# Patient Record
Sex: FEMALE | Race: WHITE | NOT HISPANIC OR LATINO | Employment: FULL TIME | URBAN - METROPOLITAN AREA
[De-identification: names, ages, dates, MRNs, and addresses within clinical notes are randomized per-mention and may not be internally consistent; named-entity substitution may affect disease eponyms.]

---

## 2018-01-28 ENCOUNTER — APPOINTMENT (OUTPATIENT)
Dept: CT IMAGING | Facility: HOSPITAL | Age: 58
End: 2018-01-28

## 2018-01-28 ENCOUNTER — HOSPITAL ENCOUNTER (EMERGENCY)
Facility: HOSPITAL | Age: 58
Discharge: HOME | End: 2018-01-28
Attending: EMERGENCY MEDICINE | Admitting: EMERGENCY MEDICINE

## 2018-01-28 VITALS
DIASTOLIC BLOOD PRESSURE: 73 MMHG | TEMPERATURE: 102.5 F | HEIGHT: 69 IN | WEIGHT: 260 LBS | HEART RATE: 116 BPM | SYSTOLIC BLOOD PRESSURE: 146 MMHG | BODY MASS INDEX: 38.51 KG/M2 | RESPIRATION RATE: 16 BRPM | OXYGEN SATURATION: 95 %

## 2018-01-28 DIAGNOSIS — R11.10 VOMITING AND DIARRHEA: Primary | ICD-10-CM

## 2018-01-28 DIAGNOSIS — R19.7 VOMITING AND DIARRHEA: Primary | ICD-10-CM

## 2018-01-28 LAB
ALBUMIN SERPL-MCNC: 4.1 G/DL (ref 3.5–5.2)
ALBUMIN/GLOB SERPL: 1.1 G/DL
ALP SERPL-CCNC: 83 U/L (ref 39–117)
ALT SERPL W P-5'-P-CCNC: 17 U/L (ref 1–33)
ANION GAP SERPL CALCULATED.3IONS-SCNC: 20.1 MMOL/L
AST SERPL-CCNC: 15 U/L (ref 1–32)
BASOPHILS # BLD AUTO: 0.01 10*3/MM3 (ref 0–0.2)
BASOPHILS NFR BLD AUTO: 0.1 % (ref 0–1.5)
BILIRUB SERPL-MCNC: 0.5 MG/DL (ref 0.1–1.2)
BUN BLD-MCNC: 10 MG/DL (ref 6–20)
BUN/CREAT SERPL: 10.8 (ref 7–25)
CALCIUM SPEC-SCNC: 9.5 MG/DL (ref 8.6–10.5)
CHLORIDE SERPL-SCNC: 87 MMOL/L (ref 98–107)
CO2 SERPL-SCNC: 25.9 MMOL/L (ref 22–29)
CREAT BLD-MCNC: 0.93 MG/DL (ref 0.57–1)
DEPRECATED RDW RBC AUTO: 42.2 FL (ref 37–54)
EOSINOPHIL # BLD AUTO: 0.03 10*3/MM3 (ref 0–0.7)
EOSINOPHIL NFR BLD AUTO: 0.2 % (ref 0.3–6.2)
ERYTHROCYTE [DISTWIDTH] IN BLOOD BY AUTOMATED COUNT: 12.8 % (ref 11.7–13)
GFR SERPL CREATININE-BSD FRML MDRD: 62 ML/MIN/1.73
GLOBULIN UR ELPH-MCNC: 3.7 GM/DL
GLUCOSE BLD-MCNC: 225 MG/DL (ref 65–99)
HCT VFR BLD AUTO: 40.9 % (ref 35.6–45.5)
HGB BLD-MCNC: 13.3 G/DL (ref 11.9–15.5)
IMM GRANULOCYTES # BLD: 0.05 10*3/MM3 (ref 0–0.03)
IMM GRANULOCYTES NFR BLD: 0.4 % (ref 0–0.5)
LIPASE SERPL-CCNC: 20 U/L (ref 13–60)
LYMPHOCYTES # BLD AUTO: 1.02 10*3/MM3 (ref 0.9–4.8)
LYMPHOCYTES NFR BLD AUTO: 7.3 % (ref 19.6–45.3)
MCH RBC QN AUTO: 29.4 PG (ref 26.9–32)
MCHC RBC AUTO-ENTMCNC: 32.5 G/DL (ref 32.4–36.3)
MCV RBC AUTO: 90.3 FL (ref 80.5–98.2)
MONOCYTES # BLD AUTO: 0.64 10*3/MM3 (ref 0.2–1.2)
MONOCYTES NFR BLD AUTO: 4.6 % (ref 5–12)
NEUTROPHILS # BLD AUTO: 12.19 10*3/MM3 (ref 1.9–8.1)
NEUTROPHILS NFR BLD AUTO: 87.4 % (ref 42.7–76)
PLATELET # BLD AUTO: 269 10*3/MM3 (ref 140–500)
PMV BLD AUTO: 10.3 FL (ref 6–12)
POTASSIUM BLD-SCNC: 4.1 MMOL/L (ref 3.5–5.2)
PROT SERPL-MCNC: 7.8 G/DL (ref 6–8.5)
RBC # BLD AUTO: 4.53 10*6/MM3 (ref 3.9–5.2)
SODIUM BLD-SCNC: 133 MMOL/L (ref 136–145)
WBC NRBC COR # BLD: 13.94 10*3/MM3 (ref 4.5–10.7)

## 2018-01-28 PROCEDURE — 25010000002 ONDANSETRON PER 1 MG: Performed by: EMERGENCY MEDICINE

## 2018-01-28 PROCEDURE — 80053 COMPREHEN METABOLIC PANEL: CPT | Performed by: EMERGENCY MEDICINE

## 2018-01-28 PROCEDURE — 99284 EMERGENCY DEPT VISIT MOD MDM: CPT

## 2018-01-28 PROCEDURE — 96361 HYDRATE IV INFUSION ADD-ON: CPT

## 2018-01-28 PROCEDURE — 0 IOPAMIDOL 61 % SOLUTION: Performed by: EMERGENCY MEDICINE

## 2018-01-28 PROCEDURE — 96374 THER/PROPH/DIAG INJ IV PUSH: CPT

## 2018-01-28 PROCEDURE — 85025 COMPLETE CBC W/AUTO DIFF WBC: CPT | Performed by: EMERGENCY MEDICINE

## 2018-01-28 PROCEDURE — 83690 ASSAY OF LIPASE: CPT | Performed by: EMERGENCY MEDICINE

## 2018-01-28 PROCEDURE — 74177 CT ABD & PELVIS W/CONTRAST: CPT

## 2018-01-28 RX ORDER — ONDANSETRON 2 MG/ML
4 INJECTION INTRAMUSCULAR; INTRAVENOUS ONCE
Status: COMPLETED | OUTPATIENT
Start: 2018-01-28 | End: 2018-01-28

## 2018-01-28 RX ORDER — ONDANSETRON 4 MG/1
4 TABLET, ORALLY DISINTEGRATING ORAL 4 TIMES DAILY PRN
Qty: 15 TABLET | Refills: 0 | Status: SHIPPED | OUTPATIENT
Start: 2018-01-28

## 2018-01-28 RX ORDER — METRONIDAZOLE 500 MG/1
500 TABLET ORAL 3 TIMES DAILY
Qty: 21 TABLET | Refills: 0 | Status: SHIPPED | OUTPATIENT
Start: 2018-01-28 | End: 2019-07-29 | Stop reason: ALTCHOICE

## 2018-01-28 RX ORDER — ACETAMINOPHEN 500 MG
1000 TABLET ORAL ONCE
Status: COMPLETED | OUTPATIENT
Start: 2018-01-28 | End: 2018-01-28

## 2018-01-28 RX ADMIN — ONDANSETRON 4 MG: 2 INJECTION INTRAMUSCULAR; INTRAVENOUS at 21:23

## 2018-01-28 RX ADMIN — SODIUM CHLORIDE 500 ML: 9 INJECTION, SOLUTION INTRAVENOUS at 22:59

## 2018-01-28 RX ADMIN — SODIUM CHLORIDE 1000 ML: 9 INJECTION, SOLUTION INTRAVENOUS at 21:22

## 2018-01-28 RX ADMIN — IOPAMIDOL 80 ML: 612 INJECTION, SOLUTION INTRAVENOUS at 22:32

## 2018-01-28 RX ADMIN — ACETAMINOPHEN 1000 MG: 500 TABLET ORAL at 22:39

## 2019-07-29 ENCOUNTER — OFFICE VISIT (OUTPATIENT)
Dept: ENDOCRINOLOGY | Age: 59
End: 2019-07-29

## 2019-07-29 ENCOUNTER — PRIOR AUTHORIZATION (OUTPATIENT)
Dept: ENDOCRINOLOGY | Age: 59
End: 2019-07-29

## 2019-07-29 VITALS
SYSTOLIC BLOOD PRESSURE: 142 MMHG | WEIGHT: 287.8 LBS | DIASTOLIC BLOOD PRESSURE: 80 MMHG | RESPIRATION RATE: 16 BRPM | BODY MASS INDEX: 43.62 KG/M2 | HEIGHT: 68 IN

## 2019-07-29 DIAGNOSIS — E66.01 CLASS 3 SEVERE OBESITY WITHOUT SERIOUS COMORBIDITY WITH BODY MASS INDEX (BMI) OF 40.0 TO 44.9 IN ADULT, UNSPECIFIED OBESITY TYPE (HCC): ICD-10-CM

## 2019-07-29 DIAGNOSIS — E78.5 ATHEROGENIC DYSLIPIDEMIA: ICD-10-CM

## 2019-07-29 DIAGNOSIS — E11.9 TYPE 2 DIABETES MELLITUS WITHOUT COMPLICATION, WITHOUT LONG-TERM CURRENT USE OF INSULIN (HCC): Primary | ICD-10-CM

## 2019-07-29 DIAGNOSIS — E78.00 HIGH CHOLESTEROL: ICD-10-CM

## 2019-07-29 DIAGNOSIS — I10 ESSENTIAL HYPERTENSION: ICD-10-CM

## 2019-07-29 DIAGNOSIS — E55.9 VITAMIN D DEFICIENCY: ICD-10-CM

## 2019-07-29 PROBLEM — E66.813 CLASS 3 SEVERE OBESITY WITHOUT SERIOUS COMORBIDITY WITH BODY MASS INDEX (BMI) OF 40.0 TO 44.9 IN ADULT: Status: ACTIVE | Noted: 2019-07-29

## 2019-07-29 PROCEDURE — 99205 OFFICE O/P NEW HI 60 MIN: CPT | Performed by: INTERNAL MEDICINE

## 2019-07-29 RX ORDER — ROSUVASTATIN CALCIUM 40 MG/1
40 TABLET, COATED ORAL DAILY
Qty: 90 TABLET | Refills: 3 | Status: SHIPPED | OUTPATIENT
Start: 2019-07-29 | End: 2020-07-20

## 2019-07-29 RX ORDER — LISINOPRIL 40 MG/1
40 TABLET ORAL DAILY
COMMUNITY

## 2019-07-29 RX ORDER — GLIMEPIRIDE 4 MG/1
4 TABLET ORAL
COMMUNITY
End: 2019-07-29

## 2019-07-29 RX ORDER — EMPAGLIFLOZIN, METFORMIN HYDROCHLORIDE 12.5; 1 MG/1; MG/1
2 TABLET, EXTENDED RELEASE ORAL
Qty: 60 TABLET | Refills: 11 | Status: SHIPPED | OUTPATIENT
Start: 2019-07-29 | End: 2020-08-04

## 2019-07-29 RX ORDER — INSULIN GLARGINE 300 U/ML
90 INJECTION, SOLUTION SUBCUTANEOUS
Qty: 3 PEN | Refills: 11 | Status: SHIPPED | OUTPATIENT
Start: 2019-07-29 | End: 2019-10-30

## 2019-07-29 RX ORDER — EXENATIDE 2 MG/.85ML
2 INJECTION, SUSPENSION, EXTENDED RELEASE SUBCUTANEOUS WEEKLY
Qty: 4 PEN | Refills: 11 | Status: SHIPPED | OUTPATIENT
Start: 2019-07-29 | End: 2019-12-06

## 2019-07-29 NOTE — PROGRESS NOTES
"Subjective   Ashley Malone is a 59 y.o. female seen as a new patient for DM2. She is not checking BG. She states that she is having tingling in her feet. She denies any other problems or concerns.     History of Present Illness this is a 59-year-old female known patient with type 2 diabetes for more than a decade and her diabetes has been managed by glimepiride 4 mg twice daily and recently has been a started on Levemir 20 units nightly.  She is also unknown patient with hypertension and dyslipidemia as well as morbid obesity and peripheral and cerebrovascular disease and chronic fatigue.  She has a strong family history of type 2 diabetes and heart disease.  She has not been able to lose weight and in fact has been gaining weight for the past several years.    /80   Resp 16   Ht 172.7 cm (68\")   Wt 131 kg (287 lb 12.8 oz)   BMI 43.76 kg/m²      Allergies   Allergen Reactions   • Augmentin [Amoxicillin-Pot Clavulanate] GI Intolerance   • Buffered Aspirin Unknown (See Comments)     Nose bleeds       Current Outpatient Medications:   •  AMLODIPINE BESYLATE PO, Take 5 mg by mouth daily with breakfast., Disp: , Rfl:   •  Armodafinil (NUVIGIL PO), Take 250 mg by mouth every morning., Disp: , Rfl:   •  Celecoxib (CELEBREX PO), Take 200 mg by mouth every morning., Disp: , Rfl:   •  clopidogrel (PLAVIX) 75 MG tablet, Take 75 mg by mouth daily., Disp: , Rfl:   •  GABAPENTIN PO, Take 200 mg by mouth 3 (three) times a day., Disp: , Rfl:   •  glimepiride (AMARYL) 4 MG tablet, Take 4 mg by mouth Every Morning Before Breakfast., Disp: , Rfl:   •  HYDROcodone-acetaminophen (NORCO)  MG per tablet, Take 1 tablet by mouth every 4 (four) hours as needed for moderate pain (4-6)., Disp: 1 tablet, Rfl: 0  •  Hyoscyamine Sulfate (LEVBID PO), Take 1.25 mg by mouth daily as needed., Disp: , Rfl:   •  insulin detemir (LEVEMIR FLEXTOUCH) 100 UNIT/ML injection, Inject 20 Units under the skin into the appropriate area as " directed Every Night., Disp: , Rfl:   •  lisinopril (PRINIVIL,ZESTRIL) 40 MG tablet, Take 40 mg by mouth Daily., Disp: , Rfl:   •  METOPROLOL SUCCINATE ER PO, Take 40 mg by mouth every morning., Disp: , Rfl:   •  MONTELUKAST SODIUM PO, Take 10 mg by mouth every morning., Disp: , Rfl:   •  ondansetron ODT (ZOFRAN-ODT) 4 MG disintegrating tablet, Take 1 tablet by mouth 4 (Four) Times a Day As Needed for Nausea or Vomiting., Disp: 15 tablet, Rfl: 0  •  rOPINIRole (REQUIP) 4 MG tablet, Take 4 mg by mouth at night as needed., Disp: , Rfl:   •  TRAZODONE HCL PO, Take 300 mg by mouth every night., Disp: , Rfl:       The following portions of the patient's history were reviewed and updated as appropriate: allergies, current medications, past family history, past medical history, past social history, past surgical history and problem list.    Review of Systems   Constitutional: Negative.    HENT: Negative.    Eyes: Negative.    Respiratory: Negative.    Cardiovascular: Negative.    Gastrointestinal: Negative.    Endocrine: Negative.    Genitourinary: Negative.    Musculoskeletal: Negative.    Skin: Negative.    Allergic/Immunologic: Negative.    Neurological: Negative.    Hematological: Negative.    Psychiatric/Behavioral: Negative.        Objective   Physical Exam   Constitutional: She is oriented to person, place, and time. She appears well-developed and well-nourished. No distress.   HENT:   Head: Normocephalic and atraumatic.   Right Ear: External ear normal.   Left Ear: External ear normal.   Nose: Nose normal.   Mouth/Throat: Oropharynx is clear and moist. No oropharyngeal exudate.   Eyes: Conjunctivae and EOM are normal. Pupils are equal, round, and reactive to light. Right eye exhibits no discharge. Left eye exhibits no discharge. No scleral icterus.   Neck: Normal range of motion. Neck supple. No JVD present. No tracheal deviation present. No thyromegaly present.   Cardiovascular: Normal rate, regular rhythm, normal  heart sounds and intact distal pulses. Exam reveals no gallop and no friction rub.   No murmur heard.  Pulmonary/Chest: Effort normal and breath sounds normal. No stridor. No respiratory distress. She has no wheezes. She has no rales. She exhibits no tenderness.   Abdominal: Soft. Bowel sounds are normal. She exhibits no distension and no mass. There is no tenderness. There is no rebound and no guarding. No hernia.   Musculoskeletal: Normal range of motion. She exhibits no edema, tenderness or deformity.   Healed scar of knee surgery on the anterior aspect of right knee.   Lymphadenopathy:     She has no cervical adenopathy.   Neurological: She is alert and oriented to person, place, and time. She has normal reflexes. She displays normal reflexes. No cranial nerve deficit or sensory deficit. She exhibits normal muscle tone. Coordination normal.   Skin: Skin is warm and dry. No rash noted. She is not diaphoretic. No erythema. No pallor.   Psychiatric: She has a normal mood and affect. Her behavior is normal. Judgment and thought content normal.   Nursing note and vitals reviewed.        Assessment/Plan   Diagnoses and all orders for this visit:    Type 2 diabetes mellitus without complication, without long-term current use of insulin (CMS/[facility])  -     T4 & TSH ([facility])  -     T3, Free  -     T4, Free  -     Uric Acid  -     Vitamin D 25 Hydroxy  -     Comprehensive Metabolic Panel  -     C-Peptide  -     Follicle Stimulating Hormone  -     Hemoglobin A1c  -     MicroAlbumin, Urine, Random - Urine, Clean Catch  -     ACTH  -     Cortisol  -     Calcium, Ionized  -     PTH, Intact  -     Phosphorus  -     NMR LipoProfile  -     T4 & TSH ([facility]); Future  -     Vitamin D 25 Hydroxy; Future  -     Uric Acid; Future  -     Comprehensive Metabolic Panel; Future  -     C-Peptide; Future  -     Hemoglobin A1c; Future  -     MicroAlbumin, Urine, Random - Urine, Clean Catch; Future  -     NMR LipoProfile; Future    Essential  hypertension  -     T4 & TSH ([facility])  -     T3, Free  -     T4, Free  -     Uric Acid  -     Vitamin D 25 Hydroxy  -     Comprehensive Metabolic Panel  -     C-Peptide  -     Follicle Stimulating Hormone  -     Hemoglobin A1c  -     MicroAlbumin, Urine, Random - Urine, Clean Catch  -     ACTH  -     Cortisol  -     Calcium, Ionized  -     PTH, Intact  -     Phosphorus  -     NMR LipoProfile  -     T4 & TSH ([facility]); Future  -     Vitamin D 25 Hydroxy; Future  -     Uric Acid; Future  -     Comprehensive Metabolic Panel; Future  -     C-Peptide; Future  -     Hemoglobin A1c; Future  -     MicroAlbumin, Urine, Random - Urine, Clean Catch; Future  -     NMR LipoProfile; Future    High cholesterol  -     T4 & TSH ([facility])  -     T3, Free  -     T4, Free  -     Uric Acid  -     Vitamin D 25 Hydroxy  -     Comprehensive Metabolic Panel  -     C-Peptide  -     Follicle Stimulating Hormone  -     Hemoglobin A1c  -     MicroAlbumin, Urine, Random - Urine, Clean Catch  -     ACTH  -     Cortisol  -     Calcium, Ionized  -     PTH, Intact  -     Phosphorus  -     NMR LipoProfile  -     T4 & TSH ([facility]); Future  -     Vitamin D 25 Hydroxy; Future  -     Uric Acid; Future  -     Comprehensive Metabolic Panel; Future  -     C-Peptide; Future  -     Hemoglobin A1c; Future  -     MicroAlbumin, Urine, Random - Urine, Clean Catch; Future  -     NMR LipoProfile; Future    Class 3 severe obesity without serious comorbidity with body mass index (BMI) of 40.0 to 44.9 in adult, unspecified obesity type (CMS/HCC)  -     T4 & TSH ([facility])  -     T3, Free  -     T4, Free  -     Uric Acid  -     Vitamin D 25 Hydroxy  -     Comprehensive Metabolic Panel  -     C-Peptide  -     Follicle Stimulating Hormone  -     Hemoglobin A1c  -     MicroAlbumin, Urine, Random - Urine, Clean Catch  -     ACTH  -     Cortisol  -     Calcium, Ionized  -     PTH, Intact  -     Phosphorus  -     NMR LipoProfile  -     T4 & TSH ([facility]); Future  -      Vitamin D 25 Hydroxy; Future  -     Uric Acid; Future  -     Comprehensive Metabolic Panel; Future  -     C-Peptide; Future  -     Hemoglobin A1c; Future  -     MicroAlbumin, Urine, Random - Urine, Clean Catch; Future  -     NMR LipoProfile; Future    Vitamin D deficiency  -     T4 & TSH ([facility])  -     T3, Free  -     T4, Free  -     Uric Acid  -     Vitamin D 25 Hydroxy  -     Comprehensive Metabolic Panel  -     C-Peptide  -     Follicle Stimulating Hormone  -     Hemoglobin A1c  -     MicroAlbumin, Urine, Random - Urine, Clean Catch  -     ACTH  -     Cortisol  -     Calcium, Ionized  -     PTH, Intact  -     Phosphorus  -     NMR LipoProfile  -     T4 & TSH ([facility]); Future  -     Vitamin D 25 Hydroxy; Future  -     Uric Acid; Future  -     Comprehensive Metabolic Panel; Future  -     C-Peptide; Future  -     Hemoglobin A1c; Future  -     MicroAlbumin, Urine, Random - Urine, Clean Catch; Future  -     NMR LipoProfile; Future    Atherogenic dyslipidemia  -     T4 & TSH ([facility])  -     T3, Free  -     T4, Free  -     Uric Acid  -     Vitamin D 25 Hydroxy  -     Comprehensive Metabolic Panel  -     C-Peptide  -     Follicle Stimulating Hormone  -     Hemoglobin A1c  -     MicroAlbumin, Urine, Random - Urine, Clean Catch  -     ACTH  -     Cortisol  -     Calcium, Ionized  -     PTH, Intact  -     Phosphorus  -     NMR LipoProfile  -     T4 & TSH ([facility]); Future  -     Vitamin D 25 Hydroxy; Future  -     Uric Acid; Future  -     Comprehensive Metabolic Panel; Future  -     C-Peptide; Future  -     Hemoglobin A1c; Future  -     MicroAlbumin, Urine, Random - Urine, Clean Catch; Future  -     NMR LipoProfile; Future    Other orders  -     BYDUREON BCISE 2 MG/0.85ML auto-injector injection; Inject 0.85 mL under the skin into the appropriate area as directed 1 (One) Time Per Week.  -     SYNJARDY XR 12.5-1000 MG tablet sustained-release 24 hour; Take 2 tablets by mouth Daily With Breakfast.  -     rosuvastatin  (CRESTOR) 40 MG tablet; Take 1 tablet by mouth Daily.  -     TOUJEO MAX SOLOSTAR 300 UNIT/ML solution pen-injector; Inject 90 Units under the skin into the appropriate area as directed Daily With Breakfast.        In summary I saw and examined this 59-year-old female for above-mentioned problems.  I reviewed her laboratory evaluations of 10/17/2018 as well as 2/6/2019 and 5/29/2019 and at this point we will go ahead and order extensive laboratory evaluation and once the results come back we will go ahead and call for any possible modification or new medications.  In the meantime I am stopping both Amaryl and Levemir and is starting her on Bydureon on 2 mg once weekly and Synjardy 5/1000 mg 2 tablets every morning and if tolerated we will increase the dose to Synjardy XR 12.5/1000 mg 2 tablets every morning.  I asked her to be sure to drink plenty of fluids and cleans her genital area thoroughly following use of toilet or sexual intercourse.  And finally I start her on Toujeo 30 units every morning and ask her to increase her dose by 2 units every 3 days if fasting blood glucose is greater than 110.  We also ordered a freestyle natalie for her continuous blood glucose monitoring.  This office visit lasted 70 minutes of which 40 minutes was a spent on face-to-face counseling and education and planning the patient's future care moving forward.  She will see Ms. Krista Montana in 4 months or sooner if needed with laboratory evaluation prior to each office visit.

## 2019-07-30 LAB
25(OH)D3+25(OH)D2 SERPL-MCNC: 24 NG/ML (ref 30–100)
ACTH PLAS-MCNC: 26.9 PG/ML (ref 7.2–63.3)
ALBUMIN SERPL-MCNC: 4.3 G/DL (ref 3.5–5.2)
ALBUMIN/GLOB SERPL: 1.3 G/DL
ALP SERPL-CCNC: 128 U/L (ref 39–117)
ALT SERPL-CCNC: 16 U/L (ref 1–33)
AST SERPL-CCNC: 16 U/L (ref 1–32)
BILIRUB SERPL-MCNC: 0.2 MG/DL (ref 0.2–1.2)
BUN SERPL-MCNC: 10 MG/DL (ref 6–20)
BUN/CREAT SERPL: 12.2 (ref 7–25)
C PEPTIDE SERPL-MCNC: 4.8 NG/ML (ref 1.1–4.4)
CA-I SERPL ISE-MCNC: 5.3 MG/DL (ref 4.5–5.6)
CALCIUM SERPL-MCNC: 10 MG/DL (ref 8.6–10.5)
CHLORIDE SERPL-SCNC: 92 MMOL/L (ref 98–107)
CHOLEST SERPL-MCNC: 212 MG/DL (ref 100–199)
CO2 SERPL-SCNC: 32.6 MMOL/L (ref 22–29)
CORTIS SERPL-MCNC: 17.9 UG/DL
CREAT SERPL-MCNC: 0.82 MG/DL (ref 0.57–1)
FSH SERPL-ACNC: 61.1 MIU/ML
GLOBULIN SER CALC-MCNC: 3.2 GM/DL
GLUCOSE SERPL-MCNC: 315 MG/DL (ref 65–99)
HBA1C MFR BLD: 11.6 % (ref 4.8–5.6)
HDL SERPL-SCNC: 40.3 UMOL/L
HDLC SERPL-MCNC: 55 MG/DL
LDL SERPL QN: 20 NM
LDL SERPL-SCNC: 1765 NMOL/L
LDL SMALL SERPL-SCNC: 1281 NMOL/L
LDLC SERPL CALC-MCNC: 100 MG/DL (ref 0–99)
MICROALBUMIN UR-MCNC: 65.4 UG/ML
PHOSPHATE SERPL-MCNC: 3.7 MG/DL (ref 2.5–4.5)
POTASSIUM SERPL-SCNC: 4.8 MMOL/L (ref 3.5–5.2)
PROT SERPL-MCNC: 7.5 G/DL (ref 6–8.5)
PTH-INTACT SERPL-MCNC: 37 PG/ML (ref 15–65)
SODIUM SERPL-SCNC: 136 MMOL/L (ref 136–145)
T3FREE SERPL-MCNC: 2.6 PG/ML (ref 2–4.4)
T4 FREE SERPL-MCNC: 1.14 NG/DL (ref 0.93–1.7)
T4 SERPL-MCNC: 7.55 MCG/DL (ref 4.5–11.7)
TRIGL SERPL-MCNC: 287 MG/DL (ref 0–149)
TSH SERPL DL<=0.005 MIU/L-ACNC: 0.63 MIU/ML (ref 0.27–4.2)
URATE SERPL-MCNC: 3.2 MG/DL (ref 2.4–5.7)

## 2019-07-31 RX ORDER — ERGOCALCIFEROL 1.25 MG/1
50000 CAPSULE ORAL WEEKLY
Qty: 13 CAPSULE | Refills: 3 | Status: SHIPPED | OUTPATIENT
Start: 2019-07-31 | End: 2020-07-30

## 2019-08-02 ENCOUNTER — HOSPITAL ENCOUNTER (EMERGENCY)
Facility: HOSPITAL | Age: 59
Discharge: HOME | End: 2019-08-02
Attending: EMERGENCY MEDICINE | Admitting: EMERGENCY MEDICINE

## 2019-08-02 ENCOUNTER — APPOINTMENT (OUTPATIENT)
Dept: GENERAL RADIOLOGY | Facility: HOSPITAL | Age: 59
End: 2019-08-02

## 2019-08-02 VITALS
OXYGEN SATURATION: 92 % | SYSTOLIC BLOOD PRESSURE: 132 MMHG | HEIGHT: 68 IN | HEART RATE: 91 BPM | BODY MASS INDEX: 42.44 KG/M2 | DIASTOLIC BLOOD PRESSURE: 86 MMHG | RESPIRATION RATE: 16 BRPM | WEIGHT: 280 LBS | TEMPERATURE: 97.9 F
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
## 2019-08-02 DIAGNOSIS — S70.01XA CONTUSION OF RIGHT HIP, INITIAL ENCOUNTER: Primary | ICD-10-CM

## 2019-08-02 PROCEDURE — 99283 EMERGENCY DEPT VISIT LOW MDM: CPT

## 2019-08-02 PROCEDURE — 73552 X-RAY EXAM OF FEMUR 2/>: CPT

## 2019-08-02 PROCEDURE — 73560 X-RAY EXAM OF KNEE 1 OR 2: CPT

## 2019-08-02 RX ORDER — HYDROCODONE BITARTRATE AND ACETAMINOPHEN 5; 325 MG/1; MG/1
1 TABLET ORAL ONCE
Status: COMPLETED | OUTPATIENT
Start: 2019-08-02 | End: 2019-08-02

## 2019-08-02 RX ADMIN — HYDROCODONE BITARTRATE AND ACETAMINOPHEN 1 TABLET: 5; 325 TABLET ORAL at 22:15

## 2019-08-02 NOTE — ED NOTES
Brought in by ems from [facility].    Pt fell while walking out to car, right knee buckled and landed on her hip. Pt is able to wt bear but with significant pain.     Denies hitting head. Able to sit up independently prior to EMS arrival     No rotation or shortening appreciated       Jillian Browne, RN  08/02/19 1954

## 2019-08-03 NOTE — ED PROVIDER NOTES
" EMERGENCY DEPARTMENT ENCOUNTER    CHIEF COMPLAINT  Chief Complaint: Hip Pain  History given by: Patient  History limited by:   Room Number: 06/06  PMD: Saundra Monroe MD      HPI:  Pt is a 59 y.o. female who presents complaining of R hip pain s/p mechanical fall that occurred today while walking to car. She reports that her knee \"gave out\" causing her to fall with direct trauma to hip. She has been able to briefly ambulate with pain. She denies striking her head or any LOC. She has had previous R femur issues.     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 08/15/2016   • Hypertension 08/16/2016   • High cholesterol 08/16/2016   • Hyperkalemia 08/16/2016   • Anemia, posthemorrhagic, acute 08/17/2016   • Hypotension 08/17/2016   • Post-operative pain 08/24/2016   • HECTOR (obstructive sleep apnea) 08/25/2016   • Vitamin D deficiency 07/29/2019   • Class 3 severe obesity without serious comorbidity with body mass index (BMI) of 40.0 to 44.9 in adult (CMS/[facility]) 07/29/2019   • Type 2 diabetes mellitus without complication, without long-term current use of insulin (CMS/[facility]) 07/29/2019   • Atherogenic dyslipidemia 07/29/2019     Resolved Ambulatory Problems     Diagnosis Date Noted   • No Resolved Ambulatory Problems     Past Medical History:   Diagnosis Date   • Awareness under anesthesia    • Back pain    • Depression    • Dizziness    • Elevated blood sugar level    • Frequent UTI    • GERD (gastroesophageal reflux disease)    • High cholesterol    • History of Clostridium difficile 2012   • History of TIAs    • Hypertension    • IBS (irritable bowel syndrome)    • Left ankle pain    • Leg pain, right    • On home oxygen therapy    • Pancreas disorder    • PVC's (premature ventricular contractions)    • Restless leg syndrome    • Type 2 diabetes mellitus (CMS/[facility])        PAST SURGICAL HISTORY  Past Surgical " History:   Procedure Laterality Date   • ANKLE SURGERY Right    • BACK SURGERY     • HYSTERECTOMY     • KNEE ARTHROPLASTY Right    • NASAL SINUS SURGERY      X3   • NV REVISE KNEE JOINT REPLACE,1 PART Right 8/15/2016    Procedure: RT TOTAL KNEE ARTHROPLASTY REVISION;  Surgeon: Alcides Ramirez MD;  Location: [facility] MAIN OR;  Service: Orthopedics       FAMILY HISTORY  No family history on file.    SOCIAL HISTORY  Social History     Socioeconomic History   • Marital status:      Spouse name: Not on file   • Number of children: Not on file   • Years of education: Not on file   • Highest education level: Not on file   Tobacco Use   • Smoking status: Never Smoker   • Smokeless tobacco: Never Used   Substance and Sexual Activity   • Alcohol use: Yes     Comment: RARELY   • Drug use: No   • Sexual activity: Defer       ALLERGIES  Augmentin [amoxicillin-pot clavulanate] and Buffered aspirin    REVIEW OF SYSTEMS  Review of Systems   Constitutional: Negative for fever.   HENT: Negative for sore throat.    Eyes: Negative.    Respiratory: Negative for cough and shortness of breath.    Cardiovascular: Negative for chest pain.   Gastrointestinal: Negative for abdominal pain, diarrhea and vomiting.   Genitourinary: Negative for dysuria.   Musculoskeletal: Positive for arthralgias (R hip) and gait problem (due to pain). Negative for back pain and neck pain.   Skin: Negative for rash.   Allergic/Immunologic: Negative.    Neurological: Negative for weakness, numbness and headaches.   Hematological: Negative.    Psychiatric/Behavioral: Negative.    All other systems reviewed and are negative.      PHYSICAL EXAM  ED Triage Vitals   Temp Heart Rate Resp BP SpO2   08/02/19 1950 08/02/19 1949 08/02/19 1950 08/02/19 1949 08/02/19 1949   97.9 °F (36.6 °C) 93 18 147/81 92 %      Temp src Heart Rate Source Patient Position BP Location FiO2 (%)   08/02/19 1949 -- -- -- --   Tympanic           Physical Exam   General: Awake, alert, no acute  distress  HEENT: EOMI  Pulm: Symmetric chest rise, nonlabored breathing  CV: Regular rate and rhythm  MSK: No deformity, mild R hip tenderness. No shortening or rotation of R leg. No knee tenderness  Skin: Warm, dry  Neuro: Alert and oriented x 3, moving all extremities, no focal deficits  Psych: Calm, cooperative    RADIOLOGY  XR Femur 2 View Right   Final Result   No acute fracture or subluxation identified.       This report was finalized on 8/2/2019 9:52 PM by Dr. Kerri Sims M.D.          XR Knee 1 or 2 View Right    (Results Pending)        I ordered the above noted radiological studies. Interpreted by radiologist. Reviewed by me in PACS.       PROCEDURES  Procedures      PROGRESS AND CONSULTS     8:35 PM  Ordered XR R femur and R knee.  10:05 PM  Rechecked with pt. Discussed with pt about her imaging showing nothing acute. Discussed with pt about plan to discharge. Pt understands and agrees with plan. All concerns were addressed.      MEDICAL DECISION MAKING  Results were reviewed/discussed with the patient and they were also made aware of online access. Pt also made aware that some labs, such as cultures, will not be resulted during ER visit and follow up with PMD is necessary.     MDM  Number of Diagnoses or Management Options  Contusion of right hip, initial encounter:   Fall, initial encounter:   Diagnosis management comments: Patient with fall earlier today and previous surgeries to her femur from trauma.  Pain in the right hip.  X-rays negative for fractures.  Patient given single Norco for pain here, advised PCP and orthopedic follow-up as needed, and return to the emergency department for worsening symptoms as needed.  Ice and heat as needed for pain and stiffness. No other acute concerning findings on H&P to warrant further labs/imaging studies at this time.          DIAGNOSIS  Final diagnoses:   Contusion of right hip, initial encounter   Fall, initial encounter        DISPOSITION  DISCHARGE    Patient discharged in stable condition.    Reviewed implications of results, diagnosis, meds, responsibility to follow up, warning signs and symptoms of possible worsening, potential complications and reasons to return to ER.    Patient/Family voiced understanding of above instructions.    Discussed plan for discharge, as there is no emergent indication for admission. Patient referred to primary care provider for BP management due to today's BP. Pt/family is agreeable and understands need for follow up and repeat testing.  Pt is aware that discharge does not mean that nothing is wrong but it indicates no emergency is present that requires admission and they must continue care with follow-up as given below or physician of their choice.     FOLLOW-UP  Saundra Monroe MD  [address] [id number] [phone]    Call   For Primary Physician follow-up         Medication List      No changes were made to your prescriptions during this visit.           Latest Documented Vital Signs:  As of 10:07 PM  BP- 147/81 HR- 93 Temp- 97.9 °F (36.6 °C) (Tympanic) O2 sat- 92%    --  Documentation assistance provided by eva June for Dr. Willams.  Information recorded by the scrnicoe was done at my direction and has been verified and validated by me.       Regan June  08/02/19 [id number]       Rogerio Willams MD  08/03/19 0023

## 2019-08-03 NOTE — DISCHARGE INSTRUCTIONS
Ice and heat for pain and swelling as needed, Tylenol or current pain medications for pain as needed, follow-up with your orthopedic and primary care provider as needed, return to the emergency department for worsening symptoms as needed.  No evidence of acute fracture on x-rays today.

## 2019-10-16 ENCOUNTER — TELEPHONE (OUTPATIENT)
Dept: ENDOCRINOLOGY | Age: 59
End: 2019-10-16

## 2019-10-16 NOTE — TELEPHONE ENCOUNTER
[phone] [facility] Toujeo Max prior authorization request and they have not heard back.   She said she will be out of the samples she was given within a week.    Patient ph. - [phone]

## 2019-10-29 ENCOUNTER — TELEPHONE (OUTPATIENT)
Dept: ENDOCRINOLOGY | Age: 59
End: 2019-10-29

## 2019-10-29 NOTE — TELEPHONE ENCOUNTER
PT CALLED STATED HE TOUJEO INSULIN NEED A PA. PT REQUESTED TO BE CALLED BACK SAID SHE HAS BEEN TRYING TO GET THIS DONE FOR SEVERAL DAYS AND HAVE NOT GOTTEN IT YET. [phone]

## 2019-11-07 ENCOUNTER — TELEPHONE (OUTPATIENT)
Dept: ENDOCRINOLOGY | Age: 59
End: 2019-11-07

## 2019-11-07 NOTE — TELEPHONE ENCOUNTER
A DIFFERENT INSULIN WAS SENT IN FOR THE TOUJEO AND THAT ALSO NEEDS A  PA    PATIENT NEEDS A PA ON EITHER THE TOUJEO OR THE NEW MEDICATION THAT WAS SENT IN       PATIENT ALSO NEEDS SAMPLES SINCE SHE IS OUT OF HER INSULIN    PLEASE CALL PATIENT [phone]

## 2019-11-08 NOTE — TELEPHONE ENCOUNTER
When I started this patient on Toujeo I gave her prescriptions I was told that her health insurance company prefers Tresiba and therefore we started her on Tresiba not knowing that we still need prior authorization?  Please tell her to come before the end of the day to  a couple of pens of Tresiba.

## 2019-11-09 DIAGNOSIS — E55.9 VITAMIN D DEFICIENCY: ICD-10-CM

## 2019-11-09 DIAGNOSIS — E66.01 CLASS 3 SEVERE OBESITY WITHOUT SERIOUS COMORBIDITY WITH BODY MASS INDEX (BMI) OF 40.0 TO 44.9 IN ADULT, UNSPECIFIED OBESITY TYPE (HCC): ICD-10-CM

## 2019-11-09 DIAGNOSIS — E11.9 TYPE 2 DIABETES MELLITUS WITHOUT COMPLICATION, WITHOUT LONG-TERM CURRENT USE OF INSULIN (HCC): ICD-10-CM

## 2019-11-09 DIAGNOSIS — E78.5 ATHEROGENIC DYSLIPIDEMIA: ICD-10-CM

## 2019-11-09 DIAGNOSIS — E78.00 HIGH CHOLESTEROL: Primary | ICD-10-CM

## 2019-11-15 ENCOUNTER — RESULTS ENCOUNTER (OUTPATIENT)
Dept: ENDOCRINOLOGY | Age: 59
End: 2019-11-15

## 2019-11-15 DIAGNOSIS — I10 ESSENTIAL HYPERTENSION: ICD-10-CM

## 2019-11-15 DIAGNOSIS — E66.01 CLASS 3 SEVERE OBESITY WITHOUT SERIOUS COMORBIDITY WITH BODY MASS INDEX (BMI) OF 40.0 TO 44.9 IN ADULT, UNSPECIFIED OBESITY TYPE (HCC): ICD-10-CM

## 2019-11-15 DIAGNOSIS — E55.9 VITAMIN D DEFICIENCY: ICD-10-CM

## 2019-11-15 DIAGNOSIS — E11.9 TYPE 2 DIABETES MELLITUS WITHOUT COMPLICATION, WITHOUT LONG-TERM CURRENT USE OF INSULIN (HCC): ICD-10-CM

## 2019-11-15 DIAGNOSIS — E78.00 HIGH CHOLESTEROL: ICD-10-CM

## 2019-11-15 DIAGNOSIS — E78.5 ATHEROGENIC DYSLIPIDEMIA: ICD-10-CM

## 2019-11-21 ENCOUNTER — LAB (OUTPATIENT)
Dept: ENDOCRINOLOGY | Age: 59
End: 2019-11-21

## 2019-11-21 DIAGNOSIS — E78.5 ATHEROGENIC DYSLIPIDEMIA: ICD-10-CM

## 2019-11-21 DIAGNOSIS — E66.01 CLASS 3 SEVERE OBESITY WITHOUT SERIOUS COMORBIDITY WITH BODY MASS INDEX (BMI) OF 40.0 TO 44.9 IN ADULT, UNSPECIFIED OBESITY TYPE (HCC): ICD-10-CM

## 2019-11-21 DIAGNOSIS — E78.00 HIGH CHOLESTEROL: ICD-10-CM

## 2019-11-21 DIAGNOSIS — E11.9 TYPE 2 DIABETES MELLITUS WITHOUT COMPLICATION, WITHOUT LONG-TERM CURRENT USE OF INSULIN (HCC): ICD-10-CM

## 2019-11-21 DIAGNOSIS — E55.9 VITAMIN D DEFICIENCY: ICD-10-CM

## 2019-11-22 LAB
25(OH)D3+25(OH)D2 SERPL-MCNC: 28.5 NG/ML (ref 30–100)
ALBUMIN SERPL-MCNC: 4.6 G/DL (ref 3.5–5.2)
ALBUMIN/GLOB SERPL: 1.6 G/DL
ALP SERPL-CCNC: 106 U/L (ref 39–117)
ALT SERPL-CCNC: 9 U/L (ref 1–33)
AST SERPL-CCNC: 12 U/L (ref 1–32)
BILIRUB SERPL-MCNC: 0.2 MG/DL (ref 0.2–1.2)
BUN SERPL-MCNC: 14 MG/DL (ref 6–20)
BUN/CREAT SERPL: 16.7 (ref 7–25)
C PEPTIDE SERPL-MCNC: 3.7 NG/ML (ref 1.1–4.4)
CALCIUM SERPL-MCNC: 9.5 MG/DL (ref 8.6–10.5)
CHLORIDE SERPL-SCNC: 98 MMOL/L (ref 98–107)
CHOLEST SERPL-MCNC: 161 MG/DL (ref 0–200)
CO2 SERPL-SCNC: 23 MMOL/L (ref 22–29)
CREAT SERPL-MCNC: 0.84 MG/DL (ref 0.57–1)
GLOBULIN SER CALC-MCNC: 2.9 GM/DL
GLUCOSE SERPL-MCNC: 196 MG/DL (ref 65–99)
HBA1C MFR BLD: 10.4 % (ref 4.8–5.6)
HDLC SERPL-MCNC: 51 MG/DL (ref 40–60)
INTERPRETATION: NORMAL
LDLC SERPL CALC-MCNC: 58 MG/DL (ref 0–100)
Lab: NORMAL
POTASSIUM SERPL-SCNC: 4.8 MMOL/L (ref 3.5–5.2)
PROT SERPL-MCNC: 7.5 G/DL (ref 6–8.5)
SODIUM SERPL-SCNC: 139 MMOL/L (ref 136–145)
TRIGL SERPL-MCNC: 258 MG/DL (ref 0–150)
URATE SERPL-MCNC: 5.1 MG/DL (ref 2.4–5.7)
VLDLC SERPL CALC-MCNC: 51.6 MG/DL

## 2019-12-06 ENCOUNTER — OFFICE VISIT (OUTPATIENT)
Dept: ENDOCRINOLOGY | Age: 59
End: 2019-12-06

## 2019-12-06 VITALS
HEIGHT: 68 IN | BODY MASS INDEX: 41.22 KG/M2 | SYSTOLIC BLOOD PRESSURE: 130 MMHG | WEIGHT: 272 LBS | HEART RATE: 79 BPM | DIASTOLIC BLOOD PRESSURE: 68 MMHG | OXYGEN SATURATION: 94 %

## 2019-12-06 DIAGNOSIS — I10 ESSENTIAL HYPERTENSION: ICD-10-CM

## 2019-12-06 DIAGNOSIS — E55.9 VITAMIN D DEFICIENCY: ICD-10-CM

## 2019-12-06 DIAGNOSIS — E78.00 HIGH CHOLESTEROL: ICD-10-CM

## 2019-12-06 DIAGNOSIS — E78.5 ATHEROGENIC DYSLIPIDEMIA: ICD-10-CM

## 2019-12-06 DIAGNOSIS — E11.65 UNCONTROLLED TYPE 2 DIABETES MELLITUS WITH HYPERGLYCEMIA (HCC): Primary | ICD-10-CM

## 2019-12-06 PROCEDURE — 99214 OFFICE O/P EST MOD 30 MIN: CPT | Performed by: NURSE PRACTITIONER

## 2019-12-06 RX ORDER — BLOOD SUGAR DIAGNOSTIC
STRIP MISCELLANEOUS
Qty: 100 EACH | Refills: 5 | Status: SHIPPED | OUTPATIENT
Start: 2019-12-06 | End: 2022-12-14

## 2019-12-06 NOTE — PROGRESS NOTES
"Subjective   Ashley Malone is a 59 y.o. female is here today for follow-up.  Chief Complaint   Patient presents with   • Diabetes     Recents labs   test blood sugar  1  times daily    did not bring glucose meter   • Hyperlipidemia   • Hypertension   • Vitamin D Deficiency     /68   Pulse 79   Ht 172.7 cm (68\")   Wt 123 kg (272 lb)   SpO2 94%   BMI 41.36 kg/m²   Current Outpatient Medications on File Prior to Visit   Medication Sig   • AMLODIPINE BESYLATE PO Take 5 mg by mouth daily with breakfast.   • Armodafinil (NUVIGIL PO) Take 250 mg by mouth every morning.   • Celecoxib (CELEBREX PO) Take 200 mg by mouth every morning.   • clopidogrel (PLAVIX) 75 MG tablet Take 75 mg by mouth daily.   • ergocalciferol (DRISDOL) 05367 units capsule Take 1 capsule by mouth 1 (One) Time Per Week.   • GABAPENTIN PO Take 200 mg by mouth 3 (three) times a day.   • HYDROcodone-acetaminophen (NORCO)  MG per tablet Take 1 tablet by mouth every 4 (four) hours as needed for moderate pain (4-6).   • Hyoscyamine Sulfate (LEVBID PO) Take 1.25 mg by mouth daily as needed.   • lisinopril (PRINIVIL,ZESTRIL) 40 MG tablet Take 40 mg by mouth Daily.   • METOPROLOL SUCCINATE ER PO Take 40 mg by mouth every morning.   • MONTELUKAST SODIUM PO Take 10 mg by mouth every morning.   • ondansetron ODT (ZOFRAN-ODT) 4 MG disintegrating tablet Take 1 tablet by mouth 4 (Four) Times a Day As Needed for Nausea or Vomiting.   • rOPINIRole (REQUIP) 4 MG tablet Take 4 mg by mouth at night as needed.   • rosuvastatin (CRESTOR) 40 MG tablet Take 1 tablet by mouth Daily.   • SYNJARDY XR 12.5-1000 MG tablet sustained-release 24 hour Take 2 tablets by mouth Daily With Breakfast.   • TRAZODONE HCL PO Take 300 mg by mouth every night.   • [DISCONTINUED] Insulin Degludec (TRESIBA FLEXTOUCH) 200 UNIT/ML solution pen-injector pen injection Inject 100 Units under the skin into the appropriate area as directed Daily With Breakfast. (Patient taking " differently: Inject 50 Units under the skin into the appropriate area as directed Daily With Breakfast.)   • [DISCONTINUED] BYDUREON BCISE 2 MG/0.85ML auto-injector injection Inject 0.85 mL under the skin into the appropriate area as directed 1 (One) Time Per Week.     No current facility-administered medications on file prior to visit.      History reviewed. No pertinent family history.  Social History     Tobacco Use   • Smoking status: Never Smoker   • Smokeless tobacco: Never Used   Substance Use Topics   • Alcohol use: Yes     Comment: RARELY   • Drug use: No     History of Present Illness   Encounter Diagnoses   Name Primary?   • Essential hypertension    • High cholesterol    • Vitamin D deficiency    • Atherogenic dyslipidemia    • Uncontrolled type 2 diabetes mellitus with hyperglycemia (CMS/[facility]) Yes   This is a 59-year-old female patient here today seen as a walk-in visit.  Her appointment was yesterday however she got her dates mixed up.  She is here today for routine follow-up for the above-mentioned problems.  She had labs done prior to today's visit which reflect uncontrolled type 2 diabetes.  She states she has lost 15 pounds over the past 3 months.  She denies any hypoglycemic events.  She did not bring her blood glucose meter and currently is checking her blood sugars once daily.  She states she needs a new blood glucose meter.  She was given a One Touch verio at today's visit.  She was educated today regarding mechanism of action of current diabetes medications.  She will be started on Ozempic and was shown how to give the injection.  She has been having problems getting her insulin due to no prior authorization being done.  She was given samples today of Toujeo and a new prescription has been sent to her pharmacy.  She was educated on how to titrate to target her morning blood sugars to less than 110 mg/dL.    The following portions of the patient's history were reviewed and updated as  appropriate: allergies, current medications, past family history, past medical history, past social history, past surgical history and problem list.    Review of Systems   Constitutional: Positive for fatigue. Negative for fever. Appetite change: decrease.   Eyes: Negative for visual disturbance.   Respiratory: Negative for shortness of breath.    Cardiovascular: Negative for palpitations and leg swelling.   Gastrointestinal: Negative for abdominal pain and vomiting.   Endocrine: Positive for polydipsia. Negative for polyuria.   Musculoskeletal: Negative for joint swelling and neck pain.   Skin: Negative for rash.   Neurological: Negative for weakness and numbness.   Psychiatric/Behavioral: Negative for behavioral problems.       Objective   Physical Exam   Constitutional: She is oriented to person, place, and time. She appears well-developed and well-nourished. No distress.   HENT:   Head: Normocephalic and atraumatic.   Right Ear: External ear normal.   Left Ear: External ear normal.   Nose: Nose normal.   Mouth/Throat: Oropharynx is clear and moist. No oropharyngeal exudate.   Eyes: EOM are normal. Pupils are equal, round, and reactive to light. Right eye exhibits no discharge. Left eye exhibits no discharge.   Neck: Trachea normal, normal range of motion and full passive range of motion without pain. Neck supple. No tracheal tenderness present. Carotid bruit is not present. No tracheal deviation, no edema and no erythema present. No thyroid mass and no thyromegaly present.   Cardiovascular: Normal rate, regular rhythm, normal heart sounds and intact distal pulses. Exam reveals no gallop and no friction rub.   No murmur heard.  Pulmonary/Chest: Effort normal and breath sounds normal. No stridor. No respiratory distress. She has no wheezes. She has no rales.   Abdominal: Soft. Bowel sounds are normal. She exhibits no distension.   Musculoskeletal: Normal range of motion. She exhibits edema. She exhibits no  deformity.   Trace edema in bilateral lower extremities  Currently using a walker due to recent fall with fracture   Lymphadenopathy:     She has no cervical adenopathy.   Neurological: She is alert and oriented to person, place, and time.   Skin: Skin is warm and dry. No rash noted. She is not diaphoretic. No erythema. No pallor.   Psychiatric: She has a normal mood and affect. Her behavior is normal. Judgment and thought content normal.   Nursing note and vitals reviewed.    Results for orders placed or performed in visit on 11/21/19   Vitamin D 25 Hydroxy   Result Value Ref Range    25 Hydroxy, Vitamin D 28.5 (L) 30.0 - 100.0 ng/ml   Uric Acid   Result Value Ref Range    Uric Acid 5.1 2.4 - 5.7 mg/dL   Lipid Panel   Result Value Ref Range    Total Cholesterol 161 0 - 200 mg/dL    Triglycerides 258 (H) 0 - 150 mg/dL    HDL Cholesterol 51 40 - 60 mg/dL    VLDL Cholesterol 51.6 mg/dL    LDL Cholesterol  58 0 - 100 mg/dL   Hemoglobin A1c   Result Value Ref Range    Hemoglobin A1C 10.40 (H) 4.80 - 5.60 %   C-Peptide   Result Value Ref Range    C-Peptide 3.7 1.1 - 4.4 ng/mL   Comprehensive Metabolic Panel   Result Value Ref Range    Glucose 196 (H) 65 - 99 mg/dL    BUN 14 6 - 20 mg/dL    Creatinine 0.84 0.57 - 1.00 mg/dL    eGFR Non African Am 70 >60 mL/min/1.73    eGFR African Am 84 >60 mL/min/1.73    BUN/Creatinine Ratio 16.7 7.0 - 25.0    Sodium 139 136 - 145 mmol/L    Potassium 4.8 3.5 - 5.2 mmol/L    Chloride 98 98 - 107 mmol/L    Total CO2 23.0 22.0 - 29.0 mmol/L    Calcium 9.5 8.6 - 10.5 mg/dL    Total Protein 7.5 6.0 - 8.5 g/dL    Albumin 4.60 3.50 - 5.20 g/dL    Globulin 2.9 gm/dL    A/G Ratio 1.6 g/dL    Total Bilirubin 0.2 0.2 - 1.2 mg/dL    Alkaline Phosphatase 106 39 - 117 U/L    AST (SGOT) 12 1 - 32 U/L    ALT (SGPT) 9 1 - 33 U/L   Cardiovascular Risk Assessment   Result Value Ref Range    Interpretation Note    Diabetes Patient Education   Result Value Ref Range    PDF Image Not applicable           Assessment/Plan   Problems Addressed this Visit        Cardiovascular and Mediastinum    Hypertension    High cholesterol       Digestive    Vitamin D deficiency       Endocrine    Uncontrolled type 2 diabetes mellitus with hyperglycemia (CMS/[facility]) - Primary    Relevant Medications    Insulin Glargine, 2 Unit Dial, (TOUJEO MAX SOLOSTAR) 300 UNIT/ML solution pen-injector injection    Semaglutide,0.25 or 0.5MG/DOS, (OZEMPIC, 0.25 OR 0.5 MG/DOSE,) 2 MG/1.5ML solution pen-injector       Other    Atherogenic dyslipidemia          In summary patient was seen and examined.  She we will start Ozempic 0.25 mg weekly for the first 4 weeks then increase to 0.5 mg weekly.  She will start Toujeo 60 units once daily and titrate by 2 units every 3 days until her morning blood sugars are at goal of less than 110 mg/dL.  She is to stop at 100 units.  She is to keep in contact with the office via my chart should she have any concerns or problems getting her medications.  She is to monitor her blood sugars at least 2-3 times daily.  She was given a new blood glucose meter today's visit.  She is been advised to monitor closely for hypoglycemia.  She will continue her Synjardy.  Her labs were reviewed and she was provided a copy.  Her hemoglobin A1c reflects uncontrolled diabetes.  Her cholesterol and blood pressure in satisfactory range.  She will follow-up in 6 months with dr virk or myself with labs prior.  She is been encouraged to reach out should she have any concerns prior

## 2019-12-06 NOTE — PATIENT INSTRUCTIONS
ozempic 0.25 mg for 4 weeks then increase to 0.5 mg weekly thereafter weekly  Increase toujeo to 60 units and titrate by 2 units every 3 days if morning bs is greater than 110  Contact mychart if any problems with getting meds

## 2019-12-17 ENCOUNTER — TRANSCRIBE ORDERS (OUTPATIENT)
Dept: ADMINISTRATIVE | Facility: HOSPITAL | Age: 59
End: 2019-12-17

## 2019-12-17 DIAGNOSIS — Z78.0 POSTMENOPAUSAL: Primary | ICD-10-CM

## 2020-01-20 ENCOUNTER — LAB (OUTPATIENT)
Dept: LAB | Facility: HOSPITAL | Age: 60
End: 2020-01-20

## 2020-01-20 ENCOUNTER — HOSPITAL ENCOUNTER (OUTPATIENT)
Dept: BONE DENSITY | Facility: HOSPITAL | Age: 60
Discharge: HOME | End: 2020-01-20
Admitting: ORTHOPAEDIC SURGERY

## 2020-01-20 ENCOUNTER — TRANSCRIBE ORDERS (OUTPATIENT)
Dept: LAB | Facility: HOSPITAL | Age: 60
End: 2020-01-20

## 2020-01-20 DIAGNOSIS — Z01.818 PREOP EXAMINATION: ICD-10-CM

## 2020-01-20 DIAGNOSIS — S72.433K: ICD-10-CM

## 2020-01-20 DIAGNOSIS — E55.9 VITAMIN D DEFICIENCY: Primary | ICD-10-CM

## 2020-01-20 DIAGNOSIS — S72.423K: ICD-10-CM

## 2020-01-20 DIAGNOSIS — E55.9 VITAMIN D DEFICIENCY: ICD-10-CM

## 2020-01-20 DIAGNOSIS — Z78.0 POSTMENOPAUSAL: ICD-10-CM

## 2020-01-20 LAB
25(OH)D3 SERPL-MCNC: 27.3 NG/ML (ref 30–100)
ALBUMIN SERPL-MCNC: 4.5 G/DL (ref 3.5–5.2)
ALBUMIN/GLOB SERPL: 1.5 G/DL
ALP SERPL-CCNC: 91 U/L (ref 39–117)
ALT SERPL W P-5'-P-CCNC: 10 U/L (ref 1–33)
ANION GAP SERPL CALCULATED.3IONS-SCNC: 15.1 MMOL/L (ref 5–15)
AST SERPL-CCNC: 13 U/L (ref 1–32)
BILIRUB SERPL-MCNC: 0.2 MG/DL (ref 0.2–1.2)
BUN BLD-MCNC: 11 MG/DL (ref 6–20)
BUN/CREAT SERPL: 13.3 (ref 7–25)
CA-I BLD-MCNC: 5.4 MG/DL (ref 4.6–5.4)
CA-I SERPL ISE-MCNC: 1.35 MMOL/L (ref 1.15–1.35)
CALCIUM SPEC-SCNC: 9.8 MG/DL (ref 8.6–10.5)
CHLORIDE SERPL-SCNC: 99 MMOL/L (ref 98–107)
CO2 SERPL-SCNC: 25.9 MMOL/L (ref 22–29)
CREAT BLD-MCNC: 0.83 MG/DL (ref 0.57–1)
GFR SERPL CREATININE-BSD FRML MDRD: 71 ML/MIN/1.73
GLOBULIN UR ELPH-MCNC: 3.1 GM/DL
GLUCOSE BLD-MCNC: 249 MG/DL (ref 65–99)
POTASSIUM BLD-SCNC: 4.1 MMOL/L (ref 3.5–5.2)
PROT SERPL-MCNC: 7.6 G/DL (ref 6–8.5)
SODIUM BLD-SCNC: 140 MMOL/L (ref 136–145)

## 2020-01-20 PROCEDURE — 36415 COLL VENOUS BLD VENIPUNCTURE: CPT

## 2020-01-20 PROCEDURE — 82330 ASSAY OF CALCIUM: CPT

## 2020-01-20 PROCEDURE — 80053 COMPREHEN METABOLIC PANEL: CPT

## 2020-01-20 PROCEDURE — 82652 VIT D 1 25-DIHYDROXY: CPT

## 2020-01-20 PROCEDURE — 82306 VITAMIN D 25 HYDROXY: CPT

## 2020-01-20 PROCEDURE — 77080 DXA BONE DENSITY AXIAL: CPT

## 2020-01-21 LAB — 1,25(OH)2D3 SERPL-MCNC: 43.1 PG/ML (ref 19.9–79.3)

## 2020-05-26 ENCOUNTER — LAB (OUTPATIENT)
Dept: ENDOCRINOLOGY | Age: 60
End: 2020-05-26

## 2020-05-26 DIAGNOSIS — E11.65 UNCONTROLLED TYPE 2 DIABETES MELLITUS WITH HYPERGLYCEMIA (HCC): ICD-10-CM

## 2020-05-26 DIAGNOSIS — E78.00 HIGH CHOLESTEROL: ICD-10-CM

## 2020-05-26 DIAGNOSIS — E78.00 HIGH CHOLESTEROL: Primary | ICD-10-CM

## 2020-05-26 DIAGNOSIS — E55.9 VITAMIN D DEFICIENCY: ICD-10-CM

## 2020-05-26 DIAGNOSIS — E11.9 TYPE 2 DIABETES MELLITUS WITHOUT COMPLICATION, WITHOUT LONG-TERM CURRENT USE OF INSULIN (HCC): ICD-10-CM

## 2020-05-26 DIAGNOSIS — E78.5 ATHEROGENIC DYSLIPIDEMIA: ICD-10-CM

## 2020-05-27 LAB
25(OH)D3+25(OH)D2 SERPL-MCNC: 22.5 NG/ML (ref 30–100)
ALBUMIN SERPL-MCNC: 4.3 G/DL (ref 3.5–5.2)
ALBUMIN/GLOB SERPL: 1.4 G/DL
ALP SERPL-CCNC: 106 U/L (ref 39–117)
ALT SERPL-CCNC: 11 U/L (ref 1–33)
AST SERPL-CCNC: 14 U/L (ref 1–32)
BILIRUB SERPL-MCNC: <0.2 MG/DL (ref 0.2–1.2)
BUN SERPL-MCNC: 14 MG/DL (ref 6–20)
BUN/CREAT SERPL: 12.2 (ref 7–25)
C PEPTIDE SERPL-MCNC: 4.7 NG/ML (ref 1.1–4.4)
CALCIUM SERPL-MCNC: 10 MG/DL (ref 8.6–10.5)
CHLORIDE SERPL-SCNC: 94 MMOL/L (ref 98–107)
CHOLEST SERPL-MCNC: 194 MG/DL (ref 0–200)
CO2 SERPL-SCNC: 29.6 MMOL/L (ref 22–29)
CREAT SERPL-MCNC: 1.15 MG/DL (ref 0.57–1)
GLOBULIN SER CALC-MCNC: 3 GM/DL
GLUCOSE SERPL-MCNC: 154 MG/DL (ref 65–99)
HBA1C MFR BLD: 8.4 % (ref 4.8–5.6)
HDLC SERPL-MCNC: 64 MG/DL (ref 40–60)
INTERPRETATION: NORMAL
LDLC SERPL CALC-MCNC: 55 MG/DL (ref 0–100)
Lab: NORMAL
MICROALBUMIN UR-MCNC: 10.3 UG/ML
POTASSIUM SERPL-SCNC: 4.8 MMOL/L (ref 3.5–5.2)
PROT SERPL-MCNC: 7.3 G/DL (ref 6–8.5)
SODIUM SERPL-SCNC: 137 MMOL/L (ref 136–145)
T4 SERPL-MCNC: 6.36 MCG/DL (ref 4.5–11.7)
TRIGL SERPL-MCNC: 376 MG/DL (ref 0–150)
TSH SERPL DL<=0.005 MIU/L-ACNC: 0.84 UIU/ML (ref 0.27–4.2)
URATE SERPL-MCNC: 5.5 MG/DL (ref 2.4–5.7)
VLDLC SERPL CALC-MCNC: 75.2 MG/DL

## 2020-06-08 ENCOUNTER — TELEPHONE (OUTPATIENT)
Dept: ENDOCRINOLOGY | Age: 60
End: 2020-06-08

## 2020-06-08 ENCOUNTER — OFFICE VISIT (OUTPATIENT)
Dept: ENDOCRINOLOGY | Age: 60
End: 2020-06-08

## 2020-06-08 VITALS
SYSTOLIC BLOOD PRESSURE: 130 MMHG | WEIGHT: 269.4 LBS | DIASTOLIC BLOOD PRESSURE: 80 MMHG | BODY MASS INDEX: 40.83 KG/M2 | HEIGHT: 68 IN | RESPIRATION RATE: 16 BRPM

## 2020-06-08 DIAGNOSIS — E55.9 VITAMIN D DEFICIENCY: ICD-10-CM

## 2020-06-08 DIAGNOSIS — E11.65 UNCONTROLLED TYPE 2 DIABETES MELLITUS WITH HYPERGLYCEMIA (HCC): Primary | ICD-10-CM

## 2020-06-08 DIAGNOSIS — I10 ESSENTIAL HYPERTENSION: ICD-10-CM

## 2020-06-08 DIAGNOSIS — E78.5 ATHEROGENIC DYSLIPIDEMIA: ICD-10-CM

## 2020-06-08 DIAGNOSIS — E78.00 HIGH CHOLESTEROL: ICD-10-CM

## 2020-06-08 PROCEDURE — 99214 OFFICE O/P EST MOD 30 MIN: CPT | Performed by: NURSE PRACTITIONER

## 2020-06-08 RX ORDER — FLUCONAZOLE 150 MG/1
150 TABLET ORAL ONCE
Qty: 1 TABLET | Refills: 3 | Status: SHIPPED | OUTPATIENT
Start: 2020-06-08 | End: 2020-06-08

## 2020-06-08 RX ORDER — TERIPARATIDE 250 UG/ML
INJECTION, SOLUTION SUBCUTANEOUS
COMMUNITY
Start: 2020-05-26 | End: 2022-03-01

## 2020-06-08 NOTE — TELEPHONE ENCOUNTER
Recent lab results faxed to Dr Alcides Ramirez office       ----- Message from SAVANNAH Stone sent at 6/8/2020  1:23 PM EDT -----  Please fax recent labs to dr alcides [facility]

## 2020-06-08 NOTE — PATIENT INSTRUCTIONS
Increase insulin to 80 units and titrate up by 2 units every 3 days to target morning blood sugars to less than 110 mg/dl  trulicity 0.75 mg weekly

## 2020-06-08 NOTE — PROGRESS NOTES
"Subjective   Ashley Malone is a 60 y.o. female is here today for follow-up.  Chief Complaint   Patient presents with   • Diabetes     lab review; denies problems or concerns; checking BG once a day; no BG readings   • Hyperlipidemia   • Hypertension     /80   Resp 16   Ht 172.7 cm (68\")   Wt 122 kg (269 lb 6.4 oz)   BMI 40.96 kg/m²   Current Outpatient Medications on File Prior to Visit   Medication Sig   • AMLODIPINE BESYLATE PO Take 5 mg by mouth daily with breakfast.   • Armodafinil (NUVIGIL PO) Take 250 mg by mouth every morning.   • Celecoxib (CELEBREX PO) Take 200 mg by mouth every morning.   • clopidogrel (PLAVIX) 75 MG tablet Take 75 mg by mouth daily.   • ergocalciferol (DRISDOL) 87731 units capsule Take 1 capsule by mouth 1 (One) Time Per Week.   • FORTEO 600 MCG/2.4ML injection    • GABAPENTIN PO Take 200 mg by mouth 3 (three) times a day.   • HYDROcodone-acetaminophen (NORCO)  MG per tablet Take 1 tablet by mouth every 4 (four) hours as needed for moderate pain (4-6).   • Hyoscyamine Sulfate (LEVBID PO) Take 1.25 mg by mouth daily as needed.   • Insulin Glargine, 2 Unit Dial, (TOUJEO MAX SOLOSTAR) 300 UNIT/ML solution pen-injector injection Titrate up to 100 units daily   • lisinopril (PRINIVIL,ZESTRIL) 40 MG tablet Take 40 mg by mouth Daily.   • METOPROLOL SUCCINATE ER PO Take 40 mg by mouth every morning.   • MONTELUKAST SODIUM PO Take 10 mg by mouth every morning.   • ondansetron ODT (ZOFRAN-ODT) 4 MG disintegrating tablet Take 1 tablet by mouth 4 (Four) Times a Day As Needed for Nausea or Vomiting.   • ONETOUCH DELICA LANCETS FINE misc 1 each 3 (Three) Times a Day.   • ONETOUCH VERIO test strip Test 2-3 times daily Use as instructed   • rOPINIRole (REQUIP) 4 MG tablet Take 4 mg by mouth at night as needed.   • rosuvastatin (CRESTOR) 40 MG tablet Take 1 tablet by mouth Daily.   • SYNJARDY XR 12.5-1000 MG tablet sustained-release 24 hour Take 2 tablets by mouth Daily With Breakfast. "   • TRAZODONE HCL PO Take 300 mg by mouth every night.   • [DISCONTINUED] Semaglutide,0.25 or 0.5MG/DOS, (OZEMPIC, 0.25 OR 0.5 MG/DOSE,) 2 MG/1.5ML solution pen-injector Inject 0.5 mg under the skin into the appropriate area as directed 1 (One) Time Per Week.     No current facility-administered medications on file prior to visit.      History reviewed. No pertinent family history.  Social History     Tobacco Use   • Smoking status: Never Smoker   • Smokeless tobacco: Never Used   Substance Use Topics   • Alcohol use: Yes     Comment: RARELY   • Drug use: No     Allergies   Allergen Reactions   • Cortisone Other (See Comments)     TIA-POST INJECTION- KNEES   • Augmentin [Amoxicillin-Pot Clavulanate] GI Intolerance   • Buffered Aspirin Unknown (See Comments)     Nose bleeds         History of Present Illness   Encounter Diagnoses   Name Primary?   • Atherogenic dyslipidemia    • High cholesterol    • Essential hypertension    • Uncontrolled type 2 diabetes mellitus with hyperglycemia (CMS/[facility]) Yes   • Vitamin D deficiency    60-year-old female patient here today for routine follow-up visit.  She has been seen for the above-mentioned problems.  She has a history of spontaneous fractures in her pelvis and is on Forteo.  She is requesting a copy of her labs to be sent to her orthopedic doctor.  She has not been checking her blood sugars very much.  She states she does not have much luck with doing the fingersticks.  She when she does test her blood sugars are normally in the 150 range in the mornings.  She was given a FreeStyle natalie in the past however her insurance would not cover it.  She also states her insurance would not cover Ozempic.  She was placed on Trulicity at today's visit and given a sample and a coupon.  If she continues have problems getting this medication she is to let the office know.  She has no complaints or concerns at today's visit.  Her A1c has improved by 2 points however it is not at goal.   She is currently on 75 units of Toujeo insulin.  We discussed increasing her Toujeo to 80 units and continue to titrate by 2 units every 3 days.  Target for morning blood sugars is less than 110 mg/dL      The following portions of the patient's history were reviewed and updated as appropriate: allergies, current medications, past family history, past medical history, past social history, past surgical history and problem list.    Review of Systems   Constitutional: Negative for fatigue and fever.   Respiratory: Negative for cough and shortness of breath.    Cardiovascular: Negative for chest pain.   Neurological: Positive for headaches. Negative for dizziness and light-headedness.   Psychiatric/Behavioral: Negative for sleep disturbance.       Objective   Physical Exam   Constitutional: She is oriented to person, place, and time. She appears well-developed and well-nourished. No distress.   HENT:   Head: Normocephalic and atraumatic.   Right Ear: External ear normal.   Left Ear: External ear normal.   Nose: Nose normal.   Mouth/Throat: Oropharynx is clear and moist. No oropharyngeal exudate.   Eyes: Pupils are equal, round, and reactive to light. EOM are normal. Right eye exhibits no discharge. Left eye exhibits no discharge.   Neck: Trachea normal, normal range of motion and full passive range of motion without pain. Neck supple. No tracheal tenderness present. Carotid bruit is not present. No tracheal deviation, no edema and no erythema present. No thyroid mass and no thyromegaly present.   Cardiovascular: Normal rate, regular rhythm, normal heart sounds and intact distal pulses. Exam reveals no gallop and no friction rub.   No murmur heard.  Pulmonary/Chest: Effort normal and breath sounds normal. No stridor. No respiratory distress. She has no wheezes. She has no rales.   Abdominal: Soft. Bowel sounds are normal. She exhibits no distension.   Musculoskeletal: Normal range of motion. She exhibits no edema or  deformity.     Unsteady gait following pelvic fractures is no longer using a walker   Lymphadenopathy:     She has no cervical adenopathy.   Neurological: She is alert and oriented to person, place, and time.   Skin: Skin is warm and dry. No rash noted. She is not diaphoretic. No erythema. No pallor.   Psychiatric: She has a normal mood and affect. Her behavior is normal. Judgment and thought content normal.   Nursing note and vitals reviewed.    Results for orders placed or performed in visit on 05/26/20   T4 & TSH ([facility])   Result Value Ref Range    TSH 0.837 0.270 - 4.200 uIU/mL    T4, Total 6.36 4.50 - 11.70 mcg/dL   Vitamin D 25 Hydroxy   Result Value Ref Range    25 Hydroxy, Vitamin D 22.5 (L) 30.0 - 100.0 ng/ml   Uric Acid   Result Value Ref Range    Uric Acid 5.5 2.4 - 5.7 mg/dL   MicroAlbumin, Urine, Random - Urine, Clean Catch   Result Value Ref Range    Microalbumin, Urine 10.3 Not Estab. ug/mL   Lipid Panel   Result Value Ref Range    Total Cholesterol 194 0 - 200 mg/dL    Triglycerides 376 (H) 0 - 150 mg/dL    HDL Cholesterol 64 (H) 40 - 60 mg/dL    VLDL Cholesterol 75.2 mg/dL    LDL Cholesterol  55 0 - 100 mg/dL   Hemoglobin A1c   Result Value Ref Range    Hemoglobin A1C 8.40 (H) 4.80 - 5.60 %   C-Peptide   Result Value Ref Range    C-Peptide 4.7 (H) 1.1 - 4.4 ng/mL   Comprehensive Metabolic Panel   Result Value Ref Range    Glucose 154 (H) 65 - 99 mg/dL    BUN 14 6 - 20 mg/dL    Creatinine 1.15 (H) 0.57 - 1.00 mg/dL    eGFR Non African Am 48 (L) >60 mL/min/1.73    eGFR African Am 59 (L) >60 mL/min/1.73    BUN/Creatinine Ratio 12.2 7.0 - 25.0    Sodium 137 136 - 145 mmol/L    Potassium 4.8 3.5 - 5.2 mmol/L    Chloride 94 (L) 98 - 107 mmol/L    Total CO2 29.6 (H) 22.0 - 29.0 mmol/L    Calcium 10.0 8.6 - 10.5 mg/dL    Total Protein 7.3 6.0 - 8.5 g/dL    Albumin 4.30 3.50 - 5.20 g/dL    Globulin 3.0 gm/dL    A/G Ratio 1.4 g/dL    Total Bilirubin <0.2 (L) 0.2 - 1.2 mg/dL    Alkaline Phosphatase 106 39 -  117 U/L    AST (SGOT) 14 1 - 32 U/L    ALT (SGPT) 11 1 - 33 U/L   Cardiovascular Risk Assessment   Result Value Ref Range    Interpretation Note    Diabetes Patient Education   Result Value Ref Range    PDF Image Not applicable          Assessment/Plan   Problems Addressed this Visit        Cardiovascular and Mediastinum    Hypertension    High cholesterol       Digestive    Vitamin D deficiency       Endocrine    Uncontrolled type 2 diabetes mellitus with hyperglycemia (CMS/HCC) - Primary       Other    Atherogenic dyslipidemia        Patient was seen and evaluated.  Metabolically and clinically she has improved however she is not at goal of less than 7.  Her A1c has improved by two-point since her last visit.  She is at risk for hypoglycemia on insulin.  She will be started on Trulicity 0.75 mg weekly.  She was given a sample as well as a coupon.  She will follow-up in our office in 6 months with labs prior.  A request has been sent to staff to forward her labs to her orthopedic doctor for review since she is on Forteo.  She has lost weight according to the scales.  She does have issues with mobility since having multiple fractures.  She is no longer walking with a walker.

## 2020-06-10 ENCOUNTER — PRIOR AUTHORIZATION (OUTPATIENT)
Dept: ENDOCRINOLOGY | Age: 60
End: 2020-06-10

## 2020-07-20 RX ORDER — ROSUVASTATIN CALCIUM 40 MG/1
TABLET, COATED ORAL
Qty: 90 TABLET | Refills: 3 | Status: SHIPPED | OUTPATIENT
Start: 2020-07-20

## 2020-08-04 RX ORDER — EMPAGLIFLOZIN, METFORMIN HYDROCHLORIDE 12.5; 1 MG/1; MG/1
2 TABLET, EXTENDED RELEASE ORAL
Qty: 60 TABLET | Refills: 11 | Status: SHIPPED | OUTPATIENT
Start: 2020-08-04 | End: 2021-09-07

## 2020-11-29 RX ORDER — DULAGLUTIDE 0.75 MG/.5ML
INJECTION, SOLUTION SUBCUTANEOUS
Refills: 5 | OUTPATIENT
Start: 2020-11-29

## 2020-12-17 RX ORDER — DULAGLUTIDE 0.75 MG/.5ML
INJECTION, SOLUTION SUBCUTANEOUS
Qty: 1 PEN | Refills: 0 | OUTPATIENT
Start: 2020-12-17

## 2020-12-18 ENCOUNTER — TELEPHONE (OUTPATIENT)
Dept: ENDOCRINOLOGY | Age: 60
End: 2020-12-18

## 2020-12-18 RX ORDER — INSULIN GLARGINE 100 [IU]/ML
100 INJECTION, SOLUTION SUBCUTANEOUS DAILY
Qty: 13.5 ML | Refills: 2 | Status: SHIPPED | OUTPATIENT
Start: 2020-12-18 | End: 2021-01-07 | Stop reason: SDUPTHER

## 2020-12-18 NOTE — TELEPHONE ENCOUNTER
----- Message from SAVANNAH Stone sent at 12/18/2020  1:36 PM EST -----  A prescription was sent for patient's requested Basaglar I am not sure if we have any samples that she is requesting you can look and see if we have 1 or 2 pens to give her.  According to her chart she has been on Toujeo in the past so Toujeo or Basaglar can be used interchangeably.    Lm that samples of basaglar are ready for

## 2020-12-18 NOTE — TELEPHONE ENCOUNTER
PT WANTED TO GET SAMPLES OR PRESCRIPTION OF MED.  I COULD NOT FIND IT IN HER CHART BUT PT SAYS SHE HAS BEEN GIVEN IT BEFORE.

## 2020-12-18 NOTE — TELEPHONE ENCOUNTER
PT WANTED TO GET SAMPLES OR PRESCRIPTION OF MED.  I COULD NOT FIND IT IN HER CHART BUT PT SAYS SHE HAS BEEN GIVEN IT BEFORE.      nv 5/21 daylin rob 6/20 daylin

## 2020-12-23 ENCOUNTER — TELEPHONE (OUTPATIENT)
Dept: ENDOCRINOLOGY | Age: 60
End: 2020-12-23

## 2021-01-07 DIAGNOSIS — E11.9 TYPE 2 DIABETES MELLITUS WITHOUT COMPLICATION, WITHOUT LONG-TERM CURRENT USE OF INSULIN (HCC): Primary | ICD-10-CM

## 2021-01-07 RX ORDER — INSULIN GLARGINE 100 [IU]/ML
100 INJECTION, SOLUTION SUBCUTANEOUS DAILY
Qty: 10 PEN | Refills: 1 | Status: SHIPPED | OUTPATIENT
Start: 2021-01-07 | End: 2021-04-08 | Stop reason: SDUPTHER

## 2021-01-07 RX ORDER — INSULIN GLARGINE 100 [IU]/ML
100 INJECTION, SOLUTION SUBCUTANEOUS DAILY
Qty: 13.5 ML | Refills: 2 | Status: CANCELLED | OUTPATIENT
Start: 2021-01-07

## 2021-01-07 NOTE — TELEPHONE ENCOUNTER
PT CALLED HAS APPOINTMENT SET FOR April 8 AT 2:15 PM BUT NEEDS  MED REFILL FOR Insulin Glargine (BASAGLAR KWIKPEN) 100 UNIT/ML injection pen    PT SAYS SHE IS DOWN TO HER LAST PIN. CAN BE SENT TO PHARM:   Pharmacy:  [id number] - [facility], [place] - [address] AT [facility] - [phone]  - [phone] FX   Phone:  [phone]   Fax:  [phone]   Address:  [address], [facility] [address]   Pharmacy Comments: --

## 2021-01-21 RX ORDER — DULAGLUTIDE 0.75 MG/.5ML
INJECTION, SOLUTION SUBCUTANEOUS
Qty: 2 ML | Refills: 2 | Status: SHIPPED | OUTPATIENT
Start: 2021-01-21 | End: 2021-04-08

## 2021-02-23 ENCOUNTER — TELEPHONE (OUTPATIENT)
Dept: ENDOCRINOLOGY | Age: 61
End: 2021-02-23

## 2021-03-19 ENCOUNTER — BULK ORDERING (OUTPATIENT)
Dept: CASE MANAGEMENT | Facility: OTHER | Age: 61
End: 2021-03-19

## 2021-03-19 DIAGNOSIS — Z23 IMMUNIZATION DUE: ICD-10-CM

## 2021-04-08 ENCOUNTER — OFFICE VISIT (OUTPATIENT)
Dept: ENDOCRINOLOGY | Age: 61
End: 2021-04-08

## 2021-04-08 VITALS
DIASTOLIC BLOOD PRESSURE: 94 MMHG | WEIGHT: 269 LBS | SYSTOLIC BLOOD PRESSURE: 146 MMHG | HEIGHT: 68 IN | BODY MASS INDEX: 40.77 KG/M2

## 2021-04-08 DIAGNOSIS — E78.5 ATHEROGENIC DYSLIPIDEMIA: ICD-10-CM

## 2021-04-08 DIAGNOSIS — E55.9 VITAMIN D DEFICIENCY: ICD-10-CM

## 2021-04-08 DIAGNOSIS — E11.9 TYPE 2 DIABETES MELLITUS WITHOUT COMPLICATION, WITHOUT LONG-TERM CURRENT USE OF INSULIN (HCC): Primary | ICD-10-CM

## 2021-04-08 DIAGNOSIS — I10 ESSENTIAL HYPERTENSION: ICD-10-CM

## 2021-04-08 PROCEDURE — 99214 OFFICE O/P EST MOD 30 MIN: CPT | Performed by: NURSE PRACTITIONER

## 2021-04-08 RX ORDER — AMLODIPINE BESYLATE 10 MG/1
TABLET ORAL
COMMUNITY
Start: 2021-02-19

## 2021-04-08 RX ORDER — CELECOXIB 200 MG/1
CAPSULE ORAL
COMMUNITY
Start: 2021-02-09

## 2021-04-08 RX ORDER — NITROFURANTOIN 25; 75 MG/1; MG/1
CAPSULE ORAL
COMMUNITY
End: 2021-04-08

## 2021-04-08 RX ORDER — TRAZODONE HYDROCHLORIDE 100 MG/1
300 TABLET ORAL NIGHTLY
COMMUNITY
Start: 2021-03-31

## 2021-04-08 RX ORDER — FLASH GLUCOSE SENSOR
1 KIT MISCELLANEOUS ONCE
Qty: 1 EACH | Refills: 0 | Status: SHIPPED | OUTPATIENT
Start: 2021-04-08 | End: 2021-04-08

## 2021-04-08 RX ORDER — FLASH GLUCOSE SENSOR
1 KIT MISCELLANEOUS
Qty: 2 EACH | Refills: 5 | Status: SHIPPED | OUTPATIENT
Start: 2021-04-08 | End: 2022-03-24 | Stop reason: SDUPTHER

## 2021-04-08 RX ORDER — INSULIN GLARGINE 100 [IU]/ML
100 INJECTION, SOLUTION SUBCUTANEOUS DAILY
Qty: 30 PEN | Refills: 1 | Status: SHIPPED | OUTPATIENT
Start: 2021-04-08 | End: 2021-10-01 | Stop reason: SDUPTHER

## 2021-04-08 RX ORDER — LANSOPRAZOLE 30 MG/1
30 CAPSULE, DELAYED RELEASE ORAL DAILY
COMMUNITY
Start: 2020-08-18 | End: 2021-08-18

## 2021-04-08 RX ORDER — SUMATRIPTAN 100 MG/1
100 TABLET, FILM COATED ORAL
COMMUNITY
Start: 2020-08-17 | End: 2022-03-01

## 2021-04-08 RX ORDER — ERGOCALCIFEROL 1.25 MG/1
50000 CAPSULE ORAL
COMMUNITY
Start: 2021-02-08 | End: 2021-04-09 | Stop reason: SDUPTHER

## 2021-04-08 RX ORDER — DULAGLUTIDE 1.5 MG/.5ML
1.5 INJECTION, SOLUTION SUBCUTANEOUS WEEKLY
Qty: 12 PEN | Refills: 1 | Status: SHIPPED | OUTPATIENT
Start: 2021-04-08 | End: 2021-10-01

## 2021-04-08 NOTE — PROGRESS NOTES
"  Subjective    Ashley Malone is a 61 y.o. female. she is here today for follow-up.  Chief Complaint   Patient presents with   • Diabetes     /94   Ht 172.7 cm (67.99\")   Wt 122 kg (269 lb)   BMI 40.91 kg/m²       History of Present Illness   Encounter Diagnoses   Name Primary?   • Type 2 diabetes mellitus without complication, without long-term current use of insulin (CMS/[facility]) Yes   • Atherogenic dyslipidemia    • Essential hypertension    • Vitamin D deficiency      61-year-old female patient here today for follow-up visit.  She has been seen for the above-mentioned problems.  She is wanting to get a printed prescription for freestyle natalie.  She denies any neuropathy in her feet.  She had a bone density not too long ago which was reviewed.  She showed some decline in her hip of 6%.  She is currently taking Forteo by injection daily.  She has a year and a half left ago.  She is under a lot of stress at work and feels this is why her blood pressure is elevated at today's visit.  The gabapentin is controlling her neuropathy.  She is due for labs following today's visit.  Her medications were reviewed.  We discussed increasing Trulicity to 1.5.  She reports her highest blood sugars in the 240 range.    The following portions of the patient's history were reviewed and updated as appropriate:   Past Medical History:   Diagnosis Date   • Awareness under anesthesia     X2   • Back pain    • Depression    • Dizziness    • Elevated blood sugar level     PT STATES THIS WAS AFTER RECEIVING CORTISONE INJECTIONS IN KNEES   • Frequent UTI     CURRENTLY   • GERD (gastroesophageal reflux disease)    • High cholesterol    • History of Clostridium difficile 2012   • History of TIAs     LAST ONE 2 YRS AGO   • Hypertension    • IBS (irritable bowel syndrome)    • Left ankle pain    • Leg pain, right    • On home oxygen therapy     2-3L NC AT NIGHT   • Pancreas disorder     PT TAKES VICTOZA INJ    • PVC's (premature " ventricular contractions)    • Restless leg syndrome    • Type 2 diabetes mellitus (CMS/HCC)      Past Surgical History:   Procedure Laterality Date   • ANKLE SURGERY Right    • BACK SURGERY     • HYSTERECTOMY     • KNEE ARTHROPLASTY Right    • NASAL SINUS SURGERY      X3   • MO REVISE KNEE JOINT REPLACE,1 PART Right 8/15/2016    Procedure: RT TOTAL KNEE ARTHROPLASTY REVISION;  Surgeon: Alcides Ramirez MD;  Location: [facility];  Service: Orthopedics     History reviewed. No pertinent family history.  OB History    No obstetric history on file.       Current Outpatient Medications   Medication Sig Dispense Refill   • amLODIPine (NORVASC) 10 MG tablet TAKE 1 TABLET BY MOUTH EVERY DAY AT NIGHT     • Armodafinil (NUVIGIL PO) Take 250 mg by mouth every morning.     • celecoxib (CeleBREX) 200 MG capsule TAKE 1 CAPSULE BY MOUTH TWICE DAILY AS NEEDED FOR PAIN     • clopidogrel (PLAVIX) 75 MG tablet Take 75 mg by mouth daily.     • ergocalciferol (ERGOCALCIFEROL) 1.25 MG (93435 UT) capsule Take 50,000 Units by mouth.     • FORTEO 600 MCG/2.4ML injection      • GABAPENTIN PO Take 200 mg by mouth 3 (three) times a day.     • HYDROcodone-acetaminophen (NORCO)  MG per tablet Take 1 tablet by mouth every 4 (four) hours as needed for moderate pain (4-6). 1 tablet 0   • Hyoscyamine Sulfate (LEVBID PO) Take 1.25 mg by mouth daily as needed.     • Insulin Glargine (BASAGLAR KWIKPEN) 100 UNIT/ML injection pen Inject 100 Units under the skin into the appropriate area as directed Daily for 30 days. 10 pen 1   • lansoprazole (PREVACID) 30 MG capsule Take 30 mg by mouth Daily.     • lisinopril (PRINIVIL,ZESTRIL) 40 MG tablet Take 40 mg by mouth Daily.     • METOPROLOL SUCCINATE ER PO Take 40 mg by mouth every morning.     • MONTELUKAST SODIUM PO Take 10 mg by mouth every morning.     • ondansetron ODT (ZOFRAN-ODT) 4 MG disintegrating tablet Take 1 tablet by mouth 4 (Four) Times a Day As Needed for Nausea or Vomiting. 15 tablet 0    • ONETOUCH DELICA LANCETS FINE misc 1 each 3 (Three) Times a Day. 100 each 5   • ONETOUCH VERIO test strip Test 2-3 times daily Use as instructed 100 each 5   • rOPINIRole (REQUIP) 4 MG tablet Take 4 mg by mouth at night as needed.     • rosuvastatin (CRESTOR) 40 MG tablet TAKE 1 TABLET BY MOUTH EVERY DAY 90 tablet 3   • SYNJARDY XR 12.5-1000 MG tablet sustained-release 24 hour TAKE 2 TABLETS BY MOUTH DAILY WITH BREAKFAST. 60 tablet 11   • traZODone (DESYREL) 100 MG tablet Take 300 mg by mouth.     • Trulicity 0.75 MG/0.5ML solution pen-injector ADMINISTER 0.75 MG UNDER THE SKIN ONCE WEEKLY 2 mL 2   • nitrofurantoin, macrocrystal-monohydrate, (MACROBID) 100 MG capsule nitrofurantoin monohydrate/macrocrystals 100 mg capsule     • SUMAtriptan (IMITREX) 100 MG tablet Take 100 mg by mouth.       No current facility-administered medications for this visit.     Allergies   Allergen Reactions   • Cortisone Other (See Comments)     TIA-POST INJECTION- KNEES   • Augmentin [Amoxicillin-Pot Clavulanate] GI Intolerance   • Buffered Aspirin Unknown (See Comments)     Nose bleeds     Social History     Socioeconomic History   • Marital status:      Spouse name: Not on file   • Number of children: Not on file   • Years of education: Not on file   • Highest education level: Not on file   Tobacco Use   • Smoking status: Never Smoker   • Smokeless tobacco: Never Used   Substance and Sexual Activity   • Alcohol use: Yes     Comment: RARELY   • Drug use: No   • Sexual activity: Defer       Review of Systems  Review of Systems   Constitutional: Negative for appetite change and fatigue.   Eyes: Negative for visual disturbance.   Respiratory: Negative for cough.    Cardiovascular: Negative for leg swelling.   Gastrointestinal: Negative for constipation and diarrhea.   Endocrine: Negative for cold intolerance, heat intolerance, polydipsia, polyphagia and polyuria.   Genitourinary: Negative for frequency.   Neurological: Negative  "for numbness.        Objective    /94   Ht 172.7 cm (67.99\")   Wt 122 kg (269 lb)   BMI 40.91 kg/m²   Physical Exam  Vitals and nursing note reviewed.   Constitutional:       General: She is not in acute distress.     Appearance: Normal appearance. She is well-developed. She is obese. She is not diaphoretic.   HENT:      Head: Normocephalic and atraumatic.      Right Ear: External ear normal.      Left Ear: External ear normal.      Nose: Nose normal.      Mouth/Throat:      Pharynx: No oropharyngeal exudate.   Eyes:      General:         Right eye: No discharge.         Left eye: No discharge.      Pupils: Pupils are equal, round, and reactive to light.   Neck:      Thyroid: No thyroid mass or thyromegaly.      Vascular: No carotid bruit.      Trachea: Trachea normal. No tracheal tenderness or tracheal deviation.   Cardiovascular:      Rate and Rhythm: Normal rate and regular rhythm.      Heart sounds: Normal heart sounds. No murmur heard.   No friction rub. No gallop.    Pulmonary:      Effort: Pulmonary effort is normal. No respiratory distress.      Breath sounds: Normal breath sounds. No stridor. No wheezing or rales.   Abdominal:      General: Bowel sounds are normal. There is no distension.      Palpations: Abdomen is soft.   Musculoskeletal:         General: No deformity. Normal range of motion.      Cervical back: Full passive range of motion without pain, normal range of motion and neck supple. No edema or erythema.   Lymphadenopathy:      Cervical: No cervical adenopathy.   Skin:     General: Skin is warm and dry.      Coloration: Skin is not pale.      Findings: No erythema or rash.   Neurological:      Mental Status: She is alert and oriented to person, place, and time.   Psychiatric:         Behavior: Behavior normal.         Thought Content: Thought content normal.         Judgment: Judgment normal.         Lab Review  Glucose (mg/dL)   Date Value   01/20/2020 249 (H)   01/28/2018 225 (H) "   08/26/2016 130 (H)     Sodium (mmol/L)   Date Value   08/17/2020 135 (L)   05/26/2020 137   02/19/2020 137   01/20/2020 140   11/21/2019 139   07/29/2019 136   01/28/2018 133 (L)   08/26/2016 142     Potassium (mmol/L)   Date Value   08/17/2020 5.3   05/26/2020 4.8   02/28/2020 3.8   02/19/2020 4.6     Chloride (mmol/L)   Date Value   08/17/2020 94 (L)   05/26/2020 94 (L)   02/19/2020 97 (L)   01/20/2020 99   11/21/2019 98   07/29/2019 92 (L)   01/28/2018 87 (L)   08/26/2016 94 (L)     CO2 (mmol/L)   Date Value   01/20/2020 25.9   01/28/2018 25.9   08/26/2016 37.9 (H)     Total CO2 (mmol/L)   Date Value   08/17/2020 31 (H)   05/26/2020 29.6 (H)   02/19/2020 30   11/21/2019 23.0   07/29/2019 32.6 (H)     BUN (mg/dL)   Date Value   08/17/2020 8   05/26/2020 14   02/19/2020 10   01/20/2020 11   11/21/2019 14   07/29/2019 10   01/28/2018 10   08/26/2016 6     Creatinine (mg/dL)   Date Value   08/17/2020 0.8   05/26/2020 1.15 (H)   02/19/2020 0.8   01/20/2020 0.83   11/21/2019 0.84   07/29/2019 0.82   01/28/2018 0.93   08/26/2016 0.63     Hemoglobin A1C (%)   Date Value   08/17/2020 8.1 (H)   05/26/2020 8.40 (H)   11/21/2019 10.40 (H)   07/29/2019 11.60 (H)   08/25/2016 5.40     Triglycerides (mg/dL)   Date Value   08/17/2020 165 (H)   05/26/2020 376 (H)   11/21/2019 258 (H)   07/29/2019 287 (H)     LDL Cholesterol  (mg/dL)   Date Value   08/17/2020 53   05/26/2020 55   11/21/2019 58   09/17/2014 193 (H)   09/17/2014 232 (H)       Assessment/Plan    No diagnosis found..    Medications prescribed:  Outpatient Encounter Medications as of 4/8/2021   Medication Sig Dispense Refill   • amLODIPine (NORVASC) 10 MG tablet TAKE 1 TABLET BY MOUTH EVERY DAY AT NIGHT     • Armodafinil (NUVIGIL PO) Take 250 mg by mouth every morning.     • celecoxib (CeleBREX) 200 MG capsule TAKE 1 CAPSULE BY MOUTH TWICE DAILY AS NEEDED FOR PAIN     • clopidogrel (PLAVIX) 75 MG tablet Take 75 mg by mouth daily.     • ergocalciferol (ERGOCALCIFEROL)  1.25 MG (02817 UT) capsule Take 50,000 Units by mouth.     • FORTEO 600 MCG/2.4ML injection      • GABAPENTIN PO Take 200 mg by mouth 3 (three) times a day.     • HYDROcodone-acetaminophen (NORCO)  MG per tablet Take 1 tablet by mouth every 4 (four) hours as needed for moderate pain (4-6). 1 tablet 0   • Hyoscyamine Sulfate (LEVBID PO) Take 1.25 mg by mouth daily as needed.     • Insulin Glargine (BASAGLAR KWIKPEN) 100 UNIT/ML injection pen Inject 100 Units under the skin into the appropriate area as directed Daily for 30 days. 10 pen 1   • lansoprazole (PREVACID) 30 MG capsule Take 30 mg by mouth Daily.     • lisinopril (PRINIVIL,ZESTRIL) 40 MG tablet Take 40 mg by mouth Daily.     • METOPROLOL SUCCINATE ER PO Take 40 mg by mouth every morning.     • MONTELUKAST SODIUM PO Take 10 mg by mouth every morning.     • ondansetron ODT (ZOFRAN-ODT) 4 MG disintegrating tablet Take 1 tablet by mouth 4 (Four) Times a Day As Needed for Nausea or Vomiting. 15 tablet 0   • ONETOUCH DELICA LANCETS FINE misc 1 each 3 (Three) Times a Day. 100 each 5   • ONETOUCH VERIO test strip Test 2-3 times daily Use as instructed 100 each 5   • rOPINIRole (REQUIP) 4 MG tablet Take 4 mg by mouth at night as needed.     • rosuvastatin (CRESTOR) 40 MG tablet TAKE 1 TABLET BY MOUTH EVERY DAY 90 tablet 3   • SYNJARDY XR 12.5-1000 MG tablet sustained-release 24 hour TAKE 2 TABLETS BY MOUTH DAILY WITH BREAKFAST. 60 tablet 11   • traZODone (DESYREL) 100 MG tablet Take 300 mg by mouth.     • Trulicity 0.75 MG/0.5ML solution pen-injector ADMINISTER 0.75 MG UNDER THE SKIN ONCE WEEKLY 2 mL 2   • nitrofurantoin, macrocrystal-monohydrate, (MACROBID) 100 MG capsule nitrofurantoin monohydrate/macrocrystals 100 mg capsule     • SUMAtriptan (IMITREX) 100 MG tablet Take 100 mg by mouth.     • [DISCONTINUED] AMLODIPINE BESYLATE PO Take 5 mg by mouth daily with breakfast.     • [DISCONTINUED] Celecoxib (CELEBREX PO) Take 200 mg by mouth every morning.     •  [DISCONTINUED] TRAZODONE HCL PO Take 300 mg by mouth every night.       No facility-administered encounter medications on file as of 4/8/2021.       Orders placed during this encounter include:  No orders of the defined types were placed in this encounter.      Labs pending she is up-to-date on her eye exam.  Neuropathy is controlled with gabapentin.  She had an eye exam in February and had a cataract removed and March and February.  She has had no hypoglycemic events.  She does report blood sugars in the 200 range.  I have increase her Trulicity to 1.5 mg weekly.  Blood pressure is elevated today due to stress at work.  She will follow up with a new provider her next visit.  She will have extensive labs following today's visit.  She has been encouraged to contact the office should she any questions or concerns.

## 2021-04-09 LAB
25(OH)D3+25(OH)D2 SERPL-MCNC: 24.2 NG/ML (ref 30–100)
ALBUMIN SERPL-MCNC: 4.3 G/DL (ref 3.5–5.2)
ALBUMIN/GLOB SERPL: 1.7 G/DL
ALP SERPL-CCNC: 95 U/L (ref 39–117)
ALT SERPL-CCNC: 11 U/L (ref 1–33)
AST SERPL-CCNC: 12 U/L (ref 1–32)
BILIRUB SERPL-MCNC: <0.2 MG/DL (ref 0–1.2)
BUN SERPL-MCNC: 20 MG/DL (ref 8–23)
BUN/CREAT SERPL: 21.5 (ref 7–25)
C PEPTIDE SERPL-MCNC: 7.2 NG/ML (ref 1.1–4.4)
CALCIUM SERPL-MCNC: 9.4 MG/DL (ref 8.6–10.5)
CHLORIDE SERPL-SCNC: 98 MMOL/L (ref 98–107)
CHOLEST SERPL-MCNC: 231 MG/DL (ref 0–200)
CO2 SERPL-SCNC: 30.3 MMOL/L (ref 22–29)
CREAT SERPL-MCNC: 0.93 MG/DL (ref 0.57–1)
GLOBULIN SER CALC-MCNC: 2.6 GM/DL
GLUCOSE SERPL-MCNC: 211 MG/DL (ref 65–99)
HBA1C MFR BLD: 8 % (ref 4.8–5.6)
HDLC SERPL-MCNC: 78 MG/DL (ref 40–60)
IMP & REVIEW OF LAB RESULTS: NORMAL
LDLC SERPL CALC-MCNC: 109 MG/DL (ref 0–100)
Lab: NORMAL
POTASSIUM SERPL-SCNC: 4.4 MMOL/L (ref 3.5–5.2)
PROT SERPL-MCNC: 6.9 G/DL (ref 6–8.5)
SODIUM SERPL-SCNC: 138 MMOL/L (ref 136–145)
T3FREE SERPL-MCNC: 2.5 PG/ML (ref 2–4.4)
T4 FREE SERPL-MCNC: 0.88 NG/DL (ref 0.93–1.7)
TRIGL SERPL-MCNC: 262 MG/DL (ref 0–150)
TSH SERPL DL<=0.005 MIU/L-ACNC: 1.9 UIU/ML (ref 0.27–4.2)
VLDLC SERPL CALC-MCNC: 44 MG/DL (ref 5–40)

## 2021-04-09 RX ORDER — ERGOCALCIFEROL 1.25 MG/1
50000 CAPSULE ORAL WEEKLY
Qty: 12 CAPSULE | Refills: 1 | Status: SHIPPED | OUTPATIENT
Start: 2021-04-09 | End: 2022-02-18 | Stop reason: SDUPTHER

## 2021-09-07 RX ORDER — EMPAGLIFLOZIN, METFORMIN HYDROCHLORIDE 12.5; 1 MG/1; MG/1
2 TABLET, EXTENDED RELEASE ORAL
Qty: 60 TABLET | Refills: 6 | Status: SHIPPED | OUTPATIENT
Start: 2021-09-07

## 2021-09-24 DIAGNOSIS — E11.9 TYPE 2 DIABETES MELLITUS WITHOUT COMPLICATION, WITHOUT LONG-TERM CURRENT USE OF INSULIN (HCC): Primary | ICD-10-CM

## 2021-09-25 LAB
25(OH)D3+25(OH)D2 SERPL-MCNC: 25.2 NG/ML (ref 30–100)
ALBUMIN SERPL-MCNC: 4.3 G/DL (ref 3.5–5.2)
ALBUMIN/GLOB SERPL: 1.7 G/DL
ALP SERPL-CCNC: 102 U/L (ref 39–117)
ALT SERPL-CCNC: 8 U/L (ref 1–33)
AST SERPL-CCNC: 11 U/L (ref 1–32)
BILIRUB SERPL-MCNC: <0.2 MG/DL (ref 0–1.2)
BUN SERPL-MCNC: 15 MG/DL (ref 8–23)
BUN/CREAT SERPL: 18.8 (ref 7–25)
CALCIUM SERPL-MCNC: 9.7 MG/DL (ref 8.6–10.5)
CHLORIDE SERPL-SCNC: 98 MMOL/L (ref 98–107)
CHOLEST SERPL-MCNC: 169 MG/DL (ref 0–200)
CO2 SERPL-SCNC: 29.1 MMOL/L (ref 22–29)
CREAT SERPL-MCNC: 0.8 MG/DL (ref 0.57–1)
GLOBULIN SER CALC-MCNC: 2.5 GM/DL
GLUCOSE SERPL-MCNC: 210 MG/DL (ref 65–99)
HBA1C MFR BLD: 8.5 % (ref 4.8–5.6)
HDLC SERPL-MCNC: 56 MG/DL (ref 40–60)
IMP & REVIEW OF LAB RESULTS: NORMAL
LDLC SERPL CALC-MCNC: 66 MG/DL (ref 0–100)
POTASSIUM SERPL-SCNC: 4.5 MMOL/L (ref 3.5–5.2)
PROT SERPL-MCNC: 6.8 G/DL (ref 6–8.5)
SODIUM SERPL-SCNC: 138 MMOL/L (ref 136–145)
TRIGL SERPL-MCNC: 301 MG/DL (ref 0–150)
VLDLC SERPL CALC-MCNC: 47 MG/DL (ref 5–40)

## 2021-10-01 ENCOUNTER — TELEPHONE (OUTPATIENT)
Dept: ENDOCRINOLOGY | Age: 61
End: 2021-10-01

## 2021-10-01 ENCOUNTER — OFFICE VISIT (OUTPATIENT)
Dept: ENDOCRINOLOGY | Age: 61
End: 2021-10-01

## 2021-10-01 ENCOUNTER — MEDICATION THERAPY MANAGEMENT (OUTPATIENT)
Dept: ENDOCRINOLOGY | Age: 61
End: 2021-10-01

## 2021-10-01 VITALS
DIASTOLIC BLOOD PRESSURE: 48 MMHG | SYSTOLIC BLOOD PRESSURE: 116 MMHG | HEIGHT: 68 IN | BODY MASS INDEX: 44.07 KG/M2 | WEIGHT: 290.8 LBS | OXYGEN SATURATION: 95 % | HEART RATE: 76 BPM

## 2021-10-01 DIAGNOSIS — E11.65 TYPE 2 DIABETES MELLITUS WITH HYPERGLYCEMIA, WITH LONG-TERM CURRENT USE OF INSULIN (HCC): Primary | ICD-10-CM

## 2021-10-01 DIAGNOSIS — Z79.4 TYPE 2 DIABETES MELLITUS WITH HYPERGLYCEMIA, WITH LONG-TERM CURRENT USE OF INSULIN (HCC): Primary | ICD-10-CM

## 2021-10-01 DIAGNOSIS — E78.00 HIGH CHOLESTEROL: ICD-10-CM

## 2021-10-01 DIAGNOSIS — R30.0 DYSURIA: ICD-10-CM

## 2021-10-01 PROCEDURE — 99214 OFFICE O/P EST MOD 30 MIN: CPT | Performed by: NURSE PRACTITIONER

## 2021-10-01 RX ORDER — DULAGLUTIDE 3 MG/.5ML
3 INJECTION, SOLUTION SUBCUTANEOUS WEEKLY
Qty: 6 ML | Refills: 1 | Status: SHIPPED | OUTPATIENT
Start: 2021-10-01 | End: 2022-07-05

## 2021-10-01 RX ORDER — INSULIN GLARGINE 100 [IU]/ML
INJECTION, SOLUTION SUBCUTANEOUS
Qty: 105 ML | Refills: 0 | Status: SHIPPED | OUTPATIENT
Start: 2021-10-01 | End: 2022-07-05

## 2021-10-01 RX ORDER — DULOXETIN HYDROCHLORIDE 60 MG/1
CAPSULE, DELAYED RELEASE ORAL
COMMUNITY
Start: 2021-07-26

## 2021-10-01 RX ORDER — CLOBETASOL PROPIONATE 0.5 MG/G
CREAM TOPICAL
COMMUNITY
Start: 2021-04-23 | End: 2022-03-01

## 2021-10-01 RX ORDER — FLUCONAZOLE 150 MG/1
150 TABLET ORAL ONCE
Qty: 2 TABLET | Refills: 0 | Status: SHIPPED | OUTPATIENT
Start: 2021-10-01 | End: 2021-10-03

## 2021-10-01 RX ORDER — TOPIRAMATE 25 MG/1
TABLET ORAL
COMMUNITY
Start: 2021-06-25 | End: 2022-03-01

## 2021-10-01 NOTE — PROGRESS NOTES
MTM[facility] Pharmacy Visit [facility]    Ashley Malone is a 61 y.o. female seen by Endocrinology and assessed by the [facility] Management [facility] Pharmacist at [facility].  This was an initial MTM visit for Ashley Malone.    Ashley Malone was introduced to the [facility] and her allergies, PMH, and medications were reviewed.       Past Medical History:   Diagnosis Date   • Awareness under anesthesia     X2   • Back pain    • Depression    • Dizziness    • Elevated blood sugar level     PT STATES THIS WAS AFTER RECEIVING CORTISONE INJECTIONS IN KNEES   • Frequent UTI     CURRENTLY   • GERD (gastroesophageal reflux disease)    • High cholesterol    • History of Clostridium difficile 2012   • History of TIAs     LAST ONE 2 YRS AGO   • Hypertension    • IBS (irritable bowel syndrome)    • Left ankle pain    • Leg pain, right    • On home oxygen therapy     2-3L NC AT NIGHT   • Pancreas disorder     PT TAKES VICTOZA INJ    • PVC's (premature ventricular contractions)    • Restless leg syndrome    • Type 2 diabetes mellitus (HCC)      Social History     Socioeconomic History   • Marital status:      Spouse name: Not on file   • Number of children: Not on file   • Years of education: Not on file   • Highest education level: Not on file   Tobacco Use   • Smoking status: Never Smoker   • Smokeless tobacco: Never Used   Substance and Sexual Activity   • Alcohol use: Yes     Comment: RARELY   • Drug use: No   • Sexual activity: Defer       Medication Allergies:  Cortisone, Augmentin [amoxicillin-pot clavulanate], and Buffered aspirin        Current Outpatient Medications:   •  amLODIPine (NORVASC) 10 MG tablet, TAKE 1 TABLET BY MOUTH EVERY DAY AT NIGHT, Disp: , Rfl:   •  Armodafinil (NUVIGIL PO), Take 250 mg by mouth every morning., Disp: , Rfl:   •  celecoxib (CeleBREX) 200 MG capsule, TAKE 1 CAPSULE BY MOUTH TWICE DAILY AS NEEDED FOR PAIN, Disp: ,  Rfl:   •  clobetasol (TEMOVATE) 0.05 % cream, Apply  topically to the appropriate area as directed., Disp: , Rfl:   •  clopidogrel (PLAVIX) 75 MG tablet, Take 75 mg by mouth daily., Disp: , Rfl:   •  Continuous Blood Gluc Sensor (FreeStyle Alex 14 Day Sensor) misc, 1 application Every 14 (Fourteen) Days., Disp: 2 each, Rfl: 5  •  Diclofenac Sodium (VOLTAREN) 1 % gel gel, APPLY 2-4 GRAMS TO THE AFFECTED AREA 1-2 TIMES PER DAY, Disp: , Rfl:   •  Dulaglutide (Trulicity) 3 MG/0.5ML solution pen-injector, Inject 3 mg under the skin into the appropriate area as directed 1 (One) Time Per Week., Disp: 6 mL, Rfl: 1  •  DULoxetine (CYMBALTA) 60 MG capsule, TAKE 1 CAPSULE BY MOUTH TWICE DAILY, Disp: , Rfl:   •  ergocalciferol (ERGOCALCIFEROL) 1.25 MG (89657 UT) capsule, Take 1 capsule by mouth 1 (One) Time Per Week., Disp: 12 capsule, Rfl: 1  •  fluconazole (Diflucan) 150 MG tablet, Take 1 tablet by mouth 1 (One) Time for 1 dose. Repeat in 10 days, Disp: 2 tablet, Rfl: 0  •  FORTEO 600 MCG/2.4ML injection, , Disp: , Rfl:   •  gabapentin (NEURONTIN) 100 MG capsule, Take 100 mg by mouth 3 (Three) Times a Day., Disp: , Rfl:   •  HYDROcodone-acetaminophen (NORCO)  MG per tablet, Take 1 tablet by mouth every 4 (four) hours as needed for moderate pain (4-6)., Disp: 1 tablet, Rfl: 0  •  Hyoscyamine Sulfate (LEVBID PO), Take 1.25 mg by mouth daily as needed., Disp: , Rfl:   •  Insulin Glargine (BASAGLAR KWIKPEN) 100 UNIT/ML injection pen, Inject 80 Units under the skin into the appropriate area as directed Every Morning AND 40 Units Every Evening., Disp: 105 mL, Rfl: 0  •  Insulin Pen Needle 32G X 4 MM misc, Use as directed twice daily, Disp: 100 each, Rfl: PRN  •  lisinopril (PRINIVIL,ZESTRIL) 40 MG tablet, Take 40 mg by mouth Daily., Disp: , Rfl:   •  metoprolol succinate XL (TOPROL-XL) 50 MG 24 hr tablet, Take 2 tablets by mouth Every Morning., Disp: , Rfl:   •  MONTELUKAST SODIUM PO, Take 10 mg by mouth every morning.,  Disp: , Rfl:   •  ondansetron ODT (ZOFRAN-ODT) 4 MG disintegrating tablet, Take 1 tablet by mouth 4 (Four) Times a Day As Needed for Nausea or Vomiting., Disp: 15 tablet, Rfl: 0  •  ONETOUCH DELICA LANCETS FINE misc, 1 each 3 (Three) Times a Day., Disp: 100 each, Rfl: 5  •  ONETOUCH VERIO test strip, Test 2-3 times daily Use as instructed, Disp: 100 each, Rfl: 5  •  rOPINIRole (REQUIP) 2 MG tablet, Take 4 mg by mouth At Night As Needed., Disp: , Rfl:   •  rosuvastatin (CRESTOR) 40 MG tablet, TAKE 1 TABLET BY MOUTH EVERY DAY, Disp: 90 tablet, Rfl: 3  •  SUMAtriptan (IMITREX) 100 MG tablet, Take 100 mg by mouth., Disp: , Rfl:   •  Synjardy XR 12.5-1000 MG tablet sustained-release 24 hour, TAKE 2 TABLETS BY MOUTH DAILY WITH BREAKFAST., Disp: 60 tablet, Rfl: 6  •  topiramate (TOPAMAX) 25 MG tablet, TAKE 1 TABLET BY MOUTH TWICE A DAY, Disp: , Rfl:   •  traZODone (DESYREL) 100 MG tablet, Take 300 mg by mouth Every Night., Disp: , Rfl:       Labs:  There were no vitals filed for this visit.  There were no vitals filed for this visit.  Lab Results   Component Value Date    HGBA1C 8.50 (H) 09/24/2021     Lab Results   Component Value Date    GLUCOSE 249 (H) 01/20/2020    CALCIUM 9.7 09/24/2021     09/24/2021    K 4.5 09/24/2021    CO2 29.1 (H) 09/24/2021    CL 98 09/24/2021    BUN 15 09/24/2021    CREATININE 0.80 09/24/2021    EGFRIFAFRI 89 09/24/2021    EGFRIFNONA 73 09/24/2021    BCR 18.8 09/24/2021    ANIONGAP 15.1 (H) 01/20/2020     Lab Results   Component Value Date    CHLPL 169 09/24/2021    TRIG 301 (H) 09/24/2021    HDL 56 09/24/2021    LDL 66 09/24/2021         Drug-Drug Interactions:   No significant DDIs were noted.      Medication Assessment:   1. Aspirin - Not Indicated   2. Statin - Currently Taking  3. ACEi/ARB - Currently Taking      Medication Education on Specialty Medication:  Synjardy XR  • Discussed the medication's MOA and that it may cause more frequent urination particularly upon starting the  medication  • Take one tablet in the morning with or without food    • Encouraged to drink plenty of water as to not get dehydrated especially in warmer weather or with activity  • Also discussed there may be an increased incidence of UTIs or yeast infections and to monitor for signs/symptoms  • Encouraged to take with food as it may cause N/V/D if taken on empty stomach    Trulicity  · Discussed the medication's MOA and that it helps you feel full, helps your body release more insulin and helps your body make less sugar after meals.  · Also discussed that nausea, vomiting, constipation and/or diarrhea tend to be the most common adverse effects, especially if larger meals are eaten.  Encouraged smaller meals throughout the day.  · Do not start the medication if you have h/o pancreatitis or thyroid cancer, and keep your early eye exams.  · Inject subcutaneously into the upper arm, thigh or abdomen.  Rotate injection site each week.  · Store in the refrigerator.  If needed, each prefilled pen can be kept at room temperature for up to 14 days.  · Take missed dose as soon as remember and if it is less than 3 days until the next dose, skip the missed dose and get back on track; do not take 2 doses or extra doses.        Assessment / Plan:  1. The patient was provided medication education via verbal information. Patient expressed understanding and had no further questions at this time.  2. Per APRN, increase Trulicity to 3 mg weekly, hold Synjardy for now given yeast infections (fluconazole prescribed)  3. Patient also endorsed that she would like to look into insurance coverage for CGM (FreeStyle Alex 2 and Dexcom) --> Looks like $30/month or $60/3 months for FSL2  4. Discussed the [facility] pharmacy services that are available to her, including monitoring of refills, prior authorizations, and copay coupon cards, as applicable, as well as curb-side pick-up or mail-order as needed.  o We will deliver these new Rx for  her.  She also needs pen needles.  5. Care Coordinator, Chrystal Christian CP, will assist with the transfer of prescriptions as applicable.  6. Contact information for the pharmacy team was provided to the patient.        Johanny Jennings PharmD  10/1/2021  14:00 EDT

## 2021-10-01 NOTE — TELEPHONE ENCOUNTER
Chrystal and I left voicemail x 2 to confirm delivery for her medications and copayment.  She specifically asked about fluconazole during her visit, so I will go ahead deliver that for tomorrow (Saturday 10/2/21) and the copay is $5.  I will connect with patient Monday morning to coordinate delivery of Trulicity, Basaglar, and pen needles (remaining copay = $44.88).    Johanny Jennings, PharmD  10/1/2021  14:28 EDT

## 2021-10-01 NOTE — PROGRESS NOTES
"Chief Complaint  Diabetes (testing bs 3 x day / last dm eye exam june 2021)    Subjective          Ashley Malone presents to [facility] ENDOCRINOLOGY  History of Present Illness     a1c worse, had steroid shot and epidural     occasional yeast infection VS UTI, some burning and itching currently       I have reviewed PMH, allergies and medications UTD at this visit      Type 2 dm    Diagnosed about 7 years ago.   Today in clinic pt reports being on synjardy XR 12.5-1000mg BID, basaglar 80u QAM, and 40u QPM ( was on 80u daily and has added the additional insulin with steroids), trulicity 1.5mg   FBG - 110  Pre meals - 150s  Checks BG - 3x/day  Dm retinopathy - X  ,Last eye exam - going again in 3 months   Dm nephropathy - X  Dm neuropathy - X ,Dm neuropathy meds - gabapentin   CAD - X  CVA - X  Episodes of hypoglycemia - X  Pt is less physically active. weight up 20 pounds from steroids  Pt tries to follow DM diet for most part.   On Ace inb. And statin      Review of Systems   Constitutional: Positive for unexpected weight change. Negative for fever.   Genitourinary: Positive for dysuria. Negative for difficulty urinating and flank pain.   Psychiatric/Behavioral: Negative for confusion.         Objective   Vital Signs:   /48 (BP Location: Right arm, Patient Position: Sitting, Cuff Size: Large Adult)   Pulse 76   Ht 172.7 cm (67.99\")   Wt 132 kg (290 lb 12.8 oz)   SpO2 95%   BMI 44.23 kg/m²     Physical Exam  Vitals reviewed.   Constitutional:       General: She is not in acute distress.  HENT:      Head: Normocephalic and atraumatic.   Cardiovascular:      Rate and Rhythm: Normal rate and regular rhythm.   Pulmonary:      Effort: Pulmonary effort is normal. No respiratory distress.   Musculoskeletal:         General: No signs of injury. Normal range of motion.      Cervical back: Normal range of motion and neck supple.   Skin:     General: Skin is warm and dry.   Neurological:      " Mental Status: She is alert and oriented to person, place, and time. Mental status is at baseline.      Motor: Weakness present.      Gait: Gait abnormal.      Comments: Cane from old femur fracture   Psychiatric:         Mood and Affect: Mood normal.         Behavior: Behavior normal.         Thought Content: Thought content normal.         Judgment: Judgment normal.        Result Review :   The following data was reviewed by: SAVANNAH Mosqueda on 10/01/2021:  Common labs    Common Labsle 4/8/21 4/8/21 4/8/21 8/17/21 8/17/21 9/24/21 9/24/21 9/24/21    1456 1456 1456 1623 1623 0950 0950 0950   Glucose 211 (A)   142 (A)   210 (A)    BUN 20   18   15    Creatinine 0.93   0.9   0.80    eGFR Non African Am 61      73    eGFR  Am 75      89    Sodium 138   136 (A)   138    Potassium 4.4   5.1   4.5    Chloride 98   94 (A)   98    Calcium 9.4   10.3 (A)   9.7    Total Protein 6.9      6.8    Albumin 4.30   4.8   4.30    Total Bilirubin <0.2   0.5   <0.2    Alkaline Phosphatase 95   101   102    AST (SGOT) 12   24   11    ALT (SGPT) 11   11 (A)   8    Total Cholesterol   231 (A)     169   Triglycerides   262 (A)     301 (A)   HDL Cholesterol   78 (A)     56   LDL Cholesterol    109 (A)     66   Hemoglobin A1C  8.00 (A)   7.6 (A) 8.50 (A)     (A) Abnormal value       Comments are available for some flowsheets but are not being displayed.                     Assessment and Plan    Diagnoses and all orders for this visit:    1. Type 2 diabetes mellitus with hyperglycemia, with long-term current use of insulin (HCC) (Primary)    2. Dysuria    3. High cholesterol    Other orders  -     Insulin Glargine (BASAGLAR KWIKPEN) 100 UNIT/ML injection pen; Inject 100 Units under the skin into the appropriate area as directed Daily for 90 days.  Dispense: 90 mL; Refill: 1  -     Urinalysis With Culture If Indicated -  -     fluconazole (Diflucan) 150 MG tablet; Take 1 tablet by mouth 1 (One) Time for 1 dose. Repeat in 10 days   Dispense: 2 tablet; Refill: 0  -     Dulaglutide (Trulicity) 3 MG/0.5ML solution pen-injector; Inject 3 mg under the skin into the appropriate area as directed 1 (One) Time Per Week.  Dispense: 12 pen; Refill: 1        Follow Up   Return in about 2 months (around 12/1/2021).     HOLD  synjardy if yeast infection ongoing, diflucan x 1 repeat in 10 days    Wants to check UA to R/O bacterial UTI  Continue with basaglar increase, fasting BS acceptable  Increase trulicity to 3mg weekly   Work on weight loss and diabetic diet     Patient was given instructions and counseling regarding her condition or for health maintenance advice. Please see specific information pulled into the AVS if appropriate.     Mayda Royal APRN

## 2021-10-04 ENCOUNTER — TELEPHONE (OUTPATIENT)
Dept: ENDOCRINOLOGY | Age: 61
End: 2021-10-04

## 2021-10-04 LAB
APPEARANCE UR: CLEAR
BACTERIA #/AREA URNS HPF: ABNORMAL /HPF
BACTERIA UR CULT: ABNORMAL
BACTERIA UR CULT: ABNORMAL
BILIRUB UR QL STRIP: NEGATIVE
CASTS URNS QL MICRO: ABNORMAL /LPF
COLOR UR: YELLOW
EPI CELLS #/AREA URNS HPF: ABNORMAL /HPF (ref 0–10)
GLUCOSE UR QL: ABNORMAL
HGB UR QL STRIP: NEGATIVE
KETONES UR QL STRIP: NEGATIVE
LEUKOCYTE ESTERASE UR QL STRIP: NEGATIVE
MICRO URNS: ABNORMAL
MICRO URNS: ABNORMAL
NITRITE UR QL STRIP: NEGATIVE
OTHER ANTIBIOTIC SUSC ISLT: ABNORMAL
PH UR STRIP: 5.5 [PH] (ref 5–7.5)
PROT UR QL STRIP: NEGATIVE
RBC #/AREA URNS HPF: ABNORMAL /HPF (ref 0–2)
SP GR UR: >=1.03 (ref 1–1.03)
URINALYSIS REFLEX: ABNORMAL
UROBILINOGEN UR STRIP-MCNC: 0.2 MG/DL (ref 0.2–1)
WBC #/AREA URNS HPF: ABNORMAL /HPF (ref 0–5)

## 2021-10-05 ENCOUNTER — TELEPHONE (OUTPATIENT)
Dept: ENDOCRINOLOGY | Age: 61
End: 2021-10-05

## 2021-11-18 ENCOUNTER — SPECIALTY PHARMACY (OUTPATIENT)
Dept: ENDOCRINOLOGY | Age: 61
End: 2021-11-18

## 2021-11-18 ENCOUNTER — TELEPHONE (OUTPATIENT)
Dept: ONCOLOGY | Facility: CLINIC | Age: 61
End: 2021-11-18

## 2021-11-18 NOTE — PROGRESS NOTES
Specialty Pharmacy Refill Coordination Note     Ashley is a 61 y.o. female contacted today regarding refills of  2 specialty medication(s).    Reviewed and verified with patient:      Specialty medication(s) and dose(s) confirmed: yes    Refill Questions      Most Recent Value   Changes to allergies? No   Changes to medications? No   New conditions since last clinic visit No   Unplanned office visit, urgent care, ED, or hospital admission in the last 4 weeks  No   How does patient/caregiver feel medication is working? Very good   Financial problems or insurance changes  No          Delivery Questions      Most Recent Value   Delivery method [employer]   Delivery address correct? Yes   Preferred delivery time? Anytime   Number of medications in delivery 2   Medication being filled and delivered Basaglar $5, trulicity 12/12/21   Is there any medication that is due not being filled? No   Supplies needed? No supplies needed   Cooler needed? Yes   Do any medications need mixed or dated? No   Copay form of payment Credit card on file   Questions or concerns for the pharmacist? No   Are any medications first time fills? No   Shipment status Cooler packed            Medication Adherence    Any gaps in refill history greater than 2 weeks in the last 3 months: no  Demonstrates understanding of importance of adherence: yes  Informant: patient  Reliability of informant: reliable  Provider-estimated medication adherence level: %  Reasons for non-adherence: no problems identified  Adherence tools used: alarm  Support network for adherence: family member          Follow-up: 1 day(s)     TOM MORRISON  Specialty Pharmacy Technician

## 2021-11-18 NOTE — TELEPHONE ENCOUNTER
PATIENT SAW DR. AREVALO OVER TEN YEARS AGO & NEEDS TO BE SEEN AGAIN, LET PATIENT KNOW THAT SINCE IT HAS BEEN OVER TEN YEARS AGO, WE WOULD NEED A NEW REF. FROM HER PCP, PATIENT V/U & WILL CALL HER PCP TO GET REF.

## 2021-11-22 ENCOUNTER — SPECIALTY PHARMACY (OUTPATIENT)
Dept: ENDOCRINOLOGY | Age: 61
End: 2021-11-22

## 2022-02-15 ENCOUNTER — TELEPHONE (OUTPATIENT)
Dept: ENDOCRINOLOGY | Age: 62
End: 2022-02-15

## 2022-02-15 ENCOUNTER — SPECIALTY PHARMACY (OUTPATIENT)
Dept: ENDOCRINOLOGY | Age: 62
End: 2022-02-15

## 2022-02-15 NOTE — PROGRESS NOTES
Have left several messages for Ashley about prescriptions     basaglar $25  trulicity $25  unifine $53.35  Canceling prescriptions at the pharmacy until further notice

## 2022-02-16 ENCOUNTER — TELEPHONE (OUTPATIENT)
Dept: ENDOCRINOLOGY | Age: 62
End: 2022-02-16

## 2022-02-16 ENCOUNTER — SPECIALTY PHARMACY (OUTPATIENT)
Dept: ENDOCRINOLOGY | Age: 62
End: 2022-02-16

## 2022-02-17 ENCOUNTER — TELEPHONE (OUTPATIENT)
Dept: ENDOCRINOLOGY | Age: 62
End: 2022-02-17

## 2022-02-18 RX ORDER — ERGOCALCIFEROL 1.25 MG/1
50000 CAPSULE ORAL WEEKLY
Qty: 12 CAPSULE | Refills: 1 | Status: SHIPPED | OUTPATIENT
Start: 2022-02-18 | End: 2022-02-20 | Stop reason: SDUPTHER

## 2022-02-21 ENCOUNTER — APPOINTMENT (OUTPATIENT)
Dept: LAB | Facility: HOSPITAL | Age: 62
End: 2022-02-21

## 2022-02-21 RX ORDER — ERGOCALCIFEROL 1.25 MG/1
50000 CAPSULE ORAL WEEKLY
Qty: 12 CAPSULE | Refills: 1 | Status: SHIPPED | OUTPATIENT
Start: 2022-02-21 | End: 2022-10-12

## 2022-02-24 ENCOUNTER — DOCUMENTATION (OUTPATIENT)
Dept: ENDOCRINOLOGY | Age: 62
End: 2022-02-24

## 2022-02-24 NOTE — PROGRESS NOTES
Benefits Investigation Summary    Prescription: Renewal     Dispensing pharmacy:  [facility]    Copay amount:  TRULICITY $25   SYNJARDY $60 AT [facility]    PLAN: [facility]  BIN: [id number]  PCN: [id number]  RX GROUP:    Prior Auth and Med Assistance notes:

## 2022-03-01 ENCOUNTER — LAB (OUTPATIENT)
Dept: LAB | Facility: HOSPITAL | Age: 62
End: 2022-03-01

## 2022-03-01 ENCOUNTER — CONSULT (OUTPATIENT)
Dept: ONCOLOGY | Facility: CLINIC | Age: 62
End: 2022-03-01

## 2022-03-01 VITALS
HEART RATE: 111 BPM | SYSTOLIC BLOOD PRESSURE: 159 MMHG | HEIGHT: 67 IN | WEIGHT: 272.3 LBS | RESPIRATION RATE: 16 BRPM | OXYGEN SATURATION: 90 % | DIASTOLIC BLOOD PRESSURE: 101 MMHG | TEMPERATURE: 97.1 F | BODY MASS INDEX: 42.74 KG/M2

## 2022-03-01 DIAGNOSIS — E61.1 IRON DEFICIENCY: ICD-10-CM

## 2022-03-01 DIAGNOSIS — R53.83 OTHER FATIGUE: ICD-10-CM

## 2022-03-01 DIAGNOSIS — E61.1 IRON DEFICIENCY: Primary | ICD-10-CM

## 2022-03-01 DIAGNOSIS — J32.9 CHRONIC SINUSITIS, UNSPECIFIED LOCATION: ICD-10-CM

## 2022-03-01 DIAGNOSIS — R79.89 ABNORMAL CBC: Primary | ICD-10-CM

## 2022-03-01 LAB
BASOPHILS # BLD AUTO: 0.06 10*3/MM3 (ref 0–0.2)
BASOPHILS NFR BLD AUTO: 0.5 % (ref 0–1.5)
CRP SERPL-MCNC: 1.75 MG/DL (ref 0–0.5)
DEPRECATED RDW RBC AUTO: 40.1 FL (ref 37–54)
EOSINOPHIL # BLD AUTO: 0.24 10*3/MM3 (ref 0–0.4)
EOSINOPHIL NFR BLD AUTO: 2.1 % (ref 0.3–6.2)
ERYTHROCYTE [DISTWIDTH] IN BLOOD BY AUTOMATED COUNT: 12.8 % (ref 12.3–15.4)
ERYTHROCYTE [SEDIMENTATION RATE] IN BLOOD: 50 MM/HR (ref 0–30)
FERRITIN SERPL-MCNC: 104 NG/ML (ref 13–150)
HCT VFR BLD AUTO: 43.7 % (ref 34–46.6)
HGB BLD-MCNC: 14.2 G/DL (ref 12–15.9)
HGB RETIC QN AUTO: 32.6 PG (ref 29.8–36.1)
IMM GRANULOCYTES # BLD AUTO: 0.04 10*3/MM3 (ref 0–0.05)
IMM GRANULOCYTES NFR BLD AUTO: 0.4 % (ref 0–0.5)
IMM RETICS NFR: 17.4 % (ref 3–15.8)
IRON 24H UR-MRATE: 49 MCG/DL (ref 37–145)
IRON SATN MFR SERPL: 13 % (ref 14–48)
LYMPHOCYTES # BLD AUTO: 2.94 10*3/MM3 (ref 0.7–3.1)
LYMPHOCYTES NFR BLD AUTO: 26.3 % (ref 19.6–45.3)
MCH RBC QN AUTO: 28.1 PG (ref 26.6–33)
MCHC RBC AUTO-ENTMCNC: 32.5 G/DL (ref 31.5–35.7)
MCV RBC AUTO: 86.5 FL (ref 79–97)
MONOCYTES # BLD AUTO: 0.74 10*3/MM3 (ref 0.1–0.9)
MONOCYTES NFR BLD AUTO: 6.6 % (ref 5–12)
NEUTROPHILS NFR BLD AUTO: 64.1 % (ref 42.7–76)
NEUTROPHILS NFR BLD AUTO: 7.16 10*3/MM3 (ref 1.7–7)
NRBC BLD AUTO-RTO: 0 /100 WBC (ref 0–0.2)
PLATELET # BLD AUTO: 327 10*3/MM3 (ref 140–450)
PMV BLD AUTO: 10.4 FL (ref 6–12)
RBC # BLD AUTO: 5.05 10*6/MM3 (ref 3.77–5.28)
RETICS # AUTO: 0.1 10*6/MM3 (ref 0.02–0.13)
RETICS/RBC NFR AUTO: 2.02 % (ref 0.7–1.9)
T4 FREE SERPL-MCNC: 1.18 NG/DL (ref 0.93–1.7)
TIBC SERPL-MCNC: 388 MCG/DL (ref 249–505)
TRANSFERRIN SERPL-MCNC: 277 MG/DL (ref 200–360)
TSH SERPL DL<=0.05 MIU/L-ACNC: 0.91 UIU/ML (ref 0.27–4.2)
VIT B12 BLD-MCNC: 306 PG/ML (ref 211–946)
WBC NRBC COR # BLD: 11.18 10*3/MM3 (ref 3.4–10.8)

## 2022-03-01 PROCEDURE — 85046 RETICYTE/HGB CONCENTRATE: CPT | Performed by: INTERNAL MEDICINE

## 2022-03-01 PROCEDURE — 84466 ASSAY OF TRANSFERRIN: CPT | Performed by: INTERNAL MEDICINE

## 2022-03-01 PROCEDURE — 85025 COMPLETE CBC W/AUTO DIFF WBC: CPT

## 2022-03-01 PROCEDURE — 36415 COLL VENOUS BLD VENIPUNCTURE: CPT

## 2022-03-01 PROCEDURE — 85652 RBC SED RATE AUTOMATED: CPT | Performed by: INTERNAL MEDICINE

## 2022-03-01 PROCEDURE — 82607 VITAMIN B-12: CPT | Performed by: INTERNAL MEDICINE

## 2022-03-01 PROCEDURE — 83540 ASSAY OF IRON: CPT | Performed by: INTERNAL MEDICINE

## 2022-03-01 PROCEDURE — 81270 JAK2 GENE: CPT

## 2022-03-01 PROCEDURE — 84443 ASSAY THYROID STIM HORMONE: CPT | Performed by: INTERNAL MEDICINE

## 2022-03-01 PROCEDURE — 82728 ASSAY OF FERRITIN: CPT | Performed by: INTERNAL MEDICINE

## 2022-03-01 PROCEDURE — 99204 OFFICE O/P NEW MOD 45 MIN: CPT | Performed by: INTERNAL MEDICINE

## 2022-03-01 PROCEDURE — 86140 C-REACTIVE PROTEIN: CPT | Performed by: INTERNAL MEDICINE

## 2022-03-01 PROCEDURE — 84439 ASSAY OF FREE THYROXINE: CPT | Performed by: INTERNAL MEDICINE

## 2022-03-01 RX ORDER — OMEPRAZOLE 10 MG/1
10 CAPSULE, DELAYED RELEASE ORAL DAILY
COMMUNITY

## 2022-03-01 RX ORDER — METOPROLOL SUCCINATE 100 MG/1
TABLET, EXTENDED RELEASE ORAL
COMMUNITY
Start: 2021-11-29

## 2022-03-01 RX ORDER — ALBUTEROL SULFATE 90 UG/1
2 AEROSOL, METERED RESPIRATORY (INHALATION)
COMMUNITY
Start: 2021-12-09

## 2022-03-01 RX ORDER — FUROSEMIDE 20 MG/1
1 TABLET ORAL DAILY PRN
COMMUNITY
Start: 2022-02-10 | End: 2023-02-10

## 2022-03-01 NOTE — PROGRESS NOTES
Subjective     REASON FOR CONSULTATION:    · History of iron deficiency anemia recurrent s/p IV Feraheme in the past  · Leukocytosis chronic  Provide an opinion on any further workup or treatment                             REQUESTING PHYSICIAN:      RECORDS OBTAINED:  Records of the patients history including those obtained from the referring provider were reviewed and summarized in detail.    HISTORY OF PRESENT ILLNESS:  The patient is a 61 y.o. year old female who is here for an opinion about the above issue.    History of Present Illness   Patient is a 61-year-old female who has seen me in the past about 10 years ago with iron deficiency anemia and apparently received Feraheme infusions at that time.  Currently she is feeling fatigued and also has chronic leukocytosis and is here for evaluation.  She does have sinus infections and bronchitis for which she takes intermittent antibiotics.  She had to have drainage of her sinuses once.  She does complain of abdominal discomfort with fullness of the left upper quadrant.  Certainly we will need to evaluate her iron and B12 studies given her fatigue and likely  check her thyroid functions.    Patient has not lost weight.  She has got a reasonably good appetite.  She has chronic back pain and has had surgery in the low back many years ago.    Patient's past medical history is consistent with a TIA x2, hypertension,    Patient does smoke but not significant.  We have encouraged her to quit smoking    Past Medical History:   Diagnosis Date   • Anxiety    • Awareness under anesthesia     X2   • Back pain    • Bulging lumbar disc    • Clostridium difficile colitis 2005   • DDD (degenerative disc disease), lumbar    • Depression    • Dizziness    • Elevated blood sugar level     PT STATES THIS WAS AFTER RECEIVING CORTISONE INJECTIONS IN KNEES   • Frequent UTI     CURRENTLY   • GERD (gastroesophageal reflux disease)    • High cholesterol    • History of anemia    • History  of blood transfusion 2016   • History of Clostridium difficile 2012   • History of snoring    • History of TIAs     LAST ONE 2 YRS AGO   • Hypertension    • IBS (irritable bowel syndrome)    • Left ankle pain    • Leg pain, right    • On home oxygen therapy     2-3L NC AT NIGHT   • HECTOR (obstructive sleep apnea)    • Pancreas disorder     PT TAKES VICTOZA INJ    • PVC's (premature ventricular contractions)    • Restless leg syndrome    • Type 2 diabetes mellitus (HCC)         Past Surgical History:   Procedure Laterality Date   • ANKLE SURGERY Right    • BACK SURGERY     • FEMUR FRACTURE SURGERY     • HYSTERECTOMY     • KNEE ARTHROPLASTY Right    • NASAL SINUS SURGERY      X3   • AK REVISE KNEE JOINT REPLACE,1 PART Right 8/15/2016    Procedure: RT TOTAL KNEE ARTHROPLASTY REVISION;  Surgeon: Alcides Ramirez MD;  Location: [facility] OR;  Service: Orthopedics        Current Outpatient Medications on File Prior to Visit   Medication Sig Dispense Refill   • albuterol sulfate  (90 Base) MCG/ACT inhaler Inhale 2 puffs.     • amLODIPine (NORVASC) 10 MG tablet TAKE 1 TABLET BY MOUTH EVERY DAY AT NIGHT     • Armodafinil (NUVIGIL PO) Take 250 mg by mouth every morning.     • celecoxib (CeleBREX) 200 MG capsule TAKE 1 CAPSULE BY MOUTH TWICE DAILY AS NEEDED FOR PAIN     • clopidogrel (PLAVIX) 75 MG tablet Take 75 mg by mouth daily.     • Continuous Blood Gluc Sensor (AutoNaviStyle Alex 14 Day Sensor) misc 1 application Every 14 (Fourteen) Days. 2 each 5   • Diclofenac Sodium (VOLTAREN) 1 % gel gel APPLY 2-4 GRAMS TO THE AFFECTED AREA 1-2 TIMES PER DAY     • Dulaglutide (Trulicity) 3 MG/0.5ML solution pen-injector Inject 3 mg under the skin into the appropriate area as directed 1 (One) Time Per Week. 6 mL 1   • DULoxetine (CYMBALTA) 60 MG capsule TAKE 1 CAPSULE BY MOUTH TWICE DAILY     • furosemide (LASIX) 20 MG tablet Take 1 tablet by mouth Daily As Needed.     • gabapentin (NEURONTIN) 100 MG capsule Take 100 mg by mouth 3  (Three) Times a Day.     • HYDROcodone-acetaminophen (NORCO)  MG per tablet Take 1 tablet by mouth every 4 (four) hours as needed for moderate pain (4-6). 1 tablet 0   • Hyoscyamine Sulfate (LEVBID PO) Take 1.25 mg by mouth daily as needed.     • Insulin Glargine (BASAGLAR KWIKPEN) 100 UNIT/ML injection pen Inject 80 Units under the skin into the appropriate area as directed Every Morning AND 40 Units Every Evening. 105 mL 0   • Insulin Pen Needle 32G X 4 MM misc Use as directed twice daily 100 each PRN   • lisinopril (PRINIVIL,ZESTRIL) 40 MG tablet Take 40 mg by mouth Daily.     • metoprolol succinate XL (TOPROL-XL) 100 MG 24 hr tablet TAKE 2 TABLETS BY MOUTH EVERY DAY AT NIGHT     • MONTELUKAST SODIUM PO Take 10 mg by mouth every morning.     • omeprazole (prilOSEC) 10 MG capsule Take 10 mg by mouth Daily.     • ondansetron ODT (ZOFRAN-ODT) 4 MG disintegrating tablet Take 1 tablet by mouth 4 (Four) Times a Day As Needed for Nausea or Vomiting. 15 tablet 0   • ONETOUCH DELICA LANCETS FINE misc 1 each 3 (Three) Times a Day. 100 each 5   • ONETOUCH VERIO test strip Test 2-3 times daily Use as instructed 100 each 5   • rOPINIRole (REQUIP) 2 MG tablet Take 4 mg by mouth At Night As Needed.     • rosuvastatin (CRESTOR) 40 MG tablet TAKE 1 TABLET BY MOUTH EVERY DAY 90 tablet 3   • Synjardy XR 12.5-1000 MG tablet sustained-release 24 hour TAKE 2 TABLETS BY MOUTH DAILY WITH BREAKFAST. 60 tablet 6   • traZODone (DESYREL) 100 MG tablet Take 300 mg by mouth Every Night.     • vitamin D (ERGOCALCIFEROL) 1.25 MG (88840 UT) capsule capsule Take 1 capsule by mouth 1 (One) Time Per Week. 12 capsule 1   • [DISCONTINUED] clobetasol (TEMOVATE) 0.05 % cream Apply  topically to the appropriate area as directed.     • [DISCONTINUED] FORTEO 600 MCG/2.4ML injection      • [DISCONTINUED] metoprolol succinate XL (TOPROL-XL) 50 MG 24 hr tablet Take 2 tablets by mouth Every Morning.     • [DISCONTINUED] SUMAtriptan (IMITREX) 100 MG  tablet Take 100 mg by mouth.     • [DISCONTINUED] topiramate (TOPAMAX) 25 MG tablet TAKE 1 TABLET BY MOUTH TWICE A DAY       No current facility-administered medications on file prior to visit.        ALLERGIES:    Allergies   Allergen Reactions   • Cortisone Other (See Comments)     TIA-POST INJECTION- KNEES   • Aspirin Buf(Cacarb-Mgcarb-Mgo) Unknown (See Comments)     Nose bleeds   • Augmentin [Amoxicillin-Pot Clavulanate] GI Intolerance        Social History     Socioeconomic History   • Marital status:      Spouse name: Jose   • Number of children: 1   Tobacco Use   • Smoking status: Never Smoker   • Smokeless tobacco: Never Used   Substance and Sexual Activity   • Alcohol use: Yes     Comment: RARELY   • Drug use: No   • Sexual activity: Defer        Family History   Problem Relation Age of Onset   • Heart disease Father    • Heart attack Father    • Asthma Brother    • Colon cancer Paternal Grandfather         Review of Systems   Constitutional: Negative for appetite change, chills, diaphoresis, fatigue, fever and unexpected weight change.   HENT: Negative for hearing loss, sore throat and trouble swallowing.    Respiratory: Negative for cough, chest tightness, shortness of breath and wheezing.    Cardiovascular: Negative for chest pain, palpitations and leg swelling.   Gastrointestinal: Negative for abdominal distention, abdominal pain, constipation, diarrhea, nausea and vomiting.   Genitourinary: Negative for dysuria, frequency, hematuria and urgency.   Musculoskeletal: Negative for joint swelling.        No muscle weakness.   Skin: Negative for rash and wound.   Neurological: Negative for seizures, syncope, speech difficulty, weakness, numbness and headaches.   Hematological: Negative for adenopathy. Does not bruise/bleed easily.   Psychiatric/Behavioral: Negative for behavioral problems, confusion and suicidal ideas.   All other systems reviewed and are negative.       Objective     Vitals:     "03/01/22 1521   BP: (!) 159/101   Pulse: 111   Resp: 16   Temp: 97.1 °F (36.2 °C)   TempSrc: Temporal   SpO2: 90%   Weight: 124 kg (272 lb 4.8 oz)   Height: 170.5 cm (67.13\")  Comment: New Ht   PainSc:   7   PainLoc: Leg  Comment: Right     Current Status 3/1/2022   ECOG score 1       Physical Exam    Patient screened positive for depression based on a PHQ-9 score of 5 on 3/1/2022. Follow-up recommendations include: PCP managing depression    CONSTITUTIONAL:  Vital signs reviewed.  No distress, looks comfortable.  EYES:  Conjunctivae and lids unremarkable.  PERRLA  EARS,NOSE,MOUTH,THROAT:  Ears and nose appear unremarkable.  Lips, teeth, gums appear unremarkable.  RESPIRATORY:  Normal respiratory effort.  Lungs clear to auscultation bilaterally.  CARDIOVASCULAR:  Normal S1, S2.  No murmurs rubs or gallops.  No significant lower extremity edema.  GASTROINTESTINAL: Abdomen appears unremarkable.  Nontender.  No hepatomegaly.  No splenomegaly.  LYMPHATIC:  No cervical, supraclavicular, axillary lymphadenopathy.  SKIN:  Warm.  No rashes.  PSYCHIATRIC:  Normal judgment and insight.  Normal mood and affect.    RECENT LABS:  Hematology WBC   Date Value Ref Range Status   03/01/2022 11.18 (H) 3.40 - 10.80 10*3/mm3 Final   08/17/2020 12.34 (H) 4.5 - 11.0 10*3/uL Final     RBC   Date Value Ref Range Status   03/01/2022 5.05 3.77 - 5.28 10*6/mm3 Final   08/17/2020 4.78 4.0 - 5.2 10*6/uL Final     Hemoglobin   Date Value Ref Range Status   03/01/2022 14.2 12.0 - 15.9 g/dL Final   08/17/2020 13.2 12.0 - 16.0 g/dL Final     Hematocrit   Date Value Ref Range Status   03/01/2022 43.7 34.0 - 46.6 % Final   08/17/2020 43.0 36.0 - 46.0 % Final     Platelets   Date Value Ref Range Status   03/01/2022 327 140 - 450 10*3/mm3 Final   08/17/2020 326 140 - 440 10*3/uL Final          Assessment/Plan     *History of recurrent iron deficiency anemia treated with Feraheme in the past  · Patient now complaining of significant fatigue even though " her hemoglobin appears normal  · Patient denies active GI bleeding  · She did have a colonoscopy in the past and that seemed to be negative    *Leukocytosis, chronic  · Reviewing the records she has had intermittent leukocytosis anywhere from 11-13  · She also complained of abdominal tenderness and discomfort  · She will require complete evaluation with CT scan of the abdomen pelvis.  We will likely obtain C-reactive protein, ESR and plain x-rays of the CT of the sinuses and CT scan of the abdomen and pelvis    Plan  · Obtain iron studies, B12 and RBC folate  · We will check rheumatoid factor and ESR C-reactive protein   · Check JAKOB, TSH  · Obtain CT of the abdomen pelvis and CT of the sinuses  · Check JAK2 mutation and BCR is able mutation  · We will       I spent 40 total minutes, face-to-face, caring for Ashley today.  Greater than 50% of this time involved counseling and/or coordination of care as documented within this note.    Debby Avilez MD

## 2022-03-02 LAB
FOLATE BLD-MCNC: 339 NG/ML
FOLATE RBC-MCNC: 787 NG/ML
HCT VFR BLD AUTO: 43.1 % (ref 34–46.6)

## 2022-03-08 ENCOUNTER — SPECIALTY PHARMACY (OUTPATIENT)
Dept: ENDOCRINOLOGY | Age: 62
End: 2022-03-08

## 2022-03-08 LAB — REF LAB TEST METHOD: NORMAL

## 2022-03-08 NOTE — PROGRESS NOTES
Tried contacting patient multiple times and never returned calls.  Refills canceled for now per insurance compliance.    Johanny Jennings, PharmANA  3/8/2022  14:12 EST

## 2022-03-16 ENCOUNTER — HOSPITAL ENCOUNTER (OUTPATIENT)
Dept: CT IMAGING | Facility: HOSPITAL | Age: 62
Discharge: HOME | End: 2022-03-16
Admitting: INTERNAL MEDICINE

## 2022-03-16 DIAGNOSIS — R53.83 OTHER FATIGUE: ICD-10-CM

## 2022-03-16 DIAGNOSIS — J32.9 CHRONIC SINUSITIS, UNSPECIFIED LOCATION: ICD-10-CM

## 2022-03-16 DIAGNOSIS — E61.1 IRON DEFICIENCY: ICD-10-CM

## 2022-03-16 LAB — CREAT BLDA-MCNC: 0.7 MG/DL (ref 0.6–1.3)

## 2022-03-16 PROCEDURE — 82565 ASSAY OF CREATININE: CPT

## 2022-03-16 PROCEDURE — 70486 CT MAXILLOFACIAL W/O DYE: CPT

## 2022-03-16 PROCEDURE — 25010000002 IOPAMIDOL 61 % SOLUTION: Performed by: INTERNAL MEDICINE

## 2022-03-16 PROCEDURE — 0 DIATRIZOATE MEGLUMINE & SODIUM PER 1 ML: Performed by: INTERNAL MEDICINE

## 2022-03-16 PROCEDURE — 74177 CT ABD & PELVIS W/CONTRAST: CPT

## 2022-03-16 RX ADMIN — IOPAMIDOL 85 ML: 612 INJECTION, SOLUTION INTRAVENOUS at 14:27

## 2022-03-16 RX ADMIN — DIATRIZOATE MEGLUMINE AND DIATRIZOATE SODIUM 30 ML: 660; 100 LIQUID ORAL; RECTAL at 13:15

## 2022-03-24 ENCOUNTER — OFFICE VISIT (OUTPATIENT)
Dept: ONCOLOGY | Facility: CLINIC | Age: 62
End: 2022-03-24

## 2022-03-24 ENCOUNTER — LAB (OUTPATIENT)
Dept: LAB | Facility: HOSPITAL | Age: 62
End: 2022-03-24

## 2022-03-24 VITALS
DIASTOLIC BLOOD PRESSURE: 99 MMHG | BODY MASS INDEX: 42.9 KG/M2 | OXYGEN SATURATION: 96 % | SYSTOLIC BLOOD PRESSURE: 173 MMHG | HEART RATE: 81 BPM | RESPIRATION RATE: 18 BRPM | WEIGHT: 273.3 LBS | TEMPERATURE: 96.8 F | HEIGHT: 67 IN

## 2022-03-24 DIAGNOSIS — R53.83 OTHER FATIGUE: ICD-10-CM

## 2022-03-24 DIAGNOSIS — E61.1 IRON DEFICIENCY: ICD-10-CM

## 2022-03-24 DIAGNOSIS — J32.9 CHRONIC SINUSITIS, UNSPECIFIED LOCATION: ICD-10-CM

## 2022-03-24 DIAGNOSIS — E61.1 IRON DEFICIENCY: Primary | ICD-10-CM

## 2022-03-24 PROBLEM — T45.4X5A ADVERSE EFFECT OF IRON: Status: ACTIVE | Noted: 2022-03-24

## 2022-03-24 LAB
ALBUMIN SERPL-MCNC: 4.1 G/DL (ref 3.5–5.2)
ALBUMIN/GLOB SERPL: 1.3 G/DL (ref 1.1–2.4)
ALP SERPL-CCNC: 92 U/L (ref 38–116)
ALT SERPL W P-5'-P-CCNC: 13 U/L (ref 0–33)
ANION GAP SERPL CALCULATED.3IONS-SCNC: 9.1 MMOL/L (ref 5–15)
AST SERPL-CCNC: 15 U/L (ref 0–32)
BASOPHILS # BLD AUTO: 0.05 10*3/MM3 (ref 0–0.2)
BASOPHILS NFR BLD AUTO: 0.5 % (ref 0–1.5)
BILIRUB SERPL-MCNC: 0.3 MG/DL (ref 0.2–1.2)
BUN SERPL-MCNC: 9 MG/DL (ref 6–20)
BUN/CREAT SERPL: 14.3 (ref 7.3–30)
CALCIUM SPEC-SCNC: 9.2 MG/DL (ref 8.5–10.2)
CHLORIDE SERPL-SCNC: 97 MMOL/L (ref 98–107)
CO2 SERPL-SCNC: 31.9 MMOL/L (ref 22–29)
CREAT SERPL-MCNC: 0.63 MG/DL (ref 0.6–1.1)
DEPRECATED RDW RBC AUTO: 41.5 FL (ref 37–54)
EGFRCR SERPLBLD CKD-EPI 2021: 101.1 ML/MIN/1.73
EOSINOPHIL # BLD AUTO: 0.23 10*3/MM3 (ref 0–0.4)
EOSINOPHIL NFR BLD AUTO: 2.3 % (ref 0.3–6.2)
ERYTHROCYTE [DISTWIDTH] IN BLOOD BY AUTOMATED COUNT: 13 % (ref 12.3–15.4)
GLOBULIN UR ELPH-MCNC: 3.1 GM/DL (ref 1.8–3.5)
GLUCOSE SERPL-MCNC: 213 MG/DL (ref 74–124)
HCT VFR BLD AUTO: 39.7 % (ref 34–46.6)
HGB BLD-MCNC: 12.6 G/DL (ref 12–15.9)
IMM GRANULOCYTES # BLD AUTO: 0.04 10*3/MM3 (ref 0–0.05)
IMM GRANULOCYTES NFR BLD AUTO: 0.4 % (ref 0–0.5)
LYMPHOCYTES # BLD AUTO: 3.39 10*3/MM3 (ref 0.7–3.1)
LYMPHOCYTES NFR BLD AUTO: 34.6 % (ref 19.6–45.3)
MCH RBC QN AUTO: 27.9 PG (ref 26.6–33)
MCHC RBC AUTO-ENTMCNC: 31.7 G/DL (ref 31.5–35.7)
MCV RBC AUTO: 87.8 FL (ref 79–97)
MONOCYTES # BLD AUTO: 0.67 10*3/MM3 (ref 0.1–0.9)
MONOCYTES NFR BLD AUTO: 6.8 % (ref 5–12)
NEUTROPHILS NFR BLD AUTO: 5.43 10*3/MM3 (ref 1.7–7)
NEUTROPHILS NFR BLD AUTO: 55.4 % (ref 42.7–76)
NRBC BLD AUTO-RTO: 0 /100 WBC (ref 0–0.2)
PLATELET # BLD AUTO: 276 10*3/MM3 (ref 140–450)
PMV BLD AUTO: 9.8 FL (ref 6–12)
POTASSIUM SERPL-SCNC: 4.4 MMOL/L (ref 3.5–4.7)
PROT SERPL-MCNC: 7.2 G/DL (ref 6.3–8)
RBC # BLD AUTO: 4.52 10*6/MM3 (ref 3.77–5.28)
SODIUM SERPL-SCNC: 138 MMOL/L (ref 134–145)
WBC NRBC COR # BLD: 9.81 10*3/MM3 (ref 3.4–10.8)

## 2022-03-24 PROCEDURE — 36415 COLL VENOUS BLD VENIPUNCTURE: CPT

## 2022-03-24 PROCEDURE — 80053 COMPREHEN METABOLIC PANEL: CPT

## 2022-03-24 PROCEDURE — 85025 COMPLETE CBC W/AUTO DIFF WBC: CPT

## 2022-03-24 PROCEDURE — 99214 OFFICE O/P EST MOD 30 MIN: CPT | Performed by: INTERNAL MEDICINE

## 2022-03-24 RX ORDER — OXYCODONE AND ACETAMINOPHEN 10; 325 MG/1; MG/1
TABLET ORAL
COMMUNITY
Start: 2022-03-10

## 2022-03-24 RX ORDER — MONTELUKAST SODIUM 10 MG/1
10 TABLET ORAL NIGHTLY
COMMUNITY
Start: 2022-02-17

## 2022-03-24 NOTE — PROGRESS NOTES
Subjective     REASON FOR follow-up:    · History of iron deficiency anemia recurrent s/p IV Feraheme in the past  · Leukocytosis chronic, JAK2 mutation negative, JAK2 mutation exon 12 - negative -, BCR able negative      HISTORY OF PRESENT ILLNESS:    Patient is 62-year-old female who was referred here for fatigue and was found to be iron deficient.  Patient again has iron deficiency and fatigued.  Even though her hemoglobin is normal her percent saturation is low.  And ferritin is low normal.  Apparently her insurance allows Venofer and not Injectafer.  She will also need to have referral to GI for iron deficiency anemia.    Her ferritin was 104 but her iron saturation was 13 which is very low and TIBC of 388.  Her B12 is 306 RBC folate was normal ESR was 50 which is improved TSH of 0.908 and T4 of 1.18.    Since she had leukocytosis chronically be checked JAK2 mutation which was negative and BCR able is negative.      Hematology history:  Patient is a 61-year-old female who has seen me in the past about 10 years ago with iron deficiency anemia and apparently received Feraheme infusions at that time.  Currently she is feeling fatigued and also has chronic leukocytosis and is here for evaluation.  She does have sinus infections and bronchitis for which she takes intermittent antibiotics.  She had to have drainage of her sinuses once.  She does complain of abdominal discomfort with fullness of the left upper quadrant.  Certainly we will need to evaluate her iron and B12 studies given her fatigue and likely  check her thyroid functions.    Patient has not lost weight.  She has got a reasonably good appetite.  She has chronic back pain and has had surgery in the low back many years ago.    Patient's past medical history is consistent with a TIA x2, hypertension,    Patient does smoke but not significant.  We have encouraged her to quit smoking    Past Medical History:   Diagnosis Date   • Anemia    • Anxiety    •  Awareness under anesthesia     X2   • Back pain    • Bulging lumbar disc    • Clostridium difficile colitis 2005   • DDD (degenerative disc disease), lumbar    • Depression    • Dizziness    • Elevated blood sugar level     PT STATES THIS WAS AFTER RECEIVING CORTISONE INJECTIONS IN KNEES   • Frequent UTI     CURRENTLY   • GERD (gastroesophageal reflux disease)    • High cholesterol    • History of anemia    • History of blood transfusion 2016   • History of blood transfusion    • History of Clostridium difficile 2012   • History of snoring    • History of TIAs     LAST ONE 2 YRS AGO   • Hypertension    • IBS (irritable bowel syndrome)    • Infectious mononucleosis    • Left ankle pain    • Leg pain, right    • On home oxygen therapy     2-3L NC AT NIGHT   • HECTOR (obstructive sleep apnea)    • Pancreas disorder     PT TAKES VICTOZA INJ    • PVC's (premature ventricular contractions)    • Restless leg syndrome    • Type 2 diabetes mellitus (HCC)         Past Surgical History:   Procedure Laterality Date   • ANKLE SURGERY Right    • BACK SURGERY     • FEMUR FRACTURE SURGERY     • HYSTERECTOMY     • KNEE ARTHROPLASTY Right    • NASAL SINUS SURGERY      X3   • OK REVISE KNEE JOINT REPLACE,1 PART Right 8/15/2016    Procedure: RT TOTAL KNEE ARTHROPLASTY REVISION;  Surgeon: Alcides Ramirez MD;  Location: [facility];  Service: Orthopedics        Current Outpatient Medications on File Prior to Visit   Medication Sig Dispense Refill   • albuterol sulfate  (90 Base) MCG/ACT inhaler Inhale 2 puffs.     • amLODIPine (NORVASC) 10 MG tablet TAKE 1 TABLET BY MOUTH EVERY DAY AT NIGHT     • Armodafinil (NUVIGIL PO) Take 250 mg by mouth every morning.     • celecoxib (CeleBREX) 200 MG capsule TAKE 1 CAPSULE BY MOUTH TWICE DAILY AS NEEDED FOR PAIN     • clopidogrel (PLAVIX) 75 MG tablet Take 75 mg by mouth daily.     • Diclofenac Sodium (VOLTAREN) 1 % gel gel APPLY 2-4 GRAMS TO THE AFFECTED AREA 1-2 TIMES PER DAY     •  Dulaglutide (Trulicity) 3 MG/0.5ML solution pen-injector Inject 3 mg under the skin into the appropriate area as directed 1 (One) Time Per Week. 6 mL 1   • DULoxetine (CYMBALTA) 60 MG capsule TAKE 1 CAPSULE BY MOUTH TWICE DAILY     • furosemide (LASIX) 20 MG tablet Take 1 tablet by mouth Daily As Needed.     • gabapentin (NEURONTIN) 100 MG capsule Take 100 mg by mouth 3 (Three) Times a Day.     • Hyoscyamine Sulfate (LEVBID PO) Take 1.25 mg by mouth daily as needed.     • Insulin Pen Needle 32G X 4 MM misc Use as directed twice daily 100 each PRN   • lisinopril (PRINIVIL,ZESTRIL) 40 MG tablet Take 40 mg by mouth Daily.     • metoprolol succinate XL (TOPROL-XL) 100 MG 24 hr tablet TAKE 2 TABLETS BY MOUTH EVERY DAY AT NIGHT     • montelukast (SINGULAIR) 10 MG tablet Take 10 mg by mouth Every Night.     • omeprazole (prilOSEC) 10 MG capsule Take 10 mg by mouth Daily.     • ondansetron ODT (ZOFRAN-ODT) 4 MG disintegrating tablet Take 1 tablet by mouth 4 (Four) Times a Day As Needed for Nausea or Vomiting. 15 tablet 0   • ONETOUCH DELICA LANCETS FINE misc 1 each 3 (Three) Times a Day. 100 each 5   • ONETOUCH VERIO test strip Test 2-3 times daily Use as instructed 100 each 5   • oxyCODONE-acetaminophen (PERCOCET)  MG per tablet 1 TABLET AS NEEDED ORAL EVERY 6 HRS 30 DAYS     • rOPINIRole (REQUIP) 2 MG tablet Take 4 mg by mouth At Night As Needed.     • rosuvastatin (CRESTOR) 40 MG tablet TAKE 1 TABLET BY MOUTH EVERY DAY 90 tablet 3   • Synjardy XR 12.5-1000 MG tablet sustained-release 24 hour TAKE 2 TABLETS BY MOUTH DAILY WITH BREAKFAST. 60 tablet 6   • traZODone (DESYREL) 100 MG tablet Take 300 mg by mouth Every Night.     • vitamin D (ERGOCALCIFEROL) 1.25 MG (75957 UT) capsule capsule Take 1 capsule by mouth 1 (One) Time Per Week. 12 capsule 1   • Insulin Glargine (BASAGLAR KWIKPEN) 100 UNIT/ML injection pen Inject 80 Units under the skin into the appropriate area as directed Every Morning AND 40 Units Every  Evening. 105 mL 0     No current facility-administered medications on file prior to visit.        ALLERGIES:    Allergies   Allergen Reactions   • Cortisone Other (See Comments)     TIA-POST INJECTION- KNEES   • Aspirin Buf(Cacarb-Mgcarb-Mgo) Unknown (See Comments)     Nose bleeds   • Augmentin [Amoxicillin-Pot Clavulanate] GI Intolerance        Social History     Socioeconomic History   • Marital status:      Spouse name: Jose   • Number of children: 1   Tobacco Use   • Smoking status: Never Smoker   • Smokeless tobacco: Never Used   Substance and Sexual Activity   • Alcohol use: Yes     Comment: RARELY   • Drug use: No   • Sexual activity: Defer        Family History   Problem Relation Age of Onset   • Hypertension Father    • Heart disease Father    • Heart attack Father    • Asthma Brother    • Colon cancer Paternal Grandfather         Review of Systems   Constitutional: Negative for appetite change, chills, diaphoresis, fatigue, fever and unexpected weight change.   HENT: Negative for hearing loss, sore throat and trouble swallowing.    Respiratory: Negative for cough, chest tightness, shortness of breath and wheezing.    Cardiovascular: Negative for chest pain, palpitations and leg swelling.   Gastrointestinal: Negative for abdominal distention, abdominal pain, constipation, diarrhea, nausea and vomiting.   Genitourinary: Negative for dysuria, frequency, hematuria and urgency.   Musculoskeletal: Negative for joint swelling.        No muscle weakness.   Skin: Negative for rash and wound.   Neurological: Negative for seizures, syncope, speech difficulty, weakness, numbness and headaches.   Hematological: Negative for adenopathy. Does not bruise/bleed easily.   Psychiatric/Behavioral: Negative for behavioral problems, confusion and suicidal ideas.   All other systems reviewed and are negative.       Objective     Vitals:    03/24/22 1546   BP: 173/99   Pulse: 81   Resp: 18   Temp: 96.8 °F (36 °C)  "  TempSrc: Temporal   SpO2: 96%   Weight: 124 kg (273 lb 4.8 oz)   Height: 170.5 cm (67.13\")   PainSc: 0-No pain     Current Status 3/24/2022   ECOG score 1       Physical Exam        CONSTITUTIONAL:  Vital signs reviewed.  No distress, looks comfortable.    RESPIRATORY:  Normal respiratory effort.  Lungs clear to auscultation bilaterally.  CARDIOVASCULAR:  Normal S1, S2.  No murmurs rubs or gallops.  No significant lower extremity edema.  GASTROINTESTINAL: Abdomen appears unremarkable.  Nontender.  No hepatomegaly.  No splenomegaly.  LYMPHATIC:  No cervical, supraclavicular, axillary lymphadenopathy.  SKIN:  Warm.  No rashes.  PSYCHIATRIC:  Normal judgment and insight.  Normal mood and affect..    RECENT LABS:  Hematology WBC   Date Value Ref Range Status   03/24/2022 9.81 3.40 - 10.80 10*3/mm3 Final   08/17/2020 12.34 (H) 4.5 - 11.0 10*3/uL Final     RBC   Date Value Ref Range Status   03/24/2022 4.52 3.77 - 5.28 10*6/mm3 Final   08/17/2020 4.78 4.0 - 5.2 10*6/uL Final     Hemoglobin   Date Value Ref Range Status   03/24/2022 12.6 12.0 - 15.9 g/dL Final   08/17/2020 13.2 12.0 - 16.0 g/dL Final     Hematocrit   Date Value Ref Range Status   03/24/2022 39.7 34.0 - 46.6 % Final   08/17/2020 43.0 36.0 - 46.0 % Final     Platelets   Date Value Ref Range Status   03/24/2022 276 140 - 450 10*3/mm3 Final   08/17/2020 326 140 - 440 10*3/uL Final          Assessment/Plan     *History of recurrent iron deficiency anemia treated with Feraheme in the past  · Patient now complaining of significant fatigue even though her hemoglobin appears normal  · Patient denies active GI bleeding  · She did have a colonoscopy in the past and that seemed to be negative  · March 24, 2022: Reviewed anemia work-up with B12 306, ESR 50, RBC folate normal thyroid function test normal ferritin 104 and percent saturation of 13 with TIBC elevation indicating iron deficiency  · Will refer to GI and start IV Venofer    *Leukocytosis, " chronic  · Reviewing the records she has had intermittent leukocytosis anywhere from 11-13  · She also complained of abdominal tenderness and discomfort  · She will require complete evaluation with CT scan of the abdomen pelvis.  We will likely obtain C-reactive protein, ESR and plain x-rays of the CT of the sinuses and CT scan of the abdomen and pelvis  · JAK2 mutation negative JAK2 exon 12 - negative, BCR see able negative negative    Plan  · Start IV Venofer.  Patient is unable to tolerate oral iron as it causes her abdominal discomfort and constipation and hence will need to give her IV iron  · Refer to GI  · Reviewed rest of the anemia work-up with B12 of 306 low normal.  Patient to take oral B12 1000 mcg daily, 3 times a week  · Reviewed work-up for myeloproliferative disorder, negative work-up as above  · Follow-up with nurse practitioner in 6 weeks and with me in 3 months with labs    Debby Avilez MD

## 2022-03-25 LAB — REF LAB TEST METHOD: NORMAL

## 2022-03-26 PROBLEM — J32.9 CHRONIC SINUSITIS: Status: ACTIVE | Noted: 2022-03-26

## 2022-04-01 ENCOUNTER — OFFICE VISIT (OUTPATIENT)
Dept: ONCOLOGY | Facility: CLINIC | Age: 62
End: 2022-04-01

## 2022-04-01 ENCOUNTER — APPOINTMENT (OUTPATIENT)
Dept: ONCOLOGY | Facility: HOSPITAL | Age: 62
End: 2022-04-01

## 2022-04-01 ENCOUNTER — INFUSION (OUTPATIENT)
Dept: ONCOLOGY | Facility: HOSPITAL | Age: 62
End: 2022-04-01

## 2022-04-01 ENCOUNTER — LAB (OUTPATIENT)
Dept: LAB | Facility: HOSPITAL | Age: 62
End: 2022-04-01

## 2022-04-01 VITALS
HEART RATE: 74 BPM | TEMPERATURE: 97.2 F | WEIGHT: 271.1 LBS | OXYGEN SATURATION: 95 % | DIASTOLIC BLOOD PRESSURE: 70 MMHG | HEIGHT: 67 IN | SYSTOLIC BLOOD PRESSURE: 121 MMHG | RESPIRATION RATE: 17 BRPM | BODY MASS INDEX: 42.55 KG/M2

## 2022-04-01 VITALS — HEART RATE: 81 BPM | SYSTOLIC BLOOD PRESSURE: 115 MMHG | DIASTOLIC BLOOD PRESSURE: 72 MMHG

## 2022-04-01 DIAGNOSIS — E61.1 IRON DEFICIENCY: Primary | ICD-10-CM

## 2022-04-01 DIAGNOSIS — R53.83 OTHER FATIGUE: ICD-10-CM

## 2022-04-01 DIAGNOSIS — T45.4X5A ADVERSE EFFECT OF IRON, INITIAL ENCOUNTER: ICD-10-CM

## 2022-04-01 LAB
BASOPHILS # BLD AUTO: 0.07 10*3/MM3 (ref 0–0.2)
BASOPHILS NFR BLD AUTO: 0.6 % (ref 0–1.5)
DEPRECATED RDW RBC AUTO: 40.2 FL (ref 37–54)
EOSINOPHIL # BLD AUTO: 0.26 10*3/MM3 (ref 0–0.4)
EOSINOPHIL NFR BLD AUTO: 2.3 % (ref 0.3–6.2)
ERYTHROCYTE [DISTWIDTH] IN BLOOD BY AUTOMATED COUNT: 13.1 % (ref 12.3–15.4)
FERRITIN SERPL-MCNC: 75.9 NG/ML (ref 13–150)
HCT VFR BLD AUTO: 41.8 % (ref 34–46.6)
HGB BLD-MCNC: 13.7 G/DL (ref 12–15.9)
IMM GRANULOCYTES # BLD AUTO: 0.07 10*3/MM3 (ref 0–0.05)
IMM GRANULOCYTES NFR BLD AUTO: 0.6 % (ref 0–0.5)
IRON 24H UR-MRATE: 65 MCG/DL (ref 37–145)
IRON SATN MFR SERPL: 18 % (ref 14–48)
LYMPHOCYTES # BLD AUTO: 3.68 10*3/MM3 (ref 0.7–3.1)
LYMPHOCYTES NFR BLD AUTO: 32.9 % (ref 19.6–45.3)
MCH RBC QN AUTO: 28 PG (ref 26.6–33)
MCHC RBC AUTO-ENTMCNC: 32.8 G/DL (ref 31.5–35.7)
MCV RBC AUTO: 85.3 FL (ref 79–97)
MONOCYTES # BLD AUTO: 0.79 10*3/MM3 (ref 0.1–0.9)
MONOCYTES NFR BLD AUTO: 7.1 % (ref 5–12)
NEUTROPHILS NFR BLD AUTO: 56.5 % (ref 42.7–76)
NEUTROPHILS NFR BLD AUTO: 6.31 10*3/MM3 (ref 1.7–7)
NRBC BLD AUTO-RTO: 0 /100 WBC (ref 0–0.2)
PLATELET # BLD AUTO: 281 10*3/MM3 (ref 140–450)
PMV BLD AUTO: 10 FL (ref 6–12)
RBC # BLD AUTO: 4.9 10*6/MM3 (ref 3.77–5.28)
TIBC SERPL-MCNC: 370 MCG/DL (ref 249–505)
TRANSFERRIN SERPL-MCNC: 264 MG/DL (ref 200–360)
WBC NRBC COR # BLD: 11.18 10*3/MM3 (ref 3.4–10.8)

## 2022-04-01 PROCEDURE — 36415 COLL VENOUS BLD VENIPUNCTURE: CPT

## 2022-04-01 PROCEDURE — 25010000002 IRON SUCROSE PER 1 MG: Performed by: NURSE PRACTITIONER

## 2022-04-01 PROCEDURE — 82728 ASSAY OF FERRITIN: CPT

## 2022-04-01 PROCEDURE — 85025 COMPLETE CBC W/AUTO DIFF WBC: CPT

## 2022-04-01 PROCEDURE — 84466 ASSAY OF TRANSFERRIN: CPT

## 2022-04-01 PROCEDURE — 99213 OFFICE O/P EST LOW 20 MIN: CPT | Performed by: NURSE PRACTITIONER

## 2022-04-01 PROCEDURE — 63710000001 DIPHENHYDRAMINE PER 50 MG: Performed by: NURSE PRACTITIONER

## 2022-04-01 PROCEDURE — 96365 THER/PROPH/DIAG IV INF INIT: CPT

## 2022-04-01 PROCEDURE — 83540 ASSAY OF IRON: CPT

## 2022-04-01 RX ORDER — ACETAMINOPHEN 325 MG/1
650 TABLET ORAL ONCE
Status: COMPLETED | OUTPATIENT
Start: 2022-04-01 | End: 2022-04-01

## 2022-04-01 RX ORDER — SODIUM CHLORIDE 9 MG/ML
250 INJECTION, SOLUTION INTRAVENOUS ONCE
Status: COMPLETED | OUTPATIENT
Start: 2022-04-01 | End: 2022-04-01

## 2022-04-01 RX ORDER — DIPHENHYDRAMINE HCL 25 MG
25 CAPSULE ORAL ONCE
Status: COMPLETED | OUTPATIENT
Start: 2022-04-01 | End: 2022-04-01

## 2022-04-01 RX ADMIN — IRON SUCROSE 200 MG: 20 INJECTION, SOLUTION INTRAVENOUS at 11:15

## 2022-04-01 RX ADMIN — DIPHENHYDRAMINE HYDROCHLORIDE 25 MG: 25 CAPSULE ORAL at 10:53

## 2022-04-01 RX ADMIN — ACETAMINOPHEN 650 MG: 325 TABLET ORAL at 10:53

## 2022-04-01 RX ADMIN — SODIUM CHLORIDE 250 ML: 9 INJECTION, SOLUTION INTRAVENOUS at 10:52

## 2022-04-08 ENCOUNTER — INFUSION (OUTPATIENT)
Dept: ONCOLOGY | Facility: HOSPITAL | Age: 62
End: 2022-04-08

## 2022-04-08 VITALS
RESPIRATION RATE: 16 BRPM | OXYGEN SATURATION: 91 % | HEART RATE: 63 BPM | DIASTOLIC BLOOD PRESSURE: 64 MMHG | BODY MASS INDEX: 42.5 KG/M2 | TEMPERATURE: 97.5 F | SYSTOLIC BLOOD PRESSURE: 113 MMHG | WEIGHT: 272.4 LBS

## 2022-04-08 DIAGNOSIS — T45.4X5A ADVERSE EFFECT OF IRON, INITIAL ENCOUNTER: Primary | ICD-10-CM

## 2022-04-08 DIAGNOSIS — E61.1 IRON DEFICIENCY: ICD-10-CM

## 2022-04-08 PROCEDURE — 96365 THER/PROPH/DIAG IV INF INIT: CPT

## 2022-04-08 PROCEDURE — 25010000002 IRON SUCROSE PER 1 MG: Performed by: INTERNAL MEDICINE

## 2022-04-08 PROCEDURE — 63710000001 DIPHENHYDRAMINE PER 50 MG: Performed by: INTERNAL MEDICINE

## 2022-04-08 RX ORDER — DIPHENHYDRAMINE HCL 25 MG
25 CAPSULE ORAL ONCE
Status: COMPLETED | OUTPATIENT
Start: 2022-04-08 | End: 2022-04-08

## 2022-04-08 RX ORDER — ACETAMINOPHEN 325 MG/1
650 TABLET ORAL ONCE
Status: COMPLETED | OUTPATIENT
Start: 2022-04-08 | End: 2022-04-08

## 2022-04-08 RX ORDER — SODIUM CHLORIDE 9 MG/ML
250 INJECTION, SOLUTION INTRAVENOUS ONCE
Status: COMPLETED | OUTPATIENT
Start: 2022-04-08 | End: 2022-04-08

## 2022-04-08 RX ADMIN — IRON SUCROSE 200 MG: 20 INJECTION, SOLUTION INTRAVENOUS at 09:36

## 2022-04-08 RX ADMIN — ACETAMINOPHEN 650 MG: 325 TABLET ORAL at 09:10

## 2022-04-08 RX ADMIN — SODIUM CHLORIDE 250 ML: 9 INJECTION, SOLUTION INTRAVENOUS at 09:11

## 2022-04-08 RX ADMIN — DIPHENHYDRAMINE HYDROCHLORIDE 25 MG: 25 CAPSULE ORAL at 09:10

## 2022-04-15 ENCOUNTER — INFUSION (OUTPATIENT)
Dept: ONCOLOGY | Facility: HOSPITAL | Age: 62
End: 2022-04-15

## 2022-04-15 VITALS
DIASTOLIC BLOOD PRESSURE: 67 MMHG | WEIGHT: 273.4 LBS | SYSTOLIC BLOOD PRESSURE: 102 MMHG | OXYGEN SATURATION: 90 % | HEART RATE: 66 BPM | BODY MASS INDEX: 42.66 KG/M2 | TEMPERATURE: 97.3 F | RESPIRATION RATE: 16 BRPM

## 2022-04-15 DIAGNOSIS — T45.4X5A ADVERSE EFFECT OF IRON, INITIAL ENCOUNTER: Primary | ICD-10-CM

## 2022-04-15 DIAGNOSIS — E61.1 IRON DEFICIENCY: ICD-10-CM

## 2022-04-15 PROCEDURE — 25010000002 IRON SUCROSE PER 1 MG: Performed by: NURSE PRACTITIONER

## 2022-04-15 PROCEDURE — 63710000001 DIPHENHYDRAMINE PER 50 MG: Performed by: NURSE PRACTITIONER

## 2022-04-15 PROCEDURE — 96365 THER/PROPH/DIAG IV INF INIT: CPT

## 2022-04-15 RX ORDER — SODIUM CHLORIDE 9 MG/ML
250 INJECTION, SOLUTION INTRAVENOUS ONCE
Status: COMPLETED | OUTPATIENT
Start: 2022-04-15 | End: 2022-04-15

## 2022-04-15 RX ORDER — DIPHENHYDRAMINE HCL 25 MG
25 CAPSULE ORAL ONCE
Status: COMPLETED | OUTPATIENT
Start: 2022-04-15 | End: 2022-04-15

## 2022-04-15 RX ORDER — ACETAMINOPHEN 325 MG/1
650 TABLET ORAL ONCE
Status: COMPLETED | OUTPATIENT
Start: 2022-04-15 | End: 2022-04-15

## 2022-04-15 RX ADMIN — DIPHENHYDRAMINE HYDROCHLORIDE 25 MG: 25 CAPSULE ORAL at 09:12

## 2022-04-15 RX ADMIN — SODIUM CHLORIDE 250 ML: 9 INJECTION, SOLUTION INTRAVENOUS at 09:18

## 2022-04-15 RX ADMIN — IRON SUCROSE 200 MG: 20 INJECTION, SOLUTION INTRAVENOUS at 09:39

## 2022-04-15 RX ADMIN — ACETAMINOPHEN 650 MG: 325 TABLET ORAL at 09:12

## 2022-04-18 ENCOUNTER — SPECIALTY PHARMACY (OUTPATIENT)
Dept: ENDOCRINOLOGY | Age: 62
End: 2022-04-18

## 2022-04-22 ENCOUNTER — INFUSION (OUTPATIENT)
Dept: ONCOLOGY | Facility: HOSPITAL | Age: 62
End: 2022-04-22

## 2022-04-22 VITALS
TEMPERATURE: 98 F | HEART RATE: 80 BPM | RESPIRATION RATE: 18 BRPM | SYSTOLIC BLOOD PRESSURE: 140 MMHG | BODY MASS INDEX: 42.13 KG/M2 | DIASTOLIC BLOOD PRESSURE: 77 MMHG | WEIGHT: 270 LBS | OXYGEN SATURATION: 90 %

## 2022-04-22 DIAGNOSIS — T45.4X5A ADVERSE EFFECT OF IRON, INITIAL ENCOUNTER: Primary | ICD-10-CM

## 2022-04-22 DIAGNOSIS — E61.1 IRON DEFICIENCY: ICD-10-CM

## 2022-04-22 PROCEDURE — 96365 THER/PROPH/DIAG IV INF INIT: CPT

## 2022-04-22 PROCEDURE — 25010000002 IRON SUCROSE PER 1 MG: Performed by: INTERNAL MEDICINE

## 2022-04-22 PROCEDURE — 63710000001 DIPHENHYDRAMINE PER 50 MG: Performed by: INTERNAL MEDICINE

## 2022-04-22 RX ORDER — SODIUM CHLORIDE 9 MG/ML
250 INJECTION, SOLUTION INTRAVENOUS ONCE
Status: COMPLETED | OUTPATIENT
Start: 2022-04-22 | End: 2022-04-22

## 2022-04-22 RX ORDER — DIPHENHYDRAMINE HCL 25 MG
25 CAPSULE ORAL ONCE
Status: COMPLETED | OUTPATIENT
Start: 2022-04-22 | End: 2022-04-22

## 2022-04-22 RX ORDER — ACETAMINOPHEN 325 MG/1
650 TABLET ORAL ONCE
Status: COMPLETED | OUTPATIENT
Start: 2022-04-22 | End: 2022-04-22

## 2022-04-22 RX ADMIN — ACETAMINOPHEN 650 MG: 325 TABLET ORAL at 09:33

## 2022-04-22 RX ADMIN — DIPHENHYDRAMINE HYDROCHLORIDE 25 MG: 25 CAPSULE ORAL at 09:33

## 2022-04-22 RX ADMIN — SODIUM CHLORIDE 250 ML: 9 INJECTION, SOLUTION INTRAVENOUS at 09:32

## 2022-04-22 RX ADMIN — IRON SUCROSE 200 MG: 20 INJECTION, SOLUTION INTRAVENOUS at 09:48

## 2022-04-29 ENCOUNTER — INFUSION (OUTPATIENT)
Dept: ONCOLOGY | Facility: HOSPITAL | Age: 62
End: 2022-04-29

## 2022-04-29 VITALS
OXYGEN SATURATION: 92 % | HEART RATE: 101 BPM | RESPIRATION RATE: 18 BRPM | TEMPERATURE: 98.2 F | BODY MASS INDEX: 41.22 KG/M2 | WEIGHT: 264.2 LBS | DIASTOLIC BLOOD PRESSURE: 94 MMHG | SYSTOLIC BLOOD PRESSURE: 176 MMHG

## 2022-04-29 DIAGNOSIS — T45.4X5A ADVERSE EFFECT OF IRON, INITIAL ENCOUNTER: Primary | ICD-10-CM

## 2022-04-29 DIAGNOSIS — E61.1 IRON DEFICIENCY: ICD-10-CM

## 2022-04-29 PROCEDURE — 25010000002 IRON SUCROSE PER 1 MG: Performed by: INTERNAL MEDICINE

## 2022-04-29 PROCEDURE — 96365 THER/PROPH/DIAG IV INF INIT: CPT

## 2022-04-29 PROCEDURE — 63710000001 DIPHENHYDRAMINE PER 50 MG: Performed by: INTERNAL MEDICINE

## 2022-04-29 RX ORDER — DIPHENHYDRAMINE HCL 25 MG
25 CAPSULE ORAL ONCE
Status: COMPLETED | OUTPATIENT
Start: 2022-04-29 | End: 2022-04-29

## 2022-04-29 RX ORDER — SODIUM CHLORIDE 9 MG/ML
250 INJECTION, SOLUTION INTRAVENOUS ONCE
Status: COMPLETED | OUTPATIENT
Start: 2022-04-29 | End: 2022-04-29

## 2022-04-29 RX ORDER — ACETAMINOPHEN 325 MG/1
650 TABLET ORAL ONCE
Status: COMPLETED | OUTPATIENT
Start: 2022-04-29 | End: 2022-04-29

## 2022-04-29 RX ADMIN — ACETAMINOPHEN 650 MG: 325 TABLET ORAL at 09:23

## 2022-04-29 RX ADMIN — SODIUM CHLORIDE 250 ML: 9 INJECTION, SOLUTION INTRAVENOUS at 09:23

## 2022-04-29 RX ADMIN — DIPHENHYDRAMINE HYDROCHLORIDE 25 MG: 25 CAPSULE ORAL at 09:23

## 2022-04-29 RX ADMIN — IRON SUCROSE 200 MG: 20 INJECTION, SOLUTION INTRAVENOUS at 09:40

## 2022-05-06 ENCOUNTER — LAB (OUTPATIENT)
Dept: LAB | Facility: HOSPITAL | Age: 62
End: 2022-05-06

## 2022-05-06 ENCOUNTER — OFFICE VISIT (OUTPATIENT)
Dept: ONCOLOGY | Facility: CLINIC | Age: 62
End: 2022-05-06

## 2022-05-06 VITALS
RESPIRATION RATE: 18 BRPM | WEIGHT: 264.9 LBS | SYSTOLIC BLOOD PRESSURE: 152 MMHG | HEART RATE: 68 BPM | DIASTOLIC BLOOD PRESSURE: 80 MMHG | BODY MASS INDEX: 40.15 KG/M2 | OXYGEN SATURATION: 94 % | TEMPERATURE: 97.1 F | HEIGHT: 68 IN

## 2022-05-06 DIAGNOSIS — E61.1 IRON DEFICIENCY: ICD-10-CM

## 2022-05-06 DIAGNOSIS — T45.4X5A ADVERSE EFFECT OF IRON, INITIAL ENCOUNTER: ICD-10-CM

## 2022-05-06 DIAGNOSIS — E61.1 IRON DEFICIENCY: Primary | ICD-10-CM

## 2022-05-06 DIAGNOSIS — R53.83 OTHER FATIGUE: ICD-10-CM

## 2022-05-06 DIAGNOSIS — D72.829 LEUKOCYTOSIS, UNSPECIFIED TYPE: ICD-10-CM

## 2022-05-06 LAB
BASOPHILS # BLD AUTO: 0.07 10*3/MM3 (ref 0–0.2)
BASOPHILS NFR BLD AUTO: 0.6 % (ref 0–1.5)
DEPRECATED RDW RBC AUTO: 44.3 FL (ref 37–54)
EOSINOPHIL # BLD AUTO: 0.23 10*3/MM3 (ref 0–0.4)
EOSINOPHIL NFR BLD AUTO: 2 % (ref 0.3–6.2)
ERYTHROCYTE [DISTWIDTH] IN BLOOD BY AUTOMATED COUNT: 13.8 % (ref 12.3–15.4)
FERRITIN SERPL-MCNC: 499 NG/ML (ref 13–150)
HCT VFR BLD AUTO: 40.8 % (ref 34–46.6)
HGB BLD-MCNC: 13.2 G/DL (ref 12–15.9)
HGB RETIC QN AUTO: 33.7 PG (ref 29.8–36.1)
IMM GRANULOCYTES # BLD AUTO: 0.05 10*3/MM3 (ref 0–0.05)
IMM GRANULOCYTES NFR BLD AUTO: 0.4 % (ref 0–0.5)
IMM RETICS NFR: 9.5 % (ref 3–15.8)
IRON 24H UR-MRATE: 77 MCG/DL (ref 37–145)
IRON SATN MFR SERPL: 22 % (ref 14–48)
LYMPHOCYTES # BLD AUTO: 4.41 10*3/MM3 (ref 0.7–3.1)
LYMPHOCYTES NFR BLD AUTO: 38.3 % (ref 19.6–45.3)
MCH RBC QN AUTO: 28.5 PG (ref 26.6–33)
MCHC RBC AUTO-ENTMCNC: 32.4 G/DL (ref 31.5–35.7)
MCV RBC AUTO: 88.1 FL (ref 79–97)
MONOCYTES # BLD AUTO: 0.95 10*3/MM3 (ref 0.1–0.9)
MONOCYTES NFR BLD AUTO: 8.3 % (ref 5–12)
NEUTROPHILS NFR BLD AUTO: 5.8 10*3/MM3 (ref 1.7–7)
NEUTROPHILS NFR BLD AUTO: 50.4 % (ref 42.7–76)
NRBC BLD AUTO-RTO: 0 /100 WBC (ref 0–0.2)
PLATELET # BLD AUTO: 252 10*3/MM3 (ref 140–450)
PMV BLD AUTO: 10.2 FL (ref 6–12)
RBC # BLD AUTO: 4.63 10*6/MM3 (ref 3.77–5.28)
RETICS # AUTO: 0.1 10*6/MM3 (ref 0.02–0.13)
RETICS/RBC NFR AUTO: 2.13 % (ref 0.7–1.9)
TIBC SERPL-MCNC: 354 MCG/DL (ref 249–505)
TRANSFERRIN SERPL-MCNC: 253 MG/DL (ref 200–360)
WBC NRBC COR # BLD: 11.51 10*3/MM3 (ref 3.4–10.8)

## 2022-05-06 PROCEDURE — 36415 COLL VENOUS BLD VENIPUNCTURE: CPT

## 2022-05-06 PROCEDURE — 83540 ASSAY OF IRON: CPT

## 2022-05-06 PROCEDURE — 82728 ASSAY OF FERRITIN: CPT

## 2022-05-06 PROCEDURE — 99214 OFFICE O/P EST MOD 30 MIN: CPT | Performed by: NURSE PRACTITIONER

## 2022-05-06 PROCEDURE — 85046 RETICYTE/HGB CONCENTRATE: CPT

## 2022-05-06 PROCEDURE — 84466 ASSAY OF TRANSFERRIN: CPT

## 2022-05-06 PROCEDURE — 85025 COMPLETE CBC W/AUTO DIFF WBC: CPT

## 2022-05-06 RX ORDER — METHOCARBAMOL 500 MG/1
1-2 TABLET, FILM COATED ORAL EVERY 8 HOURS PRN
COMMUNITY

## 2022-05-06 RX ORDER — ROPINIROLE 1 MG/1
2 TABLET, FILM COATED ORAL NIGHTLY
COMMUNITY
Start: 2022-04-04

## 2022-05-06 RX ORDER — TRIAMCINOLONE ACETONIDE 5 MG/G
CREAM TOPICAL
COMMUNITY
Start: 2022-05-03 | End: 2023-05-03

## 2022-05-06 RX ORDER — NITROFURANTOIN 25; 75 MG/1; MG/1
100 CAPSULE ORAL
COMMUNITY
Start: 2022-05-05 | End: 2022-05-12

## 2022-05-06 RX ORDER — SULFAMETHOXAZOLE AND TRIMETHOPRIM 800; 160 MG/1; MG/1
1 TABLET ORAL
COMMUNITY
Start: 2022-05-03 | End: 2022-05-10

## 2022-05-06 RX ORDER — GABAPENTIN 600 MG/1
TABLET ORAL EVERY 8 HOURS SCHEDULED
COMMUNITY
End: 2022-07-05 | Stop reason: SDUPTHER

## 2022-05-06 RX ORDER — GABAPENTIN 600 MG/1
600 TABLET ORAL 3 TIMES DAILY
COMMUNITY
Start: 2022-04-07

## 2022-05-06 RX ORDER — EMPAGLIFLOZIN, METFORMIN HYDROCHLORIDE 12.5; 1 MG/1; MG/1
2 TABLET, EXTENDED RELEASE ORAL
COMMUNITY
End: 2022-05-06

## 2022-05-06 RX ORDER — ROPINIROLE 1 MG/1
2 TABLET, FILM COATED ORAL NIGHTLY
COMMUNITY
End: 2022-05-06

## 2022-05-06 NOTE — PROGRESS NOTES
Subjective     REASON FOR follow-up:    · History of iron deficiency anemia recurrent s/p IV Feraheme in the past  · Leukocytosis chronic, JAK2 mutation negative, JAK2 mutation exon 12 - negative -, BCR able negative      HISTORY OF PRESENT ILLNESS:     The patient is a 62 y.o. female with above history, who returns the office today for 6-week follow-up.  She has completed 5 weeks of IV Venofer which she tolerated very well.  She notes improvement in her ice cravings though does continue to struggle with some fatigue.  She was recently diagnosed with a urinary tract infection and placed on antibiotics and is hopeful that treatment of urinary tract infection will also improve her energy.  She denies signs or symptoms of bleeding.  She has not been seen by GI.  She reports her last colonoscopy was 2 and half years ago.  She denies signs or symptoms of bleeding.    Hematology history:  Patient is a 61-year-old female who has seen me in the past about 10 years ago with iron deficiency anemia and apparently received Feraheme infusions at that time.  Currently she is feeling fatigued and also has chronic leukocytosis and is here for evaluation.  She does have sinus infections and bronchitis for which she takes intermittent antibiotics.  She had to have drainage of her sinuses once.  She does complain of abdominal discomfort with fullness of the left upper quadrant.  Certainly we will need to evaluate her iron and B12 studies given her fatigue and likely  check her thyroid functions.    Patient has not lost weight.  She has got a reasonably good appetite.  She has chronic back pain and has had surgery in the low back many years ago.    Patient's past medical history is consistent with a TIA x2, hypertension,    Patient does smoke but not significant.  We have encouraged her to quit smoking    Past Medical History:   Diagnosis Date   • Anemia    • Anxiety    • Awareness under anesthesia     X2   • Back pain    • Bulging  lumbar disc    • Clostridium difficile colitis 2005   • DDD (degenerative disc disease), lumbar    • Depression    • Dizziness    • Elevated blood sugar level     PT STATES THIS WAS AFTER RECEIVING CORTISONE INJECTIONS IN KNEES   • Frequent UTI     CURRENTLY   • GERD (gastroesophageal reflux disease)    • High cholesterol    • History of anemia    • History of blood transfusion 2016   • History of blood transfusion    • History of Clostridium difficile 2012   • History of snoring    • History of TIAs     LAST ONE 2 YRS AGO   • Hypertension    • IBS (irritable bowel syndrome)    • Infectious mononucleosis    • Left ankle pain    • Leg pain, right    • On home oxygen therapy     2-3L NC AT NIGHT   • HECTOR (obstructive sleep apnea)    • Pancreas disorder     PT TAKES VICTOZA INJ    • PVC's (premature ventricular contractions)    • Restless leg syndrome    • Type 2 diabetes mellitus (HCC)         Past Surgical History:   Procedure Laterality Date   • ANKLE SURGERY Right    • BACK SURGERY     • FEMUR FRACTURE SURGERY     • HYSTERECTOMY     • KNEE ARTHROPLASTY Right    • NASAL SINUS SURGERY      X3   • OR REVISE KNEE JOINT REPLACE,1 PART Right 8/15/2016    Procedure: RT TOTAL KNEE ARTHROPLASTY REVISION;  Surgeon: Alcides Ramirez MD;  Location: [facility];  Service: Orthopedics        Current Outpatient Medications on File Prior to Visit   Medication Sig Dispense Refill   • albuterol sulfate  (90 Base) MCG/ACT inhaler Inhale 2 puffs.     • amLODIPine (NORVASC) 10 MG tablet TAKE 1 TABLET BY MOUTH EVERY DAY AT NIGHT     • Armodafinil (NUVIGIL PO) Take 250 mg by mouth every morning.     • celecoxib (CeleBREX) 200 MG capsule TAKE 1 CAPSULE BY MOUTH TWICE DAILY AS NEEDED FOR PAIN     • clopidogrel (PLAVIX) 75 MG tablet Take 75 mg by mouth daily.     • Diclofenac Sodium (VOLTAREN) 1 % gel gel APPLY 2-4 GRAMS TO THE AFFECTED AREA 1-2 TIMES PER DAY     • Dulaglutide (Trulicity) 3 MG/0.5ML solution pen-injector Inject 3 mg  under the skin into the appropriate area as directed 1 (One) Time Per Week. 6 mL 1   • DULoxetine (CYMBALTA) 60 MG capsule TAKE 1 CAPSULE BY MOUTH TWICE DAILY     • furosemide (LASIX) 20 MG tablet Take 1 tablet by mouth Daily As Needed.     • gabapentin (NEURONTIN) 600 MG tablet Take 600 mg by mouth 3 (Three) Times a Day.     • Hyoscyamine Sulfate (LEVBID PO) Take 1.25 mg by mouth daily as needed.     • Insulin Pen Needle 32G X 4 MM misc Use as directed twice daily 100 each PRN   • lisinopril (PRINIVIL,ZESTRIL) 40 MG tablet Take 40 mg by mouth Daily.     • methocarbamol (ROBAXIN) 500 MG tablet Take 1-2 tablets by mouth Every 8 (Eight) Hours As Needed.     • metoprolol succinate XL (TOPROL-XL) 100 MG 24 hr tablet TAKE 2 TABLETS BY MOUTH EVERY DAY AT NIGHT     • montelukast (SINGULAIR) 10 MG tablet Take 10 mg by mouth Every Night.     • nitrofurantoin, macrocrystal-monohydrate, (MACROBID) 100 MG capsule Take 100 mg by mouth.     • omeprazole (prilOSEC) 10 MG capsule Take 10 mg by mouth Daily.     • ondansetron ODT (ZOFRAN-ODT) 4 MG disintegrating tablet Take 1 tablet by mouth 4 (Four) Times a Day As Needed for Nausea or Vomiting. 15 tablet 0   • ONETOUCH DELICA LANCETS FINE misc 1 each 3 (Three) Times a Day. 100 each 5   • ONETOUCH VERIO test strip Test 2-3 times daily Use as instructed 100 each 5   • oxyCODONE-acetaminophen (PERCOCET)  MG per tablet 1 TABLET AS NEEDED ORAL EVERY 6 HRS 30 DAYS     • rOPINIRole (REQUIP) 1 MG tablet Take 2 mg by mouth Every Night.     • rosuvastatin (CRESTOR) 40 MG tablet TAKE 1 TABLET BY MOUTH EVERY DAY 90 tablet 3   • sulfamethoxazole-trimethoprim (BACTRIM DS,SEPTRA DS) 800-160 MG per tablet Take 1 tablet by mouth.     • Synjardy XR 12.5-1000 MG tablet sustained-release 24 hour TAKE 2 TABLETS BY MOUTH DAILY WITH BREAKFAST. 60 tablet 6   • traZODone (DESYREL) 100 MG tablet Take 300 mg by mouth Every Night.     • triamcinolone (KENALOG) 0.5 % cream Apply  topically to the  appropriate area as directed.     • vitamin D (ERGOCALCIFEROL) 1.25 MG (06607 UT) capsule capsule Take 1 capsule by mouth 1 (One) Time Per Week. 12 capsule 1   • gabapentin (NEURONTIN) 600 MG tablet Every 8 (Eight) Hours.     • Insulin Glargine (BASAGLAR KWIKPEN) 100 UNIT/ML injection pen Inject 80 Units under the skin into the appropriate area as directed Every Morning AND 40 Units Every Evening. 105 mL 0   • Topiramate 25 MG/ML solution Take 1 tablet by mouth 2 (Two) Times a Day.     • [DISCONTINUED] Empagliflozin-metFORMIN HCl ER (Synjardy XR) 12.5-1000 MG tablet sustained-release 24 hour Take 2 tablets by mouth Daily With Breakfast.     • [DISCONTINUED] gabapentin (NEURONTIN) 100 MG capsule Take 100 mg by mouth 3 (Three) Times a Day.     • [DISCONTINUED] rOPINIRole (REQUIP) 1 MG tablet Take 2 tablets by mouth Every Night.     • [DISCONTINUED] rOPINIRole (REQUIP) 2 MG tablet Take 4 mg by mouth At Night As Needed.       No current facility-administered medications on file prior to visit.        ALLERGIES:    Allergies   Allergen Reactions   • Cortisone Other (See Comments)     TIA-POST INJECTION- KNEES   • Aspirin Buf(Cacarb-Mgcarb-Mgo) Other (See Comments)     Nose bleeds   • Augmentin [Amoxicillin-Pot Clavulanate] GI Intolerance        Social History     Socioeconomic History   • Marital status:      Spouse name: Jose   • Number of children: 1   Tobacco Use   • Smoking status: Never Smoker   • Smokeless tobacco: Never Used   Substance and Sexual Activity   • Alcohol use: Yes     Comment: RARELY   • Drug use: No   • Sexual activity: Defer        Family History   Problem Relation Age of Onset   • Hypertension Father    • Heart disease Father    • Heart attack Father    • Asthma Brother    • Colon cancer Paternal Grandfather       Review of Systems   ROS as per HPI    Objective     Vitals:    05/06/22 0915   BP: 152/80   Pulse: 68   Resp: 18   Temp: 97.1 °F (36.2 °C)   TempSrc: Temporal   SpO2: 94%  "  Weight: 120 kg (264 lb 14.4 oz)   Height: 172.7 cm (68\")  Comment: pt stated HT   PainSc: 0-No pain     Current Status 5/6/2022   ECOG score 0       Physical Exam    CONSTITUTIONAL:  Vital signs reviewed.  No distress, looks comfortable.  RESPIRATORY:  Normal respiratory effort.  Lungs clear to auscultation bilaterally.  CARDIOVASCULAR:  Normal S1, S2.  No murmurs rubs or gallops.  No significant lower extremity edema.  SKIN:  Warm.  No rashes.  PSYCHIATRIC:  Normal judgment and insight.  Normal mood and affect..    I have reexamined the patient and the results are consistent with the previously documented exam. SAVANNAH Edwards      RECENT LABS:  Results from last 7 days   Lab Units 05/06/22  0901   WBC 10*3/mm3 11.51*   NEUTROS ABS 10*3/mm3 5.80   HEMOGLOBIN g/dL 13.2   HEMATOCRIT % 40.8   PLATELETS 10*3/mm3 252     Results from last 7 days   Lab Units 05/06/22  0901   FERRITIN ng/mL 499.00*   IRON mcg/dL 77   TIBC mcg/dL 354           Assessment/Plan     *Recurrent iron deficiency anemia treated with Feraheme in the past  · Patient now complaining of significant fatigue even though her hemoglobin appears normal  · Patient denies active GI bleeding  · She did have a colonoscopy in the past and that seemed to be negative  · March 24, 2022: Reviewed anemia work-up with B12 306, ESR 50, RBC folate normal thyroid function test normal ferritin 104 and percent saturation of 13 with TIBC elevation indicating iron deficiency.  Patient has attempted oral iron in the past however this has caused her abdominal discomfort and constipation, hence she is unable to tolerate oral iron.  · 4/1/2022, iron saturation 18%, TIBC 307, ferritin 75.90.   Patient is experiencing worsening fatigue and ice cravings.    · She completed Venofer 200 mg x 5  · Reevaluation today, 5/6/2022 with hemoglobin normal at 13.2.  Improvement in her ice cravings though she remains somewhat fatigued.  She is now iron replete with a ferritin " of 500, iron saturation of 22%    *Leukocytosis, chronic  · Reviewing the records she has had intermittent leukocytosis anywhere from 11-13  · She also complained of abdominal tenderness and discomfort  · She will require complete evaluation with CT scan of the abdomen pelvis.  We will likely obtain C-reactive protein, ESR and plain x-rays of the CT of the sinuses and CT scan of the abdomen and pelvis  · JAK2 mutation negative JAK2 exon 12 - negative, BCR ABL negative  · WBC today, 5/6/2022 11.5.  Of note, the patient does currently have a urinary tract infection and is on antibiotics through her PCP which is likely exacerbating her leukocytosis    Plan:   · The patient is status post Venofer weekly x5 and is now noted to be iron replete  · Continue antibiotics as prescribed for urinary tract infection.  Again infection is likely worsening leukocytosis currently  · MD follow-up in 6 weeks with CBC, ferritin and iron profile  · Continue oral vitamin B12 1000 mcg 3 times a week.    Shea Paris, APRN  05/06/22

## 2022-05-20 LAB — REF LAB TEST METHOD: NORMAL

## 2022-06-27 ENCOUNTER — OFFICE VISIT (OUTPATIENT)
Dept: ONCOLOGY | Facility: CLINIC | Age: 62
End: 2022-06-27

## 2022-06-27 ENCOUNTER — LAB (OUTPATIENT)
Dept: LAB | Facility: HOSPITAL | Age: 62
End: 2022-06-27

## 2022-06-27 VITALS
HEART RATE: 95 BPM | HEIGHT: 68 IN | TEMPERATURE: 97.8 F | WEIGHT: 266 LBS | BODY MASS INDEX: 40.32 KG/M2 | SYSTOLIC BLOOD PRESSURE: 143 MMHG | DIASTOLIC BLOOD PRESSURE: 85 MMHG | RESPIRATION RATE: 18 BRPM | OXYGEN SATURATION: 94 %

## 2022-06-27 DIAGNOSIS — E61.1 IRON DEFICIENCY: Primary | ICD-10-CM

## 2022-06-27 DIAGNOSIS — Z12.31 SCREENING MAMMOGRAM, ENCOUNTER FOR: ICD-10-CM

## 2022-06-27 DIAGNOSIS — E61.1 IRON DEFICIENCY: ICD-10-CM

## 2022-06-27 LAB
BASOPHILS # BLD AUTO: 0.06 10*3/MM3 (ref 0–0.2)
BASOPHILS NFR BLD AUTO: 0.4 % (ref 0–1.5)
DEPRECATED RDW RBC AUTO: 44.2 FL (ref 37–54)
EOSINOPHIL # BLD AUTO: 0.19 10*3/MM3 (ref 0–0.4)
EOSINOPHIL NFR BLD AUTO: 1.4 % (ref 0.3–6.2)
ERYTHROCYTE [DISTWIDTH] IN BLOOD BY AUTOMATED COUNT: 13.3 % (ref 12.3–15.4)
FERRITIN SERPL-MCNC: 269.1 NG/ML (ref 13–150)
HBA1C MFR BLD: 10.2 % (ref 4.8–5.6)
HCT VFR BLD AUTO: 40.6 % (ref 34–46.6)
HGB BLD-MCNC: 13.4 G/DL (ref 12–15.9)
HGB RETIC QN AUTO: 33.8 PG (ref 29.8–36.1)
IMM GRANULOCYTES # BLD AUTO: 0.08 10*3/MM3 (ref 0–0.05)
IMM GRANULOCYTES NFR BLD AUTO: 0.6 % (ref 0–0.5)
IMM RETICS NFR: 11.7 % (ref 3–15.8)
IRON 24H UR-MRATE: 55 MCG/DL (ref 37–145)
IRON SATN MFR SERPL: 14 % (ref 14–48)
LYMPHOCYTES # BLD AUTO: 3.42 10*3/MM3 (ref 0.7–3.1)
LYMPHOCYTES NFR BLD AUTO: 24.3 % (ref 19.6–45.3)
MCH RBC QN AUTO: 29.7 PG (ref 26.6–33)
MCHC RBC AUTO-ENTMCNC: 33 G/DL (ref 31.5–35.7)
MCV RBC AUTO: 90 FL (ref 79–97)
MONOCYTES # BLD AUTO: 0.93 10*3/MM3 (ref 0.1–0.9)
MONOCYTES NFR BLD AUTO: 6.6 % (ref 5–12)
NEUTROPHILS NFR BLD AUTO: 66.7 % (ref 42.7–76)
NEUTROPHILS NFR BLD AUTO: 9.39 10*3/MM3 (ref 1.7–7)
NRBC BLD AUTO-RTO: 0 /100 WBC (ref 0–0.2)
PLATELET # BLD AUTO: 293 10*3/MM3 (ref 140–450)
PMV BLD AUTO: 10 FL (ref 6–12)
RBC # BLD AUTO: 4.51 10*6/MM3 (ref 3.77–5.28)
RETICS # AUTO: 0.1 10*6/MM3 (ref 0.02–0.13)
RETICS/RBC NFR AUTO: 2.21 % (ref 0.7–1.9)
TIBC SERPL-MCNC: 384 MCG/DL (ref 249–505)
TRANSFERRIN SERPL-MCNC: 274 MG/DL (ref 200–360)
WBC NRBC COR # BLD: 14.07 10*3/MM3 (ref 3.4–10.8)

## 2022-06-27 PROCEDURE — 84466 ASSAY OF TRANSFERRIN: CPT

## 2022-06-27 PROCEDURE — 83036 HEMOGLOBIN GLYCOSYLATED A1C: CPT | Performed by: INTERNAL MEDICINE

## 2022-06-27 PROCEDURE — 85046 RETICYTE/HGB CONCENTRATE: CPT

## 2022-06-27 PROCEDURE — 82728 ASSAY OF FERRITIN: CPT

## 2022-06-27 PROCEDURE — 85025 COMPLETE CBC W/AUTO DIFF WBC: CPT

## 2022-06-27 PROCEDURE — 36415 COLL VENOUS BLD VENIPUNCTURE: CPT

## 2022-06-27 PROCEDURE — 83540 ASSAY OF IRON: CPT

## 2022-06-27 PROCEDURE — 99213 OFFICE O/P EST LOW 20 MIN: CPT | Performed by: INTERNAL MEDICINE

## 2022-06-27 NOTE — PROGRESS NOTES
Subjective     REASON FOR follow-up:    · History of iron deficiency anemia recurrent s/p IV Feraheme in the past  · Leukocytosis chronic, JAK2 mutation negative, JAK2 mutation exon 12 - negative -, BCR able negative      HISTORY OF PRESENT ILLNESS:    Patient is 62-year-old female who was referred here for fatigue and was found to be iron deficient.  Patient again has iron deficiency and fatigued.  Even though her hemoglobin is normal her percent saturation is low.  And ferritin is low normal.  Apparently her insurance allows Venofer and not Injectafer.  She will also need to have referral to GI for iron deficiency anemia.    Her ferritin was 104 but her iron saturation was 13 which is very low and TIBC of 388.  Her B12 is 306 RBC folate was normal ESR was 50 which is improved TSH of 0.908 and T4 of 1.18.    Since she had leukocytosis chronically be checked JAK2 mutation which was negative and BCR able is negative.      Interval history: Patient is doing well except fatigue. .  Her ferritin is down to 269 from 429.  Iron saturation 14 with TIBC of 324.  Her hemoglobin stayed stable at 13.4 with a white count of 14.09.  She has been worked up for leukocytosis in the past and has been negative work-up.  Patient is due for a screening mammogram.  For her iron deficiency she was not referred to GI and hence we will go ahead and refer her to GI to make sure she has a negative work-up for EGD and colonoscopy.    She is s/p iron infusion Venofer completed total of 5 weeks.        Hematology history:  Patient is a 61-year-old female who has seen me in the past about 10 years ago with iron deficiency anemia and apparently received Feraheme infusions at that time.  Currently she is feeling fatigued and also has chronic leukocytosis and is here for evaluation.  She does have sinus infections and bronchitis for which she takes intermittent antibiotics.  She had to have drainage of her sinuses once.  She does complain of  abdominal discomfort with fullness of the left upper quadrant.  Certainly we will need to evaluate her iron and B12 studies given her fatigue and likely  check her thyroid functions.    Patient has not lost weight.  She has got a reasonably good appetite.  She has chronic back pain and has had surgery in the low back many years ago.    Patient's past medical history is consistent with a TIA x2, hypertension,    Patient does smoke but not significant.  We have encouraged her to quit smoking    Past Medical History:   Diagnosis Date   • Anemia    • Anxiety    • Awareness under anesthesia     X2   • Back pain    • Bulging lumbar disc    • Clostridium difficile colitis 2005   • DDD (degenerative disc disease), lumbar    • Depression    • Dizziness    • Elevated blood sugar level     PT STATES THIS WAS AFTER RECEIVING CORTISONE INJECTIONS IN KNEES   • Frequent UTI     CURRENTLY   • GERD (gastroesophageal reflux disease)    • High cholesterol    • History of anemia    • History of blood transfusion 2016   • History of blood transfusion    • History of Clostridium difficile 2012   • History of snoring    • History of TIAs     LAST ONE 2 YRS AGO   • Hypertension    • IBS (irritable bowel syndrome)    • Infectious mononucleosis    • Left ankle pain    • Leg pain, right    • On home oxygen therapy     2-3L NC AT NIGHT   • HECTOR (obstructive sleep apnea)    • Pancreas disorder     PT TAKES VICTOZA INJ    • PVC's (premature ventricular contractions)    • Restless leg syndrome    • Type 2 diabetes mellitus (HCC)         Past Surgical History:   Procedure Laterality Date   • ANKLE SURGERY Right    • BACK SURGERY     • FEMUR FRACTURE SURGERY     • HYSTERECTOMY     • KNEE ARTHROPLASTY Right    • NASAL SINUS SURGERY      X3   • AK REVISE KNEE JOINT REPLACE,1 PART Right 8/15/2016    Procedure: RT TOTAL KNEE ARTHROPLASTY REVISION;  Surgeon: Alcides Ramirez MD;  Location: [facility];  Service: Orthopedics        Current Outpatient  Medications on File Prior to Visit   Medication Sig Dispense Refill   • albuterol sulfate  (90 Base) MCG/ACT inhaler Inhale 2 puffs.     • amLODIPine (NORVASC) 10 MG tablet TAKE 1 TABLET BY MOUTH EVERY DAY AT NIGHT     • Armodafinil (NUVIGIL PO) Take 250 mg by mouth every morning.     • celecoxib (CeleBREX) 200 MG capsule TAKE 1 CAPSULE BY MOUTH TWICE DAILY AS NEEDED FOR PAIN     • clopidogrel (PLAVIX) 75 MG tablet Take 75 mg by mouth daily.     • Diclofenac Sodium (VOLTAREN) 1 % gel gel APPLY 2-4 GRAMS TO THE AFFECTED AREA 1-2 TIMES PER DAY     • Dulaglutide (Trulicity) 3 MG/0.5ML solution pen-injector Inject 3 mg under the skin into the appropriate area as directed 1 (One) Time Per Week. 6 mL 1   • DULoxetine (CYMBALTA) 60 MG capsule TAKE 1 CAPSULE BY MOUTH TWICE DAILY     • furosemide (LASIX) 20 MG tablet Take 1 tablet by mouth Daily As Needed.     • gabapentin (NEURONTIN) 600 MG tablet Take 600 mg by mouth 3 (Three) Times a Day.     • gabapentin (NEURONTIN) 600 MG tablet Every 8 (Eight) Hours.     • Hyoscyamine Sulfate (LEVBID PO) Take 1.25 mg by mouth daily as needed.     • Insulin Pen Needle 32G X 4 MM misc Use as directed twice daily 100 each PRN   • lisinopril (PRINIVIL,ZESTRIL) 40 MG tablet Take 40 mg by mouth Daily.     • methocarbamol (ROBAXIN) 500 MG tablet Take 1-2 tablets by mouth Every 8 (Eight) Hours As Needed.     • metoprolol succinate XL (TOPROL-XL) 100 MG 24 hr tablet TAKE 2 TABLETS BY MOUTH EVERY DAY AT NIGHT     • montelukast (SINGULAIR) 10 MG tablet Take 10 mg by mouth Every Night.     • omeprazole (prilOSEC) 10 MG capsule Take 10 mg by mouth Daily.     • ondansetron ODT (ZOFRAN-ODT) 4 MG disintegrating tablet Take 1 tablet by mouth 4 (Four) Times a Day As Needed for Nausea or Vomiting. 15 tablet 0   • ONETOUCH DELICA LANCETS FINE misc 1 each 3 (Three) Times a Day. 100 each 5   • ONETOUCH VERIO test strip Test 2-3 times daily Use as instructed 100 each 5   • oxyCODONE-acetaminophen  (PERCOCET)  MG per tablet 1 TABLET AS NEEDED ORAL EVERY 6 HRS 30 DAYS     • rOPINIRole (REQUIP) 1 MG tablet Take 2 mg by mouth Every Night.     • rosuvastatin (CRESTOR) 40 MG tablet TAKE 1 TABLET BY MOUTH EVERY DAY 90 tablet 3   • Synjardy XR 12.5-1000 MG tablet sustained-release 24 hour TAKE 2 TABLETS BY MOUTH DAILY WITH BREAKFAST. 60 tablet 6   • traZODone (DESYREL) 100 MG tablet Take 300 mg by mouth Every Night.     • triamcinolone (KENALOG) 0.5 % cream Apply  topically to the appropriate area as directed.     • vitamin D (ERGOCALCIFEROL) 1.25 MG (01026 UT) capsule capsule Take 1 capsule by mouth 1 (One) Time Per Week. 12 capsule 1   • Insulin Glargine (BASAGLAR KWIKPEN) 100 UNIT/ML injection pen Inject 80 Units under the skin into the appropriate area as directed Every Morning AND 40 Units Every Evening. 105 mL 0   • Topiramate 25 MG/ML solution Take 1 tablet by mouth 2 (Two) Times a Day.       No current facility-administered medications on file prior to visit.        ALLERGIES:    Allergies   Allergen Reactions   • Cortisone Other (See Comments)     TIA-POST INJECTION- KNEES   • Aspirin Buf(Cacarb-Mgcarb-Mgo) Other (See Comments)     Nose bleeds   • Augmentin [Amoxicillin-Pot Clavulanate] GI Intolerance        Social History     Socioeconomic History   • Marital status:      Spouse name: Jose   • Number of children: 1   Tobacco Use   • Smoking status: Never Smoker   • Smokeless tobacco: Never Used   Substance and Sexual Activity   • Alcohol use: Yes     Comment: RARELY   • Drug use: No   • Sexual activity: Defer        Family History   Problem Relation Age of Onset   • Hypertension Father    • Heart disease Father    • Heart attack Father    • Asthma Brother    • Colon cancer Paternal Grandfather         Review of Systems   Constitutional: Negative for appetite change, chills, diaphoresis, fatigue, fever and unexpected weight change.   HENT: Negative for hearing loss, sore throat and trouble  "swallowing.    Respiratory: Negative for cough, chest tightness, shortness of breath and wheezing.    Cardiovascular: Negative for chest pain, palpitations and leg swelling.   Gastrointestinal: Negative for abdominal distention, abdominal pain, constipation, diarrhea, nausea and vomiting.   Genitourinary: Negative for dysuria, frequency, hematuria and urgency.   Musculoskeletal: Negative for joint swelling.        No muscle weakness.   Skin: Negative for rash and wound.   Neurological: Negative for seizures, syncope, speech difficulty, weakness, numbness and headaches.   Hematological: Negative for adenopathy. Does not bruise/bleed easily.   Psychiatric/Behavioral: Negative for behavioral problems, confusion and suicidal ideas.   All other systems reviewed and are negative.       Objective     Vitals:    06/27/22 0931   BP: 143/85   Pulse: 95   Resp: 18   Temp: 97.8 °F (36.6 °C)   TempSrc: Temporal   SpO2: 94%   Weight: 121 kg (266 lb)   Height: 172 cm (67.72\")   PainSc: 0-No pain     Current Status 6/27/2022   ECOG score 0       Physical Exam      CONSTITUTIONAL:  Vital signs reviewed.  No distress, looks comfortable.  EYES:  Conjunctivae and lids unremarkable.  PERRLA  EARS,NOSE,MOUTH,THROAT:  Ears and nose appear unremarkable.  Lips, teeth, gums appear unremarkable.  RESPIRATORY:  Normal respiratory effort.  Lungs clear to auscultation bilaterally.  CARDIOVASCULAR:  Normal S1, S2.  No murmurs rubs or gallops.  No significant lower extremity edema.  GASTROINTESTINAL: Abdomen appears unremarkable.  Nontender.  No hepatomegaly.  No splenomegaly.  LYMPHATIC:  No cervical, supraclavicular, axillary lymphadenopathy.  SKIN:  Warm.  No rashes.  PSYCHIATRIC:  Normal judgment and insight.  Normal mood and affect.    RECENT LABS:  Hematology WBC   Date Value Ref Range Status   06/27/2022 14.07 (H) 3.40 - 10.80 10*3/mm3 Final   08/17/2020 12.34 (H) 4.5 - 11.0 10*3/uL Final     RBC   Date Value Ref Range Status   06/27/2022 " 4.51 3.77 - 5.28 10*6/mm3 Final   08/17/2020 4.78 4.0 - 5.2 10*6/uL Final     Hemoglobin   Date Value Ref Range Status   06/27/2022 13.4 12.0 - 15.9 g/dL Final   08/17/2020 13.2 12.0 - 16.0 g/dL Final     Hematocrit   Date Value Ref Range Status   06/27/2022 40.6 34.0 - 46.6 % Final   08/17/2020 43.0 36.0 - 46.0 % Final     Platelets   Date Value Ref Range Status   06/27/2022 293 140 - 450 10*3/mm3 Final   08/17/2020 326 140 - 440 10*3/uL Final          Assessment & Plan     *History of recurrent iron deficiency anemia treated with Feraheme in the past  · Patient now complaining of significant fatigue even though her hemoglobin appears normal  · Patient denies active GI bleeding  · She did have a colonoscopy in the past and that seemed to be negative  · March 24, 2022: Reviewed anemia work-up with B12 306, ESR 50, RBC folate normal thyroid function test normal ferritin 104 and percent saturation of 13 with TIBC elevation indicating iron deficiency  · Will refer to GI and start IV Venofer  · S/p IV Venofer x5 last treatment April 25, 2022  · Patient needs GI referral for EGD colonoscopy given that she was iron deficient    *Leukocytosis, chronic  · Reviewing the records she has had intermittent leukocytosis anywhere from 11-13  · She also complained of abdominal tenderness and discomfort  · She will require complete evaluation with CT scan of the abdomen pelvis.  We will likely obtain C-reactive protein, ESR and plain x-rays of the CT of the sinuses and CT scan of the abdomen and pelvis  · JAK2 mutation negative JAK2 exon 12 - negative, BCR see able negative negative  · 2/27/2022: Hemoglobin 13.4 but white count is 14.01.  Chronic leukocytosis stable    Plan  · S/p IV Venofer x5 doses weekly last dose April 29, 2022  · Today reviewed the iron labs and they are normal  · However given iron deficiency we will do need to refer to GI for EGD colonoscopy  · Leukocytosis is stable  · Patient is overdue for screening  mammogram and will schedule.  · Follow-up with nurse practitioner in 3 months with labs and with me in 6 months with labs      Debby Avilez MD

## 2022-06-28 PROBLEM — Z12.31 SCREENING MAMMOGRAM, ENCOUNTER FOR: Status: ACTIVE | Noted: 2022-06-28

## 2022-07-05 ENCOUNTER — OFFICE VISIT (OUTPATIENT)
Dept: ENDOCRINOLOGY | Age: 62
End: 2022-07-05

## 2022-07-05 VITALS
HEART RATE: 110 BPM | SYSTOLIC BLOOD PRESSURE: 140 MMHG | HEIGHT: 68 IN | DIASTOLIC BLOOD PRESSURE: 72 MMHG | TEMPERATURE: 97.3 F | WEIGHT: 259 LBS | OXYGEN SATURATION: 96 % | BODY MASS INDEX: 39.25 KG/M2

## 2022-07-05 DIAGNOSIS — E11.69 HYPERLIPIDEMIA ASSOCIATED WITH TYPE 2 DIABETES MELLITUS: ICD-10-CM

## 2022-07-05 DIAGNOSIS — E78.5 HYPERLIPIDEMIA ASSOCIATED WITH TYPE 2 DIABETES MELLITUS: ICD-10-CM

## 2022-07-05 DIAGNOSIS — Z79.4 LONG-TERM INSULIN USE: ICD-10-CM

## 2022-07-05 DIAGNOSIS — E11.65 TYPE 2 DIABETES MELLITUS WITH HYPERGLYCEMIA, WITH LONG-TERM CURRENT USE OF INSULIN: Primary | ICD-10-CM

## 2022-07-05 DIAGNOSIS — Z79.4 TYPE 2 DIABETES MELLITUS WITH HYPERGLYCEMIA, WITH LONG-TERM CURRENT USE OF INSULIN: Primary | ICD-10-CM

## 2022-07-05 PROCEDURE — 99214 OFFICE O/P EST MOD 30 MIN: CPT | Performed by: INTERNAL MEDICINE

## 2022-07-05 RX ORDER — DULAGLUTIDE 4.5 MG/.5ML
4.5 INJECTION, SOLUTION SUBCUTANEOUS WEEKLY
Qty: 6 ML | Refills: 2 | Status: SHIPPED | OUTPATIENT
Start: 2022-07-05

## 2022-07-05 RX ORDER — INSULIN GLARGINE 300 U/ML
INJECTION, SOLUTION SUBCUTANEOUS
Qty: 6 ML | Refills: 2 | Status: SHIPPED | OUTPATIENT
Start: 2022-07-05 | End: 2022-08-22

## 2022-07-05 NOTE — PROGRESS NOTES
"Chief Complaint  Chief Complaint   Patient presents with   • Diabetes     Type 2       Subjective          History of Present Illness    Ashley Malone 62 y.o. presents with Type 2 dm as a F/u patient.     Type 2 dm - Diagnosed about 10 years ago.   Today in clinic pt reports being on trulicity - 3 mg subq once a week, synjardy ER - 2 tabs a day - but she reports that due the size she is missing the dose at times.   Not on basaglar - 80 units in the before starting work.   For about 6 weeks has bene on insulin due to scheduling issues. Still working night shifts.   FBG -   Pre meals -   Checks BG - not checking  Sensor - x  Dm retinopathy -x ,Last eye exam - uptodate with exam  Dm nephropathy -x   Dm neuropathy - yes,Dm neuropathy meds - n/a  CAD -x  CVA -x  Episodes of hypoglycemia - x  Pt is physically active. weight has been stable.   Pt tries to follow DM diet for most part.       Reviewed primary care physician's/consulting physician documentation and lab results         I have reviewed the patient's allergies, medicines, past medical hx, family hx and social hx in detail.    Objective   Vital Signs:   /72   Pulse 110   Temp 97.3 °F (36.3 °C)   Ht 172 cm (67.72\")   Wt 117 kg (259 lb)   SpO2 96%   BMI 39.71 kg/m²   Physical Exam   General appearance - no distress  Eyes- anicteric sclera  Ear nose and throat-external ears and nose normal.    Respiratory-normal chest on inspection.  No respiratory distress noted.  Skin-no rashes.  Neuro-alert and oriented x3            Result Review :   The following data was reviewed by: Lisa King MD on 07/05/2022:  Lab on 06/27/2022   Component Date Value Ref Range Status   • Immature Reticulocyte Fraction 06/27/2022 11.7  3.0 - 15.8 % Final   • Reticulocyte % 06/27/2022 2.21 (A) 0.70 - 1.90 % Final   • Reticulocyte Absolute 06/27/2022 0.0997  0.0200 - 0.1300 10*6/mm3 Final   • Reticulocyte Hgb 06/27/2022 33.8  29.8 - 36.1 pg Final   • Ferritin 06/27/2022 " 269.10 (A) 13.00 - 150.00 ng/mL Final   • Iron 06/27/2022 55  37 - 145 mcg/dL Final   • Iron Saturation 06/27/2022 14  14 - 48 % Final   • Transferrin 06/27/2022 274  200 - 360 mg/dL Final   • TIBC 06/27/2022 384  249 - 505 mcg/dL Final   • WBC 06/27/2022 14.07 (A) 3.40 - 10.80 10*3/mm3 Final   • RBC 06/27/2022 4.51  3.77 - 5.28 10*6/mm3 Final   • Hemoglobin 06/27/2022 13.4  12.0 - 15.9 g/dL Final   • Hematocrit 06/27/2022 40.6  34.0 - 46.6 % Final   • MCV 06/27/2022 90.0  79.0 - 97.0 fL Final   • MCH 06/27/2022 29.7  26.6 - 33.0 pg Final   • MCHC 06/27/2022 33.0  31.5 - 35.7 g/dL Final   • RDW 06/27/2022 13.3  12.3 - 15.4 % Final   • RDW-SD 06/27/2022 44.2  37.0 - 54.0 fl Final   • MPV 06/27/2022 10.0  6.0 - 12.0 fL Final   • Platelets 06/27/2022 293  140 - 450 10*3/mm3 Final   • Neutrophil % 06/27/2022 66.7  42.7 - 76.0 % Final   • Lymphocyte % 06/27/2022 24.3  19.6 - 45.3 % Final   • Monocyte % 06/27/2022 6.6  5.0 - 12.0 % Final   • Eosinophil % 06/27/2022 1.4  0.3 - 6.2 % Final   • Basophil % 06/27/2022 0.4  0.0 - 1.5 % Final   • Immature Grans % 06/27/2022 0.6 (A) 0.0 - 0.5 % Final   • Neutrophils, Absolute 06/27/2022 9.39 (A) 1.70 - 7.00 10*3/mm3 Final   • Lymphocytes, Absolute 06/27/2022 3.42 (A) 0.70 - 3.10 10*3/mm3 Final   • Monocytes, Absolute 06/27/2022 0.93 (A) 0.10 - 0.90 10*3/mm3 Final   • Eosinophils, Absolute 06/27/2022 0.19  0.00 - 0.40 10*3/mm3 Final   • Basophils, Absolute 06/27/2022 0.06  0.00 - 0.20 10*3/mm3 Final   • Immature Grans, Absolute 06/27/2022 0.08 (A) 0.00 - 0.05 10*3/mm3 Final   • nRBC 06/27/2022 0.0  0.0 - 0.2 /100 WBC Final   • Hemoglobin A1C 06/27/2022 10.20 (A) 4.80 - 5.60 % Final     Data reviewed: PCP notes       Results Review:    I reviewed the patient's new clinical results.     Assessment and Plan    Problem List Items Addressed This Visit    None     Visit Diagnoses     Type 2 diabetes mellitus with hyperglycemia, with long-term current use of insulin (HCC)    -  Primary  "   Relevant Medications    Dulaglutide (Trulicity) 4.5 MG/0.5ML solution pen-injector    Insulin Glargine, 2 Unit Dial, (Toujeo Max SoloStar) 300 UNIT/ML solution pen-injector injection    Long-term insulin use (HCC)        Hyperlipidemia associated with type 2 diabetes mellitus (HCC)        Relevant Medications    Dulaglutide (Trulicity) 4.5 MG/0.5ML solution pen-injector    Insulin Glargine, 2 Unit Dial, (Toujeo Max SoloStar) 300 UNIT/ML solution pen-injector injection        Type 2 dm - uncontrolled with hyperglycemia  Hba1c - worse   Start Toujeo 80 units daily.   increase trulicity - 4.5 mg subq once a week.   Continue synjardy but emphasized the pt to be compliant with the medication.     Hyperlipidemia -   Continue crestor 40 mg po daily.     Interpreted the blood work-up/imaging results performed by the primary care/consulting physician -    Refills sent to pharmacy    Follow Up     Patient was given instructions and counseling regarding her condition or for health maintenance advice. Please see specific information pulled into the AVS if appropriate.       Thank you for asking me to see your patient, Ashley Malone in consultation.         Lisa King MD  07/05/22      [facility] Dragon / transcription disclaimer:     \"Dictated utilizing Dragon dictation\".         "

## 2022-07-25 ENCOUNTER — TELEPHONE (OUTPATIENT)
Dept: ONCOLOGY | Facility: OTHER | Age: 62
End: 2022-07-25

## 2022-08-22 RX ORDER — INSULIN GLARGINE 300 U/ML
INJECTION, SOLUTION SUBCUTANEOUS
Qty: 6 ML | Refills: 2 | Status: SHIPPED | OUTPATIENT
Start: 2022-08-22 | End: 2022-12-14 | Stop reason: SDUPTHER

## 2022-09-01 ENCOUNTER — OFFICE VISIT (OUTPATIENT)
Dept: GASTROENTEROLOGY | Facility: CLINIC | Age: 62
End: 2022-09-01

## 2022-09-01 VITALS
DIASTOLIC BLOOD PRESSURE: 83 MMHG | HEIGHT: 68 IN | BODY MASS INDEX: 40.01 KG/M2 | HEART RATE: 92 BPM | WEIGHT: 264 LBS | SYSTOLIC BLOOD PRESSURE: 151 MMHG | TEMPERATURE: 97.5 F

## 2022-09-01 DIAGNOSIS — K21.00 GASTROESOPHAGEAL REFLUX DISEASE WITH ESOPHAGITIS WITHOUT HEMORRHAGE: ICD-10-CM

## 2022-09-01 DIAGNOSIS — D50.0 IRON DEFICIENCY ANEMIA DUE TO CHRONIC BLOOD LOSS: Primary | ICD-10-CM

## 2022-09-01 PROCEDURE — 99204 OFFICE O/P NEW MOD 45 MIN: CPT | Performed by: INTERNAL MEDICINE

## 2022-09-01 NOTE — PROGRESS NOTES
Chief Complaint   Patient presents with   • Anemia   • Constipation       History of Present Illness:   62 y.o. female sent for evaluation of iron def anemia. Last c/s 5-6 yrs ago at : hemorrhoids seen. Some rectal bleeding when consitpaited. No meleena. No diarrhea. No abddominal or chest pain. Some heartburn. No dysphagia. On celebrex and percocet (for nerve damage from femur fracture). On plavix.    Past Medical History:   Diagnosis Date   • Anemia    • Anxiety    • Awareness under anesthesia     X2   • Back pain    • Bulging lumbar disc    • Clostridium difficile colitis 2005   • DDD (degenerative disc disease), lumbar    • Depression    • Dizziness    • Elevated blood sugar level     PT STATES THIS WAS AFTER RECEIVING CORTISONE INJECTIONS IN KNEES   • Frequent UTI     CURRENTLY   • GERD (gastroesophageal reflux disease)    • High cholesterol    • History of anemia    • History of blood transfusion 2016   • History of blood transfusion    • History of Clostridium difficile 2012   • History of snoring    • History of TIAs     LAST ONE 2 YRS AGO   • Hypertension    • IBS (irritable bowel syndrome)    • Infectious mononucleosis    • Left ankle pain    • Leg pain, right    • On home oxygen therapy     2-3L NC AT NIGHT   • HECTOR (obstructive sleep apnea)    • Pancreas disorder     PT TAKES VICTOZA INJ    • PVC's (premature ventricular contractions)    • Restless leg syndrome    • Type 2 diabetes mellitus (HCC)        Past Surgical History:   Procedure Laterality Date   • ANKLE SURGERY Right    • BACK SURGERY     • FEMUR FRACTURE SURGERY     • HYSTERECTOMY     • KNEE ARTHROPLASTY Right    • NASAL SINUS SURGERY      X3   • HI REVISE KNEE JOINT REPLACE,1 PART Right 8/15/2016    Procedure: RT TOTAL KNEE ARTHROPLASTY REVISION;  Surgeon: Alcides Ramirez MD;  Location: [facility];  Service: Orthopedics         Current Outpatient Medications:   •  albuterol sulfate  (90 Base) MCG/ACT inhaler, Inhale 2 puffs., Disp:  , Rfl:   •  amLODIPine (NORVASC) 10 MG tablet, TAKE 1 TABLET BY MOUTH EVERY DAY AT NIGHT, Disp: , Rfl:   •  Armodafinil (NUVIGIL PO), Take 250 mg by mouth every morning., Disp: , Rfl:   •  celecoxib (CeleBREX) 200 MG capsule, TAKE 1 CAPSULE BY MOUTH TWICE DAILY AS NEEDED FOR PAIN, Disp: , Rfl:   •  clopidogrel (PLAVIX) 75 MG tablet, Take 75 mg by mouth daily., Disp: , Rfl:   •  Continuous Blood Gluc  (FreeStyle Alex 2 Gilliam) device, 1 Device Daily., Disp: 2 each, Rfl: 1  •  Continuous Blood Gluc Sensor (FreeStyle Alex 2 Sensor) misc, 1 Device Every 14 (Fourteen) Days., Disp: 2 each, Rfl: 3  •  Diclofenac Sodium (VOLTAREN) 1 % gel gel, APPLY 2-4 GRAMS TO THE AFFECTED AREA 1-2 TIMES PER DAY, Disp: , Rfl:   •  Dulaglutide (Trulicity) 4.5 MG/0.5ML solution pen-injector, Inject 0.5 mL under the skin into the appropriate area as directed 1 (One) Time Per Week., Disp: 6 mL, Rfl: 2  •  DULoxetine (CYMBALTA) 60 MG capsule, TAKE 1 CAPSULE BY MOUTH TWICE DAILY, Disp: , Rfl:   •  furosemide (LASIX) 20 MG tablet, Take 1 tablet by mouth Daily As Needed., Disp: , Rfl:   •  gabapentin (NEURONTIN) 600 MG tablet, Take 600 mg by mouth 3 (Three) Times a Day., Disp: , Rfl:   •  Hyoscyamine Sulfate (LEVBID PO), Take 1.25 mg by mouth daily as needed., Disp: , Rfl:   •  Insulin Glargine, 2 Unit Dial, (Toujeo Max SoloStar) 300 UNIT/ML solution pen-injector injection, INJECT 80 UNITS DAILY (40-DAY SUPPLY), Disp: 6 mL, Rfl: 2  •  Insulin Pen Needle 32G X 4 MM misc, Use as directed twice daily, Disp: 100 each, Rfl: PRN  •  lisinopril (PRINIVIL,ZESTRIL) 40 MG tablet, Take 40 mg by mouth Daily., Disp: , Rfl:   •  methocarbamol (ROBAXIN) 500 MG tablet, Take 1-2 tablets by mouth Every 8 (Eight) Hours As Needed., Disp: , Rfl:   •  metoprolol succinate XL (TOPROL-XL) 100 MG 24 hr tablet, TAKE 2 TABLETS BY MOUTH EVERY DAY AT NIGHT, Disp: , Rfl:   •  montelukast (SINGULAIR) 10 MG tablet, Take 10 mg by mouth Every Night., Disp: , Rfl:   •   omeprazole (prilOSEC) 10 MG capsule, Take 10 mg by mouth Daily., Disp: , Rfl:   •  ondansetron ODT (ZOFRAN-ODT) 4 MG disintegrating tablet, Take 1 tablet by mouth 4 (Four) Times a Day As Needed for Nausea or Vomiting., Disp: 15 tablet, Rfl: 0  •  ONETOUCH DELICA LANCETS FINE misc, 1 each 3 (Three) Times a Day., Disp: 100 each, Rfl: 5  •  ONETOUCH VERIO test strip, Test 2-3 times daily Use as instructed, Disp: 100 each, Rfl: 5  •  oxyCODONE-acetaminophen (PERCOCET)  MG per tablet, 1 TABLET AS NEEDED ORAL EVERY 6 HRS 30 DAYS, Disp: , Rfl:   •  rOPINIRole (REQUIP) 1 MG tablet, Take 2 mg by mouth Every Night., Disp: , Rfl:   •  rosuvastatin (CRESTOR) 40 MG tablet, TAKE 1 TABLET BY MOUTH EVERY DAY, Disp: 90 tablet, Rfl: 3  •  Synjardy XR 12.5-1000 MG tablet sustained-release 24 hour, TAKE 2 TABLETS BY MOUTH DAILY WITH BREAKFAST., Disp: 60 tablet, Rfl: 6  •  traZODone (DESYREL) 100 MG tablet, Take 300 mg by mouth Every Night., Disp: , Rfl:   •  triamcinolone (KENALOG) 0.5 % cream, Apply  topically to the appropriate area as directed., Disp: , Rfl:   •  vitamin D (ERGOCALCIFEROL) 1.25 MG (29167 UT) capsule capsule, Take 1 capsule by mouth 1 (One) Time Per Week., Disp: 12 capsule, Rfl: 1  •  Topiramate 25 MG/ML solution, Take 1 tablet by mouth 2 (Two) Times a Day., Disp: , Rfl:     Allergies   Allergen Reactions   • Cortisone Other (See Comments)     TIA-POST INJECTION- KNEES   • Aspirin Buf(Cacarb-Mgcarb-Mgo) Other (See Comments)     Nose bleeds   • Augmentin [Amoxicillin-Pot Clavulanate] GI Intolerance       Family History   Problem Relation Age of Onset   • Hypertension Father    • Heart disease Father    • Heart attack Father    • Asthma Brother    • Colon cancer Paternal Grandfather        Social History     Socioeconomic History   • Marital status:      Spouse name: Jose   • Number of children: 1   Tobacco Use   • Smoking status: Never Smoker   • Smokeless tobacco: Never Used   Substance and Sexual  Activity   • Alcohol use: Not Currently     Alcohol/week: 1.0 standard drink     Types: 1 Cans of beer per week     Comment: RARELY   • Drug use: No   • Sexual activity: Not Currently     Partners: Male     Birth control/protection: Post-menopausal       Review of Systems   Gastrointestinal: Negative for abdominal pain.   All other systems reviewed and are negative.    Pertinent positives and negatives documented in the HPI and all other systems reviewed and were found to be negative.  Vitals:    09/01/22 0930   BP: 151/83   Pulse: 92   Temp: 97.5 °F (36.4 °C)       Physical Exam  Vitals reviewed.   Constitutional:       General: She is not in acute distress.     Appearance: Normal appearance. She is well-developed. She is not diaphoretic.   HENT:      Head: Normocephalic and atraumatic. Hair is normal.      Right Ear: Hearing, tympanic membrane, ear canal and external ear normal. No decreased hearing noted. No drainage.      Left Ear: Hearing, tympanic membrane, ear canal and external ear normal. No decreased hearing noted.      Nose: Nose normal. No nasal deformity.      Mouth/Throat:      Mouth: Mucous membranes are moist.   Eyes:      General: Lids are normal.         Right eye: No discharge.         Left eye: No discharge.      Extraocular Movements: Extraocular movements intact.      Conjunctiva/sclera: Conjunctivae normal.      Pupils: Pupils are equal, round, and reactive to light.   Neck:      Thyroid: No thyromegaly.      Vascular: No JVD.      Trachea: No tracheal deviation.   Cardiovascular:      Rate and Rhythm: Normal rate and regular rhythm.      Pulses: Normal pulses.      Heart sounds: Normal heart sounds. No murmur heard.    No friction rub. No gallop.   Pulmonary:      Effort: Pulmonary effort is normal. No respiratory distress.      Breath sounds: Normal breath sounds. No wheezing or rales.   Chest:      Chest wall: No tenderness.   Abdominal:      General: Bowel sounds are normal. There is no  distension.      Palpations: Abdomen is soft. There is no mass.      Tenderness: There is no abdominal tenderness. There is no guarding or rebound.      Hernia: No hernia is present.   Genitourinary:     Rectum: Normal. Guaiac result negative.   Musculoskeletal:         General: No tenderness or deformity. Normal range of motion.      Cervical back: Normal range of motion and neck supple.   Lymphadenopathy:      Cervical: No cervical adenopathy.   Skin:     General: Skin is warm and dry.      Findings: No erythema or rash.   Neurological:      Mental Status: She is alert and oriented to person, place, and time.      Cranial Nerves: No cranial nerve deficit.      Motor: No abnormal muscle tone.      Coordination: Coordination normal.      Deep Tendon Reflexes: Reflexes are normal and symmetric. Reflexes normal.   Psychiatric:         Mood and Affect: Mood normal.         Behavior: Behavior normal.         Thought Content: Thought content normal.         Judgment: Judgment normal.         Diagnoses and all orders for this visit:    1. Iron deficiency anemia due to chronic blood loss (Primary)  -     Case Request; Standing  -     Case Request    2. Gastroesophageal reflux disease with esophagitis without hemorrhage  -     Case Request; Standing  -     Case Request    Other orders  -     Follow Anesthesia Guidelines / Standing Orders; Future  -     Obtain Informed Consent; Future      Assessment:  1. On Plavix for TIA's  2. Iron def anemia  3. GERD  4.     Recommendations:  1. EGD and colonoscopy. She needs to be off of Plavix for 5 days prior to the scopes.   2.     No follow-ups on file.    Samir Vázquez MD  9/1/2022

## 2022-10-12 RX ORDER — ERGOCALCIFEROL 1.25 MG/1
CAPSULE ORAL
Qty: 12 CAPSULE | Refills: 0 | Status: SHIPPED | OUTPATIENT
Start: 2022-10-12 | End: 2023-02-15

## 2022-10-24 ENCOUNTER — TELEPHONE (OUTPATIENT)
Dept: ENDOCRINOLOGY | Age: 62
End: 2022-10-24

## 2022-10-24 DIAGNOSIS — E11.65 TYPE 2 DIABETES MELLITUS WITH HYPERGLYCEMIA, WITH LONG-TERM CURRENT USE OF INSULIN: Primary | ICD-10-CM

## 2022-10-24 DIAGNOSIS — Z79.4 TYPE 2 DIABETES MELLITUS WITH HYPERGLYCEMIA, WITH LONG-TERM CURRENT USE OF INSULIN: Primary | ICD-10-CM

## 2022-11-16 ENCOUNTER — TELEPHONE (OUTPATIENT)
Dept: GASTROENTEROLOGY | Facility: CLINIC | Age: 62
End: 2022-11-16

## 2022-11-29 ENCOUNTER — TELEPHONE (OUTPATIENT)
Dept: GASTROENTEROLOGY | Facility: CLINIC | Age: 62
End: 2022-11-29

## 2022-12-12 ENCOUNTER — TELEPHONE (OUTPATIENT)
Dept: ONCOLOGY | Facility: CLINIC | Age: 62
End: 2022-12-12

## 2022-12-12 NOTE — TELEPHONE ENCOUNTER
Caller: Ashley Malone    Relationship to patient: Self    Best call back number: [phone]    Type of visit: LAB & F/ U 1     Requested date: PREFERS AROUND 9, YOU CAN MAKE APPT SHE WILL SEE ON MYCHART    If rescheduling, when is the original appointment: 12/13/2022

## 2022-12-14 ENCOUNTER — LAB (OUTPATIENT)
Dept: LAB | Facility: HOSPITAL | Age: 62
End: 2022-12-14

## 2022-12-14 ENCOUNTER — OFFICE VISIT (OUTPATIENT)
Dept: ONCOLOGY | Facility: CLINIC | Age: 62
End: 2022-12-14

## 2022-12-14 ENCOUNTER — TELEPHONE (OUTPATIENT)
Dept: ENDOCRINOLOGY | Age: 62
End: 2022-12-14

## 2022-12-14 VITALS
WEIGHT: 264.7 LBS | OXYGEN SATURATION: 98 % | RESPIRATION RATE: 18 BRPM | TEMPERATURE: 98.4 F | BODY MASS INDEX: 40.12 KG/M2 | HEART RATE: 83 BPM | HEIGHT: 68 IN | SYSTOLIC BLOOD PRESSURE: 145 MMHG | DIASTOLIC BLOOD PRESSURE: 76 MMHG

## 2022-12-14 DIAGNOSIS — E61.1 IRON DEFICIENCY: Primary | ICD-10-CM

## 2022-12-14 DIAGNOSIS — D72.829 LEUKOCYTOSIS, UNSPECIFIED TYPE: ICD-10-CM

## 2022-12-14 DIAGNOSIS — D50.9 IRON DEFICIENCY ANEMIA, UNSPECIFIED IRON DEFICIENCY ANEMIA TYPE: ICD-10-CM

## 2022-12-14 DIAGNOSIS — E11.65 UNCONTROLLED TYPE 2 DIABETES MELLITUS WITH HYPERGLYCEMIA: ICD-10-CM

## 2022-12-14 DIAGNOSIS — E61.1 IRON DEFICIENCY: ICD-10-CM

## 2022-12-14 LAB
ALBUMIN SERPL-MCNC: 4.6 G/DL (ref 3.5–5.2)
ALBUMIN/GLOB SERPL: 1.4 G/DL (ref 1.1–2.4)
ALP SERPL-CCNC: 106 U/L (ref 38–116)
ALT SERPL W P-5'-P-CCNC: 11 U/L (ref 0–33)
ANION GAP SERPL CALCULATED.3IONS-SCNC: 13.8 MMOL/L (ref 5–15)
AST SERPL-CCNC: 11 U/L (ref 0–32)
BASOPHILS # BLD AUTO: 0.05 10*3/MM3 (ref 0–0.2)
BASOPHILS NFR BLD AUTO: 0.5 % (ref 0–1.5)
BILIRUB SERPL-MCNC: 0.3 MG/DL (ref 0.2–1.2)
BUN SERPL-MCNC: 8 MG/DL (ref 6–20)
BUN/CREAT SERPL: 12.7 (ref 7.3–30)
CALCIUM SPEC-SCNC: 10.3 MG/DL (ref 8.5–10.2)
CHLORIDE SERPL-SCNC: 92 MMOL/L (ref 98–107)
CO2 SERPL-SCNC: 29.2 MMOL/L (ref 22–29)
CREAT SERPL-MCNC: 0.63 MG/DL (ref 0.6–1.1)
DEPRECATED RDW RBC AUTO: 39.9 FL (ref 37–54)
EGFRCR SERPLBLD CKD-EPI 2021: 101.1 ML/MIN/1.73
EOSINOPHIL # BLD AUTO: 0.22 10*3/MM3 (ref 0–0.4)
EOSINOPHIL NFR BLD AUTO: 2 % (ref 0.3–6.2)
ERYTHROCYTE [DISTWIDTH] IN BLOOD BY AUTOMATED COUNT: 12.1 % (ref 12.3–15.4)
FERRITIN SERPL-MCNC: 224.3 NG/ML (ref 13–150)
GLOBULIN UR ELPH-MCNC: 3.2 GM/DL (ref 1.8–3.5)
GLUCOSE SERPL-MCNC: 421 MG/DL (ref 74–124)
HCT VFR BLD AUTO: 43.4 % (ref 34–46.6)
HGB BLD-MCNC: 13.6 G/DL (ref 12–15.9)
IMM GRANULOCYTES # BLD AUTO: 0.04 10*3/MM3 (ref 0–0.05)
IMM GRANULOCYTES NFR BLD AUTO: 0.4 % (ref 0–0.5)
IRON 24H UR-MRATE: 51 MCG/DL (ref 37–145)
IRON SATN MFR SERPL: 12 % (ref 14–48)
LYMPHOCYTES # BLD AUTO: 2.58 10*3/MM3 (ref 0.7–3.1)
LYMPHOCYTES NFR BLD AUTO: 23.7 % (ref 19.6–45.3)
MCH RBC QN AUTO: 28 PG (ref 26.6–33)
MCHC RBC AUTO-ENTMCNC: 31.3 G/DL (ref 31.5–35.7)
MCV RBC AUTO: 89.5 FL (ref 79–97)
MONOCYTES # BLD AUTO: 0.69 10*3/MM3 (ref 0.1–0.9)
MONOCYTES NFR BLD AUTO: 6.3 % (ref 5–12)
NEUTROPHILS NFR BLD AUTO: 67.1 % (ref 42.7–76)
NEUTROPHILS NFR BLD AUTO: 7.32 10*3/MM3 (ref 1.7–7)
NRBC BLD AUTO-RTO: 0 /100 WBC (ref 0–0.2)
PLATELET # BLD AUTO: 326 10*3/MM3 (ref 140–450)
PMV BLD AUTO: 9.7 FL (ref 6–12)
POTASSIUM SERPL-SCNC: 4.4 MMOL/L (ref 3.5–4.7)
PROT SERPL-MCNC: 7.8 G/DL (ref 6.3–8)
RBC # BLD AUTO: 4.85 10*6/MM3 (ref 3.77–5.28)
SODIUM SERPL-SCNC: 135 MMOL/L (ref 134–145)
TIBC SERPL-MCNC: 421 MCG/DL (ref 249–505)
TRANSFERRIN SERPL-MCNC: 301 MG/DL (ref 200–360)
WBC NRBC COR # BLD: 10.9 10*3/MM3 (ref 3.4–10.8)

## 2022-12-14 PROCEDURE — 83540 ASSAY OF IRON: CPT

## 2022-12-14 PROCEDURE — 84466 ASSAY OF TRANSFERRIN: CPT

## 2022-12-14 PROCEDURE — 80053 COMPREHEN METABOLIC PANEL: CPT

## 2022-12-14 PROCEDURE — 85025 COMPLETE CBC W/AUTO DIFF WBC: CPT

## 2022-12-14 PROCEDURE — 82728 ASSAY OF FERRITIN: CPT

## 2022-12-14 PROCEDURE — 99215 OFFICE O/P EST HI 40 MIN: CPT | Performed by: NURSE PRACTITIONER

## 2022-12-14 PROCEDURE — 36415 COLL VENOUS BLD VENIPUNCTURE: CPT

## 2022-12-14 RX ORDER — INSULIN GLARGINE 300 U/ML
80 INJECTION, SOLUTION SUBCUTANEOUS DAILY
Qty: 24 ML | Refills: 0 | Status: SHIPPED | OUTPATIENT
Start: 2022-12-14 | End: 2023-01-11 | Stop reason: SDUPTHER

## 2022-12-14 NOTE — PROGRESS NOTES
Subjective     REASON FOR FOLLOW-UP:    · History of iron deficiency anemia recurrent s/p IV Feraheme in the past  · Leukocytosis chronic, JAK2 mutation negative, JAK2 mutation exon 12 - negative -, BCR able negative      HISTORY OF PRESENT ILLNESS:    Patient is a 62 y.o. female who was referred here in March 2022 for fatigue and was found to be iron deficient. Even though her hemoglobin was normal at presentation, percent iron sat and ferritin were low. Patient also with leukocytosis chronically. We checked JAK2 mutation which was negative and BCR able is negative. Patient given IV Venofer for total of 1000 mg in April 2022.    Repeat iron studies in June showed replete iron stores.    Interval history:  Patient is seen back today in follow-up.  She continues to feel fatigued but also notes she has been working crazy hours with her job at [employer] during the holiday season, specifically working third shift and overtime.    Hemoglobin today 13.6, stable.  Her iron saturation has dropped back down however to 12%.  TIBC 421.  Ferritin remains elevated though likely reactive at 224.  She does qualify for further IV iron.    She did see GI, Dr. Vázquez, 9/1/2022. He recommended upper and lower endoscopies.  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    She is also overdue for screening mammogram and plans to schedule this in January 2023 as well.    Patient's biggest issue currently is poorly controlled diabetes.  Her hemoglobin A1c last month was over 10.  She states she has been fighting with insurance to get her insulin approved and has been without insulin for a month.  She has been going back and forth with phone calls to endocrinology. I encouraged her to perhaps stop in their office today to discuss face-to-face as this is unacceptable and they need to resolve this on her behalf.  She agrees.  We discussed this very well could be part of why she feels badly  as well.  For example her blood sugar today is 421.    She denies other concerns at this time.    Hematology history:  Patient is a 61-year-old female who has seen me in the past about 10 years ago with iron deficiency anemia and apparently received Feraheme infusions at that time.  Currently she is feeling fatigued and also has chronic leukocytosis and is here for evaluation.  She does have sinus infections and bronchitis for which she takes intermittent antibiotics.  She had to have drainage of her sinuses once.  She does complain of abdominal discomfort with fullness of the left upper quadrant.  Certainly we will need to evaluate her iron and B12 studies given her fatigue and likely  check her thyroid functions.    Patient has not lost weight.  She has got a reasonably good appetite.  She has chronic back pain and has had surgery in the low back many years ago.    Patient's past medical history is consistent with a TIA x2, hypertension,    Patient does smoke but not significant.  We have encouraged her to quit smoking    Past Medical History:   Diagnosis Date   • Anemia    • Anxiety    • Awareness under anesthesia     X2   • Back pain    • Bulging lumbar disc    • Clostridium difficile colitis 2005   • DDD (degenerative disc disease), lumbar    • Depression    • Dizziness    • Elevated blood sugar level     PT STATES THIS WAS AFTER RECEIVING CORTISONE INJECTIONS IN KNEES   • Frequent UTI     CURRENTLY   • GERD (gastroesophageal reflux disease)    • High cholesterol    • History of anemia    • History of blood transfusion 2016   • History of blood transfusion    • History of Clostridium difficile 2012   • History of snoring    • History of TIAs     LAST ONE 2 YRS AGO   • Hypertension    • IBS (irritable bowel syndrome)    • Infectious mononucleosis    • Left ankle pain    • Leg pain, right    • On home oxygen therapy     2-3L NC AT NIGHT   • HECTOR (obstructive sleep apnea)    • Pancreas disorder     PT TAKES VICTOZA  INJ    • PVC's (premature ventricular contractions)    • Restless leg syndrome    • Type 2 diabetes mellitus (HCC)         Past Surgical History:   Procedure Laterality Date   • ANKLE SURGERY Right    • BACK SURGERY     • FEMUR FRACTURE SURGERY     • HYSTERECTOMY     • KNEE ARTHROPLASTY Right    • NASAL SINUS SURGERY      X3   • MA REVISE KNEE JOINT REPLACE,1 PART Right 8/15/2016    Procedure: RT TOTAL KNEE ARTHROPLASTY REVISION;  Surgeon: Alcides Ramirez MD;  Location: [facility];  Service: Orthopedics        Current Outpatient Medications on File Prior to Visit   Medication Sig Dispense Refill   • albuterol sulfate  (90 Base) MCG/ACT inhaler Inhale 2 puffs.     • amLODIPine (NORVASC) 10 MG tablet TAKE 1 TABLET BY MOUTH EVERY DAY AT NIGHT     • Armodafinil (NUVIGIL PO) Take 250 mg by mouth every morning.     • celecoxib (CeleBREX) 200 MG capsule TAKE 1 CAPSULE BY MOUTH TWICE DAILY AS NEEDED FOR PAIN     • clopidogrel (PLAVIX) 75 MG tablet Take 75 mg by mouth daily.     • Continuous Blood Gluc  (FreeStyle Alex 2 [place]) device 1 Device Daily. 2 each 1   • Continuous Blood Gluc Sensor (FreeStyle Alex 2 Sensor) misc 1 Device Every 14 (Fourteen) Days. 2 each 3   • Diclofenac Sodium (VOLTAREN) 1 % gel gel APPLY 2-4 GRAMS TO THE AFFECTED AREA 1-2 TIMES PER DAY     • Dulaglutide (Trulicity) 4.5 MG/0.5ML solution pen-injector Inject 0.5 mL under the skin into the appropriate area as directed 1 (One) Time Per Week. 6 mL 2   • DULoxetine (CYMBALTA) 60 MG capsule TAKE 1 CAPSULE BY MOUTH TWICE DAILY     • furosemide (LASIX) 20 MG tablet Take 1 tablet by mouth Daily As Needed.     • gabapentin (NEURONTIN) 600 MG tablet Take 600 mg by mouth 3 (Three) Times a Day.     • Hyoscyamine Sulfate (LEVBID PO) Take 1.25 mg by mouth daily as needed.     • Insulin Glargine, 2 Unit Dial, (Toujealeshia Lentz SoloStar) 300 UNIT/ML solution pen-injector injection INJECT 80 UNITS DAILY (40-DAY SUPPLY) 6 mL 2   • Insulin Pen Needle 32G  X 4 MM misc Use as directed twice daily 100 each PRN   • lisinopril (PRINIVIL,ZESTRIL) 40 MG tablet Take 40 mg by mouth Daily.     • methocarbamol (ROBAXIN) 500 MG tablet Take 1-2 tablets by mouth Every 8 (Eight) Hours As Needed.     • metoprolol succinate XL (TOPROL-XL) 100 MG 24 hr tablet TAKE 2 TABLETS BY MOUTH EVERY DAY AT NIGHT     • montelukast (SINGULAIR) 10 MG tablet Take 10 mg by mouth Every Night.     • omeprazole (prilOSEC) 10 MG capsule Take 10 mg by mouth Daily.     • ondansetron ODT (ZOFRAN-ODT) 4 MG disintegrating tablet Take 1 tablet by mouth 4 (Four) Times a Day As Needed for Nausea or Vomiting. 15 tablet 0   • oxyCODONE-acetaminophen (PERCOCET)  MG per tablet 1 TABLET AS NEEDED ORAL EVERY 6 HRS 30 DAYS     • rOPINIRole (REQUIP) 1 MG tablet Take 2 mg by mouth Every Night.     • rosuvastatin (CRESTOR) 40 MG tablet TAKE 1 TABLET BY MOUTH EVERY DAY 90 tablet 3   • Synjardy XR 12.5-1000 MG tablet sustained-release 24 hour TAKE 2 TABLETS BY MOUTH DAILY WITH BREAKFAST. 60 tablet 6   • Topiramate 25 MG/ML solution Take 1 tablet by mouth 2 (Two) Times a Day.     • traZODone (DESYREL) 100 MG tablet Take 300 mg by mouth Every Night.     • triamcinolone (KENALOG) 0.5 % cream Apply  topically to the appropriate area as directed.     • vitamin D (ERGOCALCIFEROL) 1.25 MG (43424 UT) capsule capsule TAKE 1 CAPSULE BY MOUTH EVERY WEEK 12 capsule 0   • ONETOUCH DELICA LANCETS FINE misc 1 each 3 (Three) Times a Day. 100 each 5   • ONETOUCH VERIO test strip Test 2-3 times daily Use as instructed 100 each 5     No current facility-administered medications on file prior to visit.        ALLERGIES:    Allergies   Allergen Reactions   • Cortisone Other (See Comments)     TIA-POST INJECTION- KNEES   • Aspirin Buf(Cacarb-Mgcarb-Mgo) Other (See Comments)     Nose bleeds   • Augmentin [Amoxicillin-Pot Clavulanate] GI Intolerance        Social History     Socioeconomic History   • Marital status:      Spouse name:  "Jose   • Number of children: 1   Tobacco Use   • Smoking status: Never   • Smokeless tobacco: Never   Substance and Sexual Activity   • Alcohol use: Not Currently     Alcohol/week: 1.0 standard drink     Types: 1 Cans of beer per week     Comment: RARELY   • Drug use: No   • Sexual activity: Not Currently     Partners: Male     Birth control/protection: Post-menopausal        Family History   Problem Relation Age of Onset   • Hypertension Father    • Heart disease Father    • Heart attack Father    • Asthma Brother    • Colon cancer Paternal Grandfather         Review of Systems   Constitutional: Negative for appetite change, chills, diaphoresis, fatigue, fever and unexpected weight change.   HENT: Negative for hearing loss, sore throat and trouble swallowing.    Respiratory: Negative for cough, chest tightness, shortness of breath and wheezing.    Cardiovascular: Negative for chest pain, palpitations and leg swelling.   Gastrointestinal: Negative for abdominal distention, abdominal pain, constipation, diarrhea, nausea and vomiting.   Genitourinary: Negative for dysuria, frequency, hematuria and urgency.   Musculoskeletal: Negative for joint swelling.        No muscle weakness.   Skin: Negative for rash and wound.   Neurological: Negative for seizures, syncope, speech difficulty, weakness, numbness and headaches.   Hematological: Negative for adenopathy. Does not bruise/bleed easily.   Psychiatric/Behavioral: Negative for behavioral problems, confusion and suicidal ideas.   All other systems reviewed and are negative.       Objective     Vitals:    12/14/22 0905   BP: 145/76   Pulse: 83   Resp: 18   Temp: 98.4 °F (36.9 °C)   TempSrc: Temporal   SpO2: 98%   Weight: 120 kg (264 lb 11.2 oz)   Height: 172.7 cm (67.99\")   PainSc:   6   PainLoc: Leg     Current Status 12/14/2022   ECOG score 1       Physical Exam      CONSTITUTIONAL:  Vital signs reviewed.  No distress, looks comfortable.  EYES:  Conjunctivae and lids " unremarkable.  PERRLA  EARS,NOSE,MOUTH,THROAT:  Ears and nose appear unremarkable.  Lips, teeth, gums appear unremarkable.  RESPIRATORY:  Normal respiratory effort.  Lungs clear to auscultation bilaterally.  CARDIOVASCULAR:  Normal S1, S2.  No murmurs rubs or gallops.  No significant lower extremity edema.  GASTROINTESTINAL: Abdomen appears unremarkable.  Nontender.  No hepatomegaly.  No splenomegaly.  LYMPHATIC:  No cervical, supraclavicular, axillary lymphadenopathy.  SKIN:  Warm.  No rashes.  PSYCHIATRIC:  Normal judgment and insight.  Normal mood and affect.    RECENT LABS:  Results from last 7 days   Lab Units 12/14/22  0854   WBC 10*3/mm3 10.90*   NEUTROS ABS 10*3/mm3 7.32*   HEMOGLOBIN g/dL 13.6   HEMATOCRIT % 43.4   PLATELETS 10*3/mm3 326     Results from last 7 days   Lab Units 12/14/22  0854   SODIUM mmol/L 135   POTASSIUM mmol/L 4.4   CHLORIDE mmol/L 92*   CO2 mmol/L 29.2*   BUN mg/dL 8   CREATININE mg/dL 0.63   CALCIUM mg/dL 10.3*   ALBUMIN g/dL 4.60   BILIRUBIN mg/dL 0.3   ALK PHOS U/L 106   ALT (SGPT) U/L 11   AST (SGOT) U/L 11   GLUCOSE mg/dL 421*   FERRITIN ng/mL 224.30*   IRON mcg/dL 51   TIBC mcg/dL 421               Assessment & Plan     *Recurrent Iron deficiency anemia   · Unable to tolerate oral iron.  · Treated with Feraheme in the past  · Referred here March 2022. Symptomatic with significant fatigue even though her hemoglobin normal. B12 306, ESR 50, RBC folate normal thyroid function test normal ferritin 104 and percent saturation of 13 with TIBC elevation indicating iron deficiency  · Patient denies active GI bleeding  · She did have a colonoscopy in the past reportedly negative  · S/p IV Venofer x5 in April 25, 2022  · Patient referred to GI, seen by Dr. Vázquez in September 2022. EGD/colonoscopy recommended. Patient in process of scheduling for 1/2023.  · 12/14/2022 iron sat is dropped back down to 12%, TIBC elevated.  Ferritin is 224 but likely reactive.  She does qualify for further IV  iron and we will proceed considering she feels poorly.    *Leukocytosis, chronic  · Reviewing the records she has had intermittent leukocytosis anywhere from 11-13  · She also complained of abdominal tenderness and discomfort  · She will require complete evaluation with CT scan of the abdomen pelvis.  We will likely obtain C-reactive protein, ESR and plain x-rays of the CT of the sinuses and CT scan of the abdomen and pelvis  · JAK2 mutation negative JAK2 exon 12 - negative, BCR see able negative negative  · 2/27/2022: Hemoglobin 13.4 but white count is 14.01.  Chronic leukocytosis stable  · WBC remains mildly elevated but better overall at 10.9. Monitor.    *Uncontrolled diabetes  · Hemoglobin A1c in November 2022 was over 10.  · Patient has continued on oral agents through Dr. King.  More recently insulin was added however for the last month patient has been without insulin. Patient reports that she has been fighting with insurance regarding further insulin approval.  She has been playing phone tag with endocrinology office as well.  She plans at this point to go to the office today to discuss in person which I believe is a good idea as her blood sugar is markedly elevated even today at over 400.    Plan  · Reviewed iron studies showing drop in iron saturation.  Patient is symptomatic with worsening fatigue and we will pursue additional IV iron.  · We will schedule patient back for IV Venofer 300 mg x 3.  · Patient is in the process of scheduling EGD/colonoscopy with Dr. De Los Santos for January 2023.  · Patient is also in the process of scheduling screening mammogram for January 2023.  · Patient plans to go to endocrinology office today to discuss issues regarding insulin approval and uncontrolled diabetes as outlined.  We discussed that her poorly controlled diabetes is undoubtedly contributing to her feeling poorly as well.  · Patient will otherwise follow-up in 6 months with Dr. Avilez with repeat CBC, CMP,  ferritin and iron profile    I spent 62 minutes caring for Ashley on this date of service. This time includes time spent by me in the following activities: preparing for the visit, reviewing tests, obtaining and/or reviewing a separately obtained history, performing a medically appropriate examination and/or evaluation, counseling and educating the patient/family/caregiver, ordering medications, tests, or procedures, documenting information in the medical record, independently interpreting results and communicating that information with the patient/family/caregiver and care coordination      Padma Sales, APRN

## 2022-12-19 ENCOUNTER — TELEPHONE (OUTPATIENT)
Dept: ONCOLOGY | Facility: CLINIC | Age: 62
End: 2022-12-19

## 2022-12-19 NOTE — TELEPHONE ENCOUNTER
Left message requesting patient to call back to direct number, as her iron labs were decreased on labs on the day of visit with NP.  Let her know that NP was ordering IV iron, and to please call back so we will know she has received message.  Was able to reach patient, and she was aware of the iron infusions being ordered.  Let her know scheduling will be calling to set up appointments.  Patient verbalized understanding.

## 2023-01-07 ENCOUNTER — PRIOR AUTHORIZATION (OUTPATIENT)
Dept: ENDOCRINOLOGY | Age: 63
End: 2023-01-07
Payer: COMMERCIAL

## 2023-01-11 ENCOUNTER — OFFICE VISIT (OUTPATIENT)
Dept: ENDOCRINOLOGY | Age: 63
End: 2023-01-11
Payer: COMMERCIAL

## 2023-01-11 VITALS
SYSTOLIC BLOOD PRESSURE: 146 MMHG | BODY MASS INDEX: 39.47 KG/M2 | HEIGHT: 68 IN | HEART RATE: 89 BPM | DIASTOLIC BLOOD PRESSURE: 84 MMHG | TEMPERATURE: 97.7 F | WEIGHT: 260.4 LBS | OXYGEN SATURATION: 92 %

## 2023-01-11 DIAGNOSIS — E11.65 TYPE 2 DIABETES MELLITUS WITH HYPERGLYCEMIA, WITH LONG-TERM CURRENT USE OF INSULIN: Primary | ICD-10-CM

## 2023-01-11 DIAGNOSIS — Z79.4 TYPE 2 DIABETES MELLITUS WITH HYPERGLYCEMIA, WITH LONG-TERM CURRENT USE OF INSULIN: Primary | ICD-10-CM

## 2023-01-11 PROCEDURE — 99214 OFFICE O/P EST MOD 30 MIN: CPT | Performed by: NURSE PRACTITIONER

## 2023-01-11 RX ORDER — INSULIN GLARGINE 300 U/ML
INJECTION, SOLUTION SUBCUTANEOUS
Qty: 90 ML | Refills: 0 | Status: SHIPPED | OUTPATIENT
Start: 2023-01-11 | End: 2023-03-27

## 2023-01-11 RX ORDER — NITROFURANTOIN 25; 75 MG/1; MG/1
CAPSULE ORAL
COMMUNITY
Start: 2022-11-15

## 2023-01-11 RX ORDER — BLOOD-GLUCOSE METER
KIT MISCELLANEOUS
Qty: 1 EACH | Refills: 0 | Status: SHIPPED | OUTPATIENT
Start: 2023-01-11

## 2023-01-11 RX ORDER — LANCETS 30 GAUGE
EACH MISCELLANEOUS
Qty: 300 EACH | Refills: 4 | Status: SHIPPED | OUTPATIENT
Start: 2023-01-11

## 2023-01-11 NOTE — PROGRESS NOTES
"Chief Complaint  Diabetes (Type 2: Pt doesn't have meter, is up to date on eye exam, no hx of retinopathy, mild neuropathy. )    Subjective        Ashley Malone presents to [facility] ENDOCRINOLOGY  History of Present Illness     Lab Results   Component Value Date    HGBA1C 10.10 (H) 11/09/2022     Working night shift, long hours and a lot of increased stress     Pmh: mini strokes     Type 2 dm    Diagnosed about 10 years ago.   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    Weight as of this encounter: 118 kg (260 lb 6.4 oz).             Physical Exam  Vitals reviewed.   Constitutional:       General: She is not in acute distress.  HENT:      Head: Normocephalic and atraumatic.   Eyes:      General:         Right eye: No discharge.         Left eye: No discharge.   Pulmonary:      Effort: Pulmonary effort is normal. No respiratory distress.   Musculoskeletal:         General: No signs of injury. Normal range of motion.      Cervical back: Normal range of motion and neck supple.   Skin:     General: Skin is warm and dry.   Neurological:      Mental Status: She is alert and oriented to person, place, and time. Mental status is at baseline.   Psychiatric:         Mood and " Affect: Mood normal.         Behavior: Behavior normal.         Thought Content: Thought content normal.         Judgment: Judgment normal.        Result Review :  The following data was reviewed by: SAVANNAH Mosqueda on 01/11/2023:  Common labs    Common Labs 8/18/22 11/9/22 11/9/22 11/9/22 12/14/22 12/14/22     0951 0951 0951 0854 0854   Glucose  439 (A)    421 (A)   BUN  7 (A)    8   Creatinine  0.79    0.63   Sodium  133 (A)    135   Potassium  4.5    4.4   Chloride  92 (A)    92 (A)   Calcium  9.4    10.3 (A)   Albumin      4.60   Total Bilirubin      0.3   Alkaline Phosphatase      106   AST (SGOT)      11   ALT (SGPT)      11   WBC     10.90 (A)    Hemoglobin     13.6    Hematocrit     43.4    Platelets     326    Total Cholesterol    183     Triglycerides    311 (A)     HDL Cholesterol    50     LDL Cholesterol     82     Hemoglobin A1C 8.1 (A)  10.10 (A)      (A) Abnormal value       Comments are available for some flowsheets but are not being displayed.                        Assessment and Plan   Diagnoses and all orders for this visit:    1. Type 2 diabetes mellitus with hyperglycemia, with long-term current use of insulin (HCC) (Primary)  -     Ambulatory Referral to Diabetic Education    Other orders  -     glucose monitor monitoring kit; Dispense one kit based on insurance preference and related supplies to check 4 times a day. Dx: E 11.9  Dispense: 1 each; Refill: 0  -     Lancets misc; Dispense based on insurance preference: Check 4 times a day. Dx: E 11.9  Dispense: 300 each; Refill: 4  -     glucose blood test strip; Dispense based on insurance preference: Check 4 times a day Dx: E 11.9  Dispense: 300 each; Refill: 4  -     Insulin Glargine, 2 Unit Dial, (Toujeo Max SoloStar) 300 UNIT/ML solution pen-injector injection; 80u after waking up and 40u before bed  Dispense: 90 mL; Refill: 0             Follow Up   Return in about 1 week (around 1/18/2023).     High risk of diabetic complications  To  monitor blood sugar using fingerstick blood glucose monitoring, new prescription sent to  Diabetes education  Change Toujeo to 40 units when she wakes up and 40 units before bed  To keep blood sugar log 4 times a day to bring to clinic  Educated on hypoglycemia prevention, awareness and treatment  Patient verbalizes risk of uncontrolled hyperglycemia and need to improve glycemic status to reduce the risk of complications    Patient was given instructions and counseling regarding her condition or for health maintenance advice. Please see specific information pulled into the AVS if appropriate.     Mayda Roayl, APRN

## 2023-01-11 NOTE — PATIENT INSTRUCTIONS
Check BS 4x/day    When you wake up, 2 hours after lunch, before dinner and before bed    Take Toujeo 80u when you wake up and 40u before bed     Diabetes Mellitus and Standards of Medical Care  Living with and managing diabetes (diabetes mellitus) can be complicated. Your diabetes treatment may be managed by a team of health care providers, including:  A physician who specializes in diabetes (endocrinologist). You might also have visits with a nurse practitioner or physician assistant.  Nurses.  A registered dietitian.  A certified diabetes care and .  An exercise specialist.  A pharmacist.  An eye doctor.  A foot specialist (podiatrist).  A dental care provider.  A primary care provider.  A mental health care provider.  How to manage your diabetes  You can do many things to successfully manage your diabetes. Your health care providers will follow guidelines to help you get the best quality of care. Here are general guidelines for your diabetes management plan. Your health care providers may give you more specific instructions.  Physical exams  When you are diagnosed with diabetes, and each year after that, your health care provider will ask about your medical and family history. You will have a physical exam, which may include:  Measuring your height, weight, and body mass index (BMI).  Checking your blood pressure. This will be done at every routine medical visit. Your target blood pressure may vary depending on your medical conditions, your age, and other factors.  A thyroid exam.  A skin exam.  Screening for nerve damage (peripheral neuropathy). This may include checking the pulse in your legs and feet and the level of sensation in your hands and feet.  A foot exam to inspect the structure and skin of your feet, including checking for cuts, bruises, redness, blisters, sores, or other problems.  Screening for blood vessel (vascular) problems. This may include checking the pulse in your legs  and feet and checking your temperature.  Blood tests  Depending on your treatment plan and your personal needs, you may have the following tests:  Hemoglobin A1C (HbA1C). This test provides information about blood sugar (glucose) control over the previous 2-3 months. It is used to adjust your treatment plan, if needed. This test will be done:  At least 2 times a year, if you are meeting your treatment goals.  4 times a year, if you are not meeting your treatment goals or if your goals have changed.  Lipid testing, including total cholesterol, LDL and HDL cholesterol, and triglyceride levels.  The goal for LDL is less than 100 mg/dL (5.5 mmol/L). If you are at high risk for complications, the goal is less than 70 mg/dL (3.9 mmol/L).  The goal for HDL is 40 mg/dL (2.2 mmol/L) or higher for men, and 50 mg/dL (2.8 mmol/L) or higher for women. An HDL cholesterol of 60 mg/dL (3.3 mmol/L) or higher gives some protection against heart disease.  The goal for triglycerides is less than 150 mg/dL (8.3 mmol/L).  Liver function tests.  Kidney function tests.  Thyroid function tests.    Dental and eye exams    Visit your dentist two times a year.  If you have type 1 diabetes, your health care provider may recommend an eye exam within 5 years after you are diagnosed, and then once a year after your first exam.  For children with type 1 diabetes, the health care provider may recommend an eye exam when your child is age 11 or older and has had diabetes for 3-5 years. After the first exam, your child should get an eye exam once a year.  If you have type 2 diabetes, your health care provider may recommend an eye exam as soon as you are diagnosed, and then every 1-2 years after your first exam.  Immunizations  A yearly flu (influenza) vaccine is recommended annually for everyone 6 months or older. This is especially important if you have diabetes.  The pneumonia (pneumococcal) vaccine is recommended for everyone 2 years or older who  has diabetes. If you are age 65 or older, you may get the pneumonia vaccine as a series of two separate shots.  The hepatitis B vaccine is recommended for adults shortly after being diagnosed with diabetes.  Adults and children with diabetes should receive all other vaccines according to age-specific recommendations from the Centers for Disease Control and Prevention (CDC).  Mental and emotional health  Screening for symptoms of eating disorders, anxiety, and depression is recommended at the time of diagnosis and after as needed. If your screening shows that you have symptoms, you may need more evaluation. You may work with a mental health care provider.  Follow these instructions at home:  Treatment plan  You will monitor your blood glucose levels and may give yourself insulin. Your treatment plan will be reviewed at every medical visit. You and your health care provider will discuss:  How you are taking your medicines, including insulin.  Any side effects you have.  Your blood glucose level target goals.  How often you monitor your blood glucose level.  Lifestyle habits, such as activity level and tobacco, alcohol, and substance use.  Education  Your health care provider will assess how well you are monitoring your blood glucose levels and whether you are taking your insulin and medicines correctly. He or she may refer you to:  A certified diabetes care and  to manage your diabetes throughout your life, starting at diagnosis.  A registered dietitian who can create and review your personal nutrition plan.  An exercise specialist who can discuss your activity level and exercise plan.  General instructions  Take over-the-counter and prescription medicines only as told by your health care provider.  Keep all follow-up visits. This is important.  Where to find support  There are many diabetes support networks, including:  American Diabetes Association (ADA): diabetes.org  Defeat Diabetes Foundation:  defeatdiabetes.org  Where to find more information  American Diabetes Association (ADA): www.diabetes.org  Association of Diabetes Care & Education Specialists (ADCES): diabeteseducator.org  International Diabetes Federation (IDF): idf.org  Summary  Managing diabetes (diabetes mellitus) can be complicated. Your diabetes treatment may be managed by a team of health care providers.  Your health care providers follow guidelines to help you get the best quality care.  You should have physical exams, blood tests, blood pressure monitoring, immunizations, and screening tests regularly. Stay updated on how to manage your diabetes.  Your health care providers may also give you more specific instructions based on your individual health.  This information is not intended to replace advice given to you by your health care provider. Make sure you discuss any questions you have with your health care provider.  Document Revised: 06/24/2021 Document Reviewed: 06/24/2021  [employer] Patient Education © 2022 [employer]    Diabetes Mellitus and Nutrition, Adult  When you have diabetes, or diabetes mellitus, it is very important to have healthy eating habits because your blood sugar (glucose) levels are greatly affected by what you eat and drink. Eating healthy foods in the right amounts, at about the same times every day, can help you:  Manage your blood glucose.  Lower your risk of heart disease.  Improve your blood pressure.  Reach or maintain a healthy weight.  What can affect my meal plan?  Every person with diabetes is different, and each person has different needs for a meal plan. Your health care provider may recommend that you work with a dietitian to make a meal plan that is best for you. Your meal plan may vary depending on factors such as:  The calories you need.  The medicines you take.  Your weight.  Your blood glucose, blood pressure, and cholesterol levels.  Your activity level.  Other health conditions you have, such  as heart or kidney disease.  How do carbohydrates affect me?  Carbohydrates, also called carbs, affect your blood glucose level more than any other type of food. Eating carbs raises the amount of glucose in your blood.  It is important to know how many carbs you can safely have in each meal. This is different for every person. Your dietitian can help you calculate how many carbs you should have at each meal and for each snack.  How does alcohol affect me?  Alcohol can cause a decrease in blood glucose (hypoglycemia), especially if you use insulin or take certain diabetes medicines by mouth. Hypoglycemia can be a life-threatening condition. Symptoms of hypoglycemia, such as sleepiness, dizziness, and confusion, are similar to symptoms of having too much alcohol.  Do not drink alcohol if:  Your health care provider tells you not to drink.  You are pregnant, may be pregnant, or are planning to become pregnant.  If you drink alcohol:  Limit how much you have to:  0-1 drink a day for women.  0-2 drinks a day for men.  Know how much alcohol is in your drink. In the [place] one drink equals one 12 oz bottle of beer (355 mL), one 5 oz glass of wine (148 mL), or one 1½ oz glass of hard liquor (44 mL).  Keep yourself hydrated with water, diet soda, or unsweetened iced tea. Keep in mind that regular soda, juice, and other mixers may contain a lot of sugar and must be counted as carbs.  What are tips for following this plan?  Reading food labels  Start by checking the serving size on the Nutrition Facts label of packaged foods and drinks. The number of calories and the amount of carbs, fats, and other nutrients listed on the label are based on one serving of the item. Many items contain more than one serving per package.  Check the total grams (g) of carbs in one serving.  Check the number of grams of saturated fats and trans fats in one serving. Choose foods that have a low amount or none of these fats.  Check the number of  "milligrams (mg) of salt (sodium) in one serving. Most people should limit total sodium intake to less than 2,300 mg per day.  Always check the nutrition information of foods labeled as \"low-fat\" or \"nonfat.\" These foods may be higher in added sugar or refined carbs and should be avoided.  Talk to your dietitian to identify your daily goals for nutrients listed on the label.  Shopping  Avoid buying canned, pre-made, or processed foods. These foods tend to be high in fat, sodium, and added sugar.  Shop around the outside edge of the grocery store. This is where you will most often find fresh fruits and vegetables, bulk grains, fresh meats, and fresh dairy products.  Cooking  Use low-heat cooking methods, such as baking, instead of high-heat cooking methods, such as deep frying.  Cook using healthy oils, such as olive, canola, or sunflower oil.  Avoid cooking with butter, cream, or high-fat meats.  Meal planning  Eat meals and snacks regularly, preferably at the same times every day. Avoid going long periods of time without eating.  Eat foods that are high in fiber, such as fresh fruits, vegetables, beans, and whole grains.  Eat 4-6 oz (112-168 g) of lean protein each day, such as lean meat, chicken, fish, eggs, or tofu. One ounce (oz) (28 g) of lean protein is equal to:  1 oz (28 g) of meat, chicken, or fish.  1 egg.  ¼ cup (62 g) of tofu.  Eat some foods each day that contain healthy fats, such as avocado, nuts, seeds, and fish.  What foods should I eat?  Fruits  Berries. Apples. Oranges. Peaches. Apricots. Plums. Grapes. Mangoes. Papayas. Pomegranates. Kiwi. Cherries.  Vegetables  Leafy greens, including lettuce, spinach, kale, chard, dylan greens, mustard greens, and cabbage. Beets. Cauliflower. Broccoli. Carrots. Green beans. Tomatoes. Peppers. Onions. Cucumbers. [place] sprouts.  Grains  Whole grains, such as whole-wheat or whole-grain bread, crackers, tortillas, cereal, and pasta. Unsweetened oatmeal. Quinoa. " Brown or wild rice.  Meats and other proteins  Seafood. Poultry without skin. Lean cuts of poultry and beef. Tofu. Nuts. Seeds.  Dairy  Low-fat or fat-free dairy products such as milk, yogurt, and cheese.  The items listed above may not be a complete list of foods and beverages you can eat and drink. Contact a dietitian for more information.  What foods should I avoid?  Fruits  Fruits canned with syrup.  Vegetables  Canned vegetables. Frozen vegetables with butter or cream sauce.  Grains  Refined white flour and flour products such as bread, pasta, snack foods, and cereals. Avoid all processed foods.  Meats and other proteins  Fatty cuts of meat. Poultry with skin. Breaded or fried meats. Processed meat. Avoid saturated fats.  Dairy  Full-fat yogurt, cheese, or milk.  Beverages  Sweetened drinks, such as soda or iced tea.  The items listed above may not be a complete list of foods and beverages you should avoid. Contact a dietitian for more information.  Questions to ask a health care provider  Do I need to meet with a certified diabetes care and ?  Do I need to meet with a dietitian?  What number can I call if I have questions?  When are the best times to check my blood glucose?  Where to find more information:  American Diabetes Association: diabetes.org  Academy of Nutrition and Dietetics: eatright.org  National [place] of Diabetes and Digestive and Kidney Diseases: niddk.nih.gov  Association of Diabetes Care & Education Specialists: diabeteseducator.org  Summary  It is important to have healthy eating habits because your blood sugar (glucose) levels are greatly affected by what you eat and drink. It is important to use alcohol carefully.  A healthy meal plan will help you manage your blood glucose and lower your risk of heart disease.  Your health care provider may recommend that you work with a dietitian to make a meal plan that is best for you.  This information is not intended to replace  advice given to you by your health care provider. Make sure you discuss any questions you have with your health care provider.  Document Revised: 07/21/2021 Document Reviewed: 07/21/2021  Elsevier Patient Education © 2022 [employer] Inc.

## 2023-01-13 ENCOUNTER — INFUSION (OUTPATIENT)
Dept: ONCOLOGY | Facility: HOSPITAL | Age: 63
End: 2023-01-13
Payer: COMMERCIAL

## 2023-01-13 VITALS
TEMPERATURE: 97.2 F | WEIGHT: 267 LBS | DIASTOLIC BLOOD PRESSURE: 85 MMHG | OXYGEN SATURATION: 95 % | BODY MASS INDEX: 40.61 KG/M2 | HEART RATE: 100 BPM | SYSTOLIC BLOOD PRESSURE: 165 MMHG | RESPIRATION RATE: 18 BRPM

## 2023-01-13 DIAGNOSIS — T45.4X5A ADVERSE EFFECT OF IRON, INITIAL ENCOUNTER: ICD-10-CM

## 2023-01-13 DIAGNOSIS — D50.9 IRON DEFICIENCY ANEMIA, UNSPECIFIED IRON DEFICIENCY ANEMIA TYPE: Primary | ICD-10-CM

## 2023-01-13 PROCEDURE — 96366 THER/PROPH/DIAG IV INF ADDON: CPT

## 2023-01-13 PROCEDURE — 63710000001 DIPHENHYDRAMINE PER 50 MG: Performed by: INTERNAL MEDICINE

## 2023-01-13 PROCEDURE — 96365 THER/PROPH/DIAG IV INF INIT: CPT

## 2023-01-13 PROCEDURE — 25010000002 IRON SUCROSE PER 1 MG: Performed by: INTERNAL MEDICINE

## 2023-01-13 RX ORDER — DIPHENHYDRAMINE HCL 25 MG
25 CAPSULE ORAL ONCE
Status: COMPLETED | OUTPATIENT
Start: 2023-01-13 | End: 2023-01-13

## 2023-01-13 RX ORDER — ACETAMINOPHEN 325 MG/1
650 TABLET ORAL ONCE
Status: COMPLETED | OUTPATIENT
Start: 2023-01-13 | End: 2023-01-13

## 2023-01-13 RX ORDER — SODIUM CHLORIDE 9 MG/ML
250 INJECTION, SOLUTION INTRAVENOUS ONCE
Status: COMPLETED | OUTPATIENT
Start: 2023-01-13 | End: 2023-01-13

## 2023-01-13 RX ADMIN — DIPHENHYDRAMINE HYDROCHLORIDE 25 MG: 25 CAPSULE ORAL at 09:43

## 2023-01-13 RX ADMIN — IRON SUCROSE 300 MG: 20 INJECTION, SOLUTION INTRAVENOUS at 10:05

## 2023-01-13 RX ADMIN — ACETAMINOPHEN 650 MG: 325 TABLET, FILM COATED ORAL at 09:43

## 2023-01-13 RX ADMIN — SODIUM CHLORIDE 250 ML: 9 INJECTION, SOLUTION INTRAVENOUS at 09:43

## 2023-01-20 ENCOUNTER — INFUSION (OUTPATIENT)
Dept: ONCOLOGY | Facility: HOSPITAL | Age: 63
End: 2023-01-20
Payer: COMMERCIAL

## 2023-01-20 VITALS
HEART RATE: 107 BPM | RESPIRATION RATE: 18 BRPM | WEIGHT: 261.6 LBS | BODY MASS INDEX: 39.79 KG/M2 | SYSTOLIC BLOOD PRESSURE: 159 MMHG | TEMPERATURE: 98.2 F | DIASTOLIC BLOOD PRESSURE: 87 MMHG | OXYGEN SATURATION: 92 %

## 2023-01-20 DIAGNOSIS — D50.9 IRON DEFICIENCY ANEMIA, UNSPECIFIED IRON DEFICIENCY ANEMIA TYPE: Primary | ICD-10-CM

## 2023-01-20 DIAGNOSIS — T45.4X5A ADVERSE EFFECT OF IRON, INITIAL ENCOUNTER: ICD-10-CM

## 2023-01-20 PROCEDURE — 96365 THER/PROPH/DIAG IV INF INIT: CPT

## 2023-01-20 PROCEDURE — 25010000002 IRON SUCROSE PER 1 MG: Performed by: INTERNAL MEDICINE

## 2023-01-20 PROCEDURE — 96366 THER/PROPH/DIAG IV INF ADDON: CPT

## 2023-01-20 PROCEDURE — 63710000001 DIPHENHYDRAMINE PER 50 MG: Performed by: INTERNAL MEDICINE

## 2023-01-20 RX ORDER — DIPHENHYDRAMINE HCL 25 MG
25 CAPSULE ORAL ONCE
Status: COMPLETED | OUTPATIENT
Start: 2023-01-20 | End: 2023-01-20

## 2023-01-20 RX ORDER — SODIUM CHLORIDE 9 MG/ML
250 INJECTION, SOLUTION INTRAVENOUS ONCE
Status: COMPLETED | OUTPATIENT
Start: 2023-01-20 | End: 2023-01-20

## 2023-01-20 RX ORDER — ACETAMINOPHEN 325 MG/1
650 TABLET ORAL ONCE
Status: COMPLETED | OUTPATIENT
Start: 2023-01-20 | End: 2023-01-20

## 2023-01-20 RX ADMIN — DIPHENHYDRAMINE HYDROCHLORIDE 25 MG: 25 CAPSULE ORAL at 11:57

## 2023-01-20 RX ADMIN — ACETAMINOPHEN 650 MG: 325 TABLET, FILM COATED ORAL at 11:57

## 2023-01-20 RX ADMIN — SODIUM CHLORIDE 250 ML: 9 INJECTION, SOLUTION INTRAVENOUS at 11:55

## 2023-01-20 RX ADMIN — IRON SUCROSE 300 MG: 20 INJECTION, SOLUTION INTRAVENOUS at 12:15

## 2023-01-27 ENCOUNTER — INFUSION (OUTPATIENT)
Dept: ONCOLOGY | Facility: HOSPITAL | Age: 63
End: 2023-01-27
Payer: COMMERCIAL

## 2023-01-27 VITALS
SYSTOLIC BLOOD PRESSURE: 103 MMHG | DIASTOLIC BLOOD PRESSURE: 68 MMHG | OXYGEN SATURATION: 94 % | HEART RATE: 90 BPM | WEIGHT: 266 LBS | BODY MASS INDEX: 40.45 KG/M2 | TEMPERATURE: 98.6 F | RESPIRATION RATE: 18 BRPM

## 2023-01-27 DIAGNOSIS — D50.9 IRON DEFICIENCY ANEMIA, UNSPECIFIED IRON DEFICIENCY ANEMIA TYPE: Primary | ICD-10-CM

## 2023-01-27 DIAGNOSIS — T45.4X5A ADVERSE EFFECT OF IRON, INITIAL ENCOUNTER: ICD-10-CM

## 2023-01-27 PROCEDURE — 63710000001 DIPHENHYDRAMINE PER 50 MG: Performed by: INTERNAL MEDICINE

## 2023-01-27 PROCEDURE — 96365 THER/PROPH/DIAG IV INF INIT: CPT

## 2023-01-27 PROCEDURE — 25010000002 IRON SUCROSE PER 1 MG: Performed by: INTERNAL MEDICINE

## 2023-01-27 RX ORDER — ACETAMINOPHEN 325 MG/1
650 TABLET ORAL ONCE
Status: COMPLETED | OUTPATIENT
Start: 2023-01-27 | End: 2023-01-27

## 2023-01-27 RX ORDER — DIPHENHYDRAMINE HCL 25 MG
25 CAPSULE ORAL ONCE
Status: COMPLETED | OUTPATIENT
Start: 2023-01-27 | End: 2023-01-27

## 2023-01-27 RX ORDER — SODIUM CHLORIDE 9 MG/ML
250 INJECTION, SOLUTION INTRAVENOUS ONCE
Status: COMPLETED | OUTPATIENT
Start: 2023-01-27 | End: 2023-01-27

## 2023-01-27 RX ADMIN — DIPHENHYDRAMINE HYDROCHLORIDE 25 MG: 25 CAPSULE ORAL at 09:24

## 2023-01-27 RX ADMIN — SODIUM CHLORIDE 250 ML: 9 INJECTION, SOLUTION INTRAVENOUS at 09:50

## 2023-01-27 RX ADMIN — ACETAMINOPHEN 650 MG: 325 TABLET, FILM COATED ORAL at 09:24

## 2023-01-27 RX ADMIN — IRON SUCROSE 300 MG: 20 INJECTION, SOLUTION INTRAVENOUS at 09:50

## 2023-02-15 RX ORDER — ERGOCALCIFEROL 1.25 MG/1
CAPSULE ORAL
Qty: 12 CAPSULE | Refills: 0 | Status: SHIPPED | OUTPATIENT
Start: 2023-02-15 | End: 2023-04-07

## 2023-02-21 ENCOUNTER — PRIOR AUTHORIZATION (OUTPATIENT)
Dept: ENDOCRINOLOGY | Age: 63
End: 2023-02-21
Payer: COMMERCIAL

## 2023-03-02 ENCOUNTER — TELEPHONE (OUTPATIENT)
Dept: ENDOCRINOLOGY | Age: 63
End: 2023-03-02
Payer: COMMERCIAL

## 2023-03-02 DIAGNOSIS — E11.65 TYPE 2 DIABETES MELLITUS WITH HYPERGLYCEMIA, WITH LONG-TERM CURRENT USE OF INSULIN: Primary | ICD-10-CM

## 2023-03-02 DIAGNOSIS — Z79.4 TYPE 2 DIABETES MELLITUS WITH HYPERGLYCEMIA, WITH LONG-TERM CURRENT USE OF INSULIN: Primary | ICD-10-CM

## 2023-03-07 ENCOUNTER — LAB (OUTPATIENT)
Dept: ENDOCRINOLOGY | Age: 63
End: 2023-03-07
Payer: COMMERCIAL

## 2023-03-07 DIAGNOSIS — E11.65 TYPE 2 DIABETES MELLITUS WITH HYPERGLYCEMIA, WITH LONG-TERM CURRENT USE OF INSULIN: ICD-10-CM

## 2023-03-07 DIAGNOSIS — Z79.4 TYPE 2 DIABETES MELLITUS WITH HYPERGLYCEMIA, WITH LONG-TERM CURRENT USE OF INSULIN: ICD-10-CM

## 2023-03-08 LAB
ALBUMIN SERPL-MCNC: 5 G/DL (ref 3.5–5.2)
ALBUMIN/CREAT UR: 8 MG/G CREAT (ref 0–29)
ALBUMIN/GLOB SERPL: 1.7 G/DL
ALP SERPL-CCNC: 85 U/L (ref 39–117)
ALT SERPL-CCNC: 9 U/L (ref 1–33)
AST SERPL-CCNC: 11 U/L (ref 1–32)
BILIRUB SERPL-MCNC: 0.3 MG/DL (ref 0–1.2)
BUN SERPL-MCNC: 17 MG/DL (ref 8–23)
BUN/CREAT SERPL: 16 (ref 7–25)
CALCIUM SERPL-MCNC: 10.7 MG/DL (ref 8.6–10.5)
CHLORIDE SERPL-SCNC: 96 MMOL/L (ref 98–107)
CHOLEST SERPL-MCNC: 136 MG/DL (ref 0–200)
CO2 SERPL-SCNC: 29.6 MMOL/L (ref 22–29)
CREAT SERPL-MCNC: 1.06 MG/DL (ref 0.57–1)
CREAT UR-MCNC: 93.7 MG/DL
EGFRCR SERPLBLD CKD-EPI 2021: 59.5 ML/MIN/1.73
GLOBULIN SER CALC-MCNC: 2.9 GM/DL
GLUCOSE SERPL-MCNC: 105 MG/DL (ref 65–99)
HBA1C MFR BLD: 8.8 % (ref 4.8–5.6)
HDLC SERPL-MCNC: 64 MG/DL (ref 40–60)
IMP & REVIEW OF LAB RESULTS: NORMAL
LDLC SERPL CALC-MCNC: 49 MG/DL (ref 0–100)
MICROALBUMIN UR-MCNC: 7.9 UG/ML
POTASSIUM SERPL-SCNC: 4.5 MMOL/L (ref 3.5–5.2)
PROT SERPL-MCNC: 7.9 G/DL (ref 6–8.5)
SODIUM SERPL-SCNC: 141 MMOL/L (ref 136–145)
T3FREE SERPL-MCNC: 3.1 PG/ML (ref 2–4.4)
T4 FREE SERPL-MCNC: 1.14 NG/DL (ref 0.93–1.7)
TRIGL SERPL-MCNC: 133 MG/DL (ref 0–150)
TSH SERPL DL<=0.005 MIU/L-ACNC: 0.58 UIU/ML (ref 0.27–4.2)
VLDLC SERPL CALC-MCNC: 23 MG/DL (ref 5–40)

## 2023-03-14 ENCOUNTER — PRIOR AUTHORIZATION (OUTPATIENT)
Dept: ENDOCRINOLOGY | Age: 63
End: 2023-03-14
Payer: COMMERCIAL

## 2023-03-27 RX ORDER — INSULIN GLARGINE 300 U/ML
80 INJECTION, SOLUTION SUBCUTANEOUS DAILY
Qty: 24 ML | Refills: 0 | Status: SHIPPED | OUTPATIENT
Start: 2023-03-27 | End: 2023-04-07

## 2023-04-07 ENCOUNTER — OFFICE VISIT (OUTPATIENT)
Dept: ENDOCRINOLOGY | Age: 63
End: 2023-04-07
Payer: COMMERCIAL

## 2023-04-07 VITALS
WEIGHT: 266.4 LBS | HEIGHT: 68 IN | BODY MASS INDEX: 40.38 KG/M2 | TEMPERATURE: 97.7 F | SYSTOLIC BLOOD PRESSURE: 130 MMHG | HEART RATE: 92 BPM | DIASTOLIC BLOOD PRESSURE: 80 MMHG | OXYGEN SATURATION: 93 %

## 2023-04-07 DIAGNOSIS — E11.42 DIABETIC PERIPHERAL NEUROPATHY ASSOCIATED WITH TYPE 2 DIABETES MELLITUS: ICD-10-CM

## 2023-04-07 DIAGNOSIS — E78.5 HYPERLIPIDEMIA ASSOCIATED WITH TYPE 2 DIABETES MELLITUS: ICD-10-CM

## 2023-04-07 DIAGNOSIS — E11.69 HYPERLIPIDEMIA ASSOCIATED WITH TYPE 2 DIABETES MELLITUS: ICD-10-CM

## 2023-04-07 DIAGNOSIS — Z79.4 TYPE 2 DIABETES MELLITUS WITH HYPERGLYCEMIA, WITH LONG-TERM CURRENT USE OF INSULIN: Primary | ICD-10-CM

## 2023-04-07 DIAGNOSIS — E11.65 TYPE 2 DIABETES MELLITUS WITH HYPERGLYCEMIA, WITH LONG-TERM CURRENT USE OF INSULIN: Primary | ICD-10-CM

## 2023-04-07 PROCEDURE — 99214 OFFICE O/P EST MOD 30 MIN: CPT | Performed by: NURSE PRACTITIONER

## 2023-04-07 RX ORDER — BLOOD-GLUCOSE METER
KIT MISCELLANEOUS
COMMUNITY
Start: 2023-01-16

## 2023-04-07 RX ORDER — INSULIN GLARGINE 300 U/ML
INJECTION, SOLUTION SUBCUTANEOUS
Qty: 36 ML | Refills: 1 | Status: SHIPPED | OUTPATIENT
Start: 2023-04-07 | End: 2023-04-10 | Stop reason: SDUPTHER

## 2023-04-07 RX ORDER — FEXOFENADINE HCL 180 MG/1
180 TABLET ORAL DAILY
COMMUNITY

## 2023-04-07 RX ORDER — DULAGLUTIDE 4.5 MG/.5ML
4.5 INJECTION, SOLUTION SUBCUTANEOUS WEEKLY
Qty: 6 ML | Refills: 2 | Status: SHIPPED | OUTPATIENT
Start: 2023-04-07

## 2023-04-07 NOTE — Clinical Note
Please refer to trulicity denial- send them lab results. A1c improved from 10 to 8 on trulicity therefor she is meeting their criteria, resubmit PA if needed

## 2023-04-07 NOTE — PROGRESS NOTES
"Chief Complaint  Diabetes (Type 2: Pt doesn't have meter, checks bs 3 times a day, is up to date on eye exam, no hx of retinopathy, mild neuropathy. Pt states that she is needing needles and strips. )    Subjective        Ashley Malone presents to [facility]  History of Present Illness     I have reviewed PMH, allergies and medications UTD at this visit      Type 2 dm    Diagnosed about 10+ years ago.   Today in clinic pt reports being on trulicity 4.5mg weekly, Toujeo 60u BID, Synjardy XR 12.5- 1000mg BID   FBG -   After meals- 180  Didn't like the inaccuracy of the CGM  Last eye exam - Dec 2022  Dm nephropathy - denies   Dm neuropathy - yes on gabaentin   CAD - denies   CVA - denies   Episodes of hypoglycemia - denies, verbalizes s/s and treatment   On statin and ACE-I       Objective   Vital Signs:  /80   Pulse 92   Temp 97.7 °F (36.5 °C) (Temporal)   Ht 172.7 cm (67.99\")   Wt 121 kg (266 lb 6.4 oz)   SpO2 93%   BMI 40.52 kg/m²   Estimated body mass index is 40.52 kg/m² as calculated from the following:    Height as of this encounter: 172.7 cm (67.99\").    Weight as of this encounter: 121 kg (266 lb 6.4 oz).             Physical Exam  Vitals reviewed.   Constitutional:       General: She is not in acute distress.  HENT:      Head: Normocephalic and atraumatic.   Cardiovascular:      Rate and Rhythm: Normal rate.   Pulmonary:      Effort: Pulmonary effort is normal. No respiratory distress.   Musculoskeletal:         General: No signs of injury. Normal range of motion.      Cervical back: Normal range of motion and neck supple.   Skin:     General: Skin is warm and dry.   Neurological:      Mental Status: She is alert and oriented to person, place, and time. Mental status is at baseline.   Psychiatric:         Mood and Affect: Mood normal.         Behavior: Behavior normal.         Thought Content: Thought content normal.         Judgment: Judgment normal.      "   Result Review :  The following data was reviewed by: SAVANNAH Mosqueda on 04/07/2023:  Common labs    Common Labs 11/9/22 11/9/22 11/9/22 12/14/22 12/14/22 3/7/23 3/7/23 3/7/23 3/7/23    0951 [id number] [id number] [id number] 0953 0953 0953 0953   Glucose 439 (A)    421 (A)   105 (A)    BUN 7 (A)    8   17    Creatinine 0.79    0.63   1.06 (A)    Sodium 133 (A)    135   141    Potassium 4.5    4.4   4.5    Chloride 92 (A)    92 (A)   96 (A)    Calcium 9.4    10.3 (A)   10.7 (A)    Total Protein        7.9    Albumin     4.60   5.0    Total Bilirubin     0.3   0.3    Alkaline Phosphatase     106   85    AST (SGOT)     11   11    ALT (SGPT)     11   9    WBC    10.90 (A)        Hemoglobin    13.6        Hematocrit    43.4        Platelets    326        Total Cholesterol   183    136     Triglycerides   311 (A)    133     HDL Cholesterol   50    64 (A)     LDL Cholesterol    82    49     Hemoglobin A1C  10.10 (A)       8.80 (A)   Microalbumin, Urine      7.9      (A) Abnormal value       Comments are available for some flowsheets but are not being displayed.                        Assessment and Plan   Diagnoses and all orders for this visit:    1. Type 2 diabetes mellitus with hyperglycemia, with long-term current use of insulin (Primary)  -     Basic Metabolic Panel  -     Fructosamine    2. Hyperlipidemia associated with type 2 diabetes mellitus    3. Diabetic peripheral neuropathy associated with type 2 diabetes mellitus    Other orders  -     Dulaglutide (Trulicity) 4.5 MG/0.5ML solution pen-injector; Inject 0.5 mL under the skin into the appropriate area as directed 1 (One) Time Per Week.  Dispense: 6 mL; Refill: 2  -     Insulin Glargine, 2 Unit Dial, (Toujeo Max SoloStar) 300 UNIT/ML solution pen-injector injection; Inject 60 Units under the skin into the appropriate area as directed Daily AND 60 Units Every Night. Do all this for 90 days. 40 units when she wakes up and 40 units before bed  Dispense: 36 mL; Refill: 1  -      glucose blood test strip; Dispense based on insurance preference: Check 4 times a day Dx: E 11.9  Dispense: 300 each; Refill: 4  -     Insulin Pen Needle 32G X 4 MM misc; Use as directed twice daily  Dispense: 100 each; Refill: 5             Follow Up   Return in about 3 months (around 7/7/2023).     BMP today re: noted declined GFR  Hypoglycemia education today  Continue regimen as above  Continue ace and statin   LDL at goal, negative proteinuria     Patient was given instructions and counseling regarding her condition or for health maintenance advice. Please see specific information pulled into the AVS if appropriate.     Mayda Royal APRN

## 2023-04-08 LAB
BUN SERPL-MCNC: 12 MG/DL (ref 8–23)
BUN/CREAT SERPL: 14.3 (ref 7–25)
CALCIUM SERPL-MCNC: 9.8 MG/DL (ref 8.6–10.5)
CHLORIDE SERPL-SCNC: 102 MMOL/L (ref 98–107)
CO2 SERPL-SCNC: 32.7 MMOL/L (ref 22–29)
CREAT SERPL-MCNC: 0.84 MG/DL (ref 0.57–1)
EGFRCR SERPLBLD CKD-EPI 2021: 78.7 ML/MIN/1.73
FRUCTOSAMINE SERPL-SCNC: 226 UMOL/L (ref 0–285)
GLUCOSE SERPL-MCNC: 140 MG/DL (ref 65–99)
POTASSIUM SERPL-SCNC: 4.7 MMOL/L (ref 3.5–5.2)
SODIUM SERPL-SCNC: 141 MMOL/L (ref 136–145)

## 2023-04-10 ENCOUNTER — PRIOR AUTHORIZATION (OUTPATIENT)
Dept: ENDOCRINOLOGY | Age: 63
End: 2023-04-10
Payer: COMMERCIAL

## 2023-04-10 RX ORDER — INSULIN GLARGINE 300 U/ML
INJECTION, SOLUTION SUBCUTANEOUS
Qty: 36 ML | Refills: 1 | Status: SHIPPED | OUTPATIENT
Start: 2023-04-10 | End: 2023-07-09

## 2023-04-11 ENCOUNTER — TELEPHONE (OUTPATIENT)
Dept: ENDOCRINOLOGY | Age: 63
End: 2023-04-11
Payer: COMMERCIAL

## 2023-04-11 NOTE — TELEPHONE ENCOUNTER
4/11/23 Called and left patient a message reg labs.  may relay message to patient.     ----- Message from SAVANNAH Mosqueda sent at 4/11/2023  2:01 PM EDT -----  Kidney function better  Glucose control improving

## 2023-04-19 ENCOUNTER — TELEPHONE (OUTPATIENT)
Dept: GASTROENTEROLOGY | Facility: CLINIC | Age: 63
End: 2023-04-19
Payer: COMMERCIAL

## 2023-04-21 ENCOUNTER — TELEPHONE (OUTPATIENT)
Dept: GASTROENTEROLOGY | Facility: CLINIC | Age: 63
End: 2023-04-21
Payer: COMMERCIAL

## 2023-04-21 PROBLEM — K21.00 GASTROESOPHAGEAL REFLUX DISEASE WITH ESOPHAGITIS WITHOUT HEMORRHAGE: Status: ACTIVE | Noted: 2023-04-21

## 2023-04-21 NOTE — TELEPHONE ENCOUNTER
IMELDA patient via telephone for. Scheduled COLONOSCOPY/EGD 9-8-2023 with arrival time of 12PM. Prep paperwork mailed to verified address on file. Patient advised arrival time may change based on [facility] guidelines. MIRALAX

## 2023-04-24 RX ORDER — BLOOD-GLUCOSE METER
1 KIT MISCELLANEOUS 4 TIMES DAILY
Qty: 1 KIT | Refills: 0 | Status: SHIPPED | OUTPATIENT
Start: 2023-04-24

## 2023-05-03 RX ORDER — ERGOCALCIFEROL 1.25 MG/1
CAPSULE ORAL
Qty: 12 CAPSULE | Refills: 0 | OUTPATIENT
Start: 2023-05-03

## 2023-05-08 ENCOUNTER — SPECIALTY PHARMACY (OUTPATIENT)
Dept: ENDOCRINOLOGY | Age: 63
End: 2023-05-08
Payer: COMMERCIAL

## 2023-05-08 ENCOUNTER — TELEPHONE (OUTPATIENT)
Dept: ENDOCRINOLOGY | Age: 63
End: 2023-05-08
Payer: COMMERCIAL

## 2023-05-08 NOTE — PROGRESS NOTES
Benefits Investigation Summary    Prescription: [facility]     Dispensing pharmacy: Currently at [facility], pricing listed below for [facility]     Copay amount: $25/84 days    BIN: [id number]  PCN: RAHEL Browning, PharmD, MM   Clinical Speciality Pharmacist, Endocrinology   5/8/2023  09:26 EDT

## 2023-05-08 NOTE — TELEPHONE ENCOUNTER
5/8/2023 Called and left patient a message en regards to trulicity.     The [facility] pharmacy can get it for $25 for a 30 days supply.

## 2023-05-09 ENCOUNTER — TRANSCRIBE ORDERS (OUTPATIENT)
Dept: SLEEP MEDICINE | Facility: HOSPITAL | Age: 63
End: 2023-05-09
Payer: COMMERCIAL

## 2023-05-09 ENCOUNTER — SPECIALTY PHARMACY (OUTPATIENT)
Dept: ENDOCRINOLOGY | Age: 63
End: 2023-05-09
Payer: COMMERCIAL

## 2023-05-09 DIAGNOSIS — G47.33 OSA (OBSTRUCTIVE SLEEP APNEA): Primary | ICD-10-CM

## 2023-05-09 RX ORDER — EMPAGLIFLOZIN, METFORMIN HYDROCHLORIDE 12.5; 1 MG/1; MG/1
2 TABLET, EXTENDED RELEASE ORAL
Qty: 180 TABLET | Refills: 1 | Status: SHIPPED | OUTPATIENT
Start: 2023-05-09

## 2023-05-09 RX ORDER — DULAGLUTIDE 4.5 MG/.5ML
0.5 INJECTION, SOLUTION SUBCUTANEOUS
Qty: 6 ML | Refills: 1 | Status: SHIPPED | OUTPATIENT
Start: 2023-05-09

## 2023-05-09 NOTE — PROGRESS NOTES
"Specialty Pharmacy Refill Coordination Note     Ashley \"ABDULAZIZ\" is a 63 y.o. female contacted today regarding refills of  2 specialty medication(s).    Reviewed and verified with patient:       Specialty medication(s) and dose(s) confirmed: yes    Refill Questions    Flowsheet Row Most Recent Value   Changes to allergies? No   Changes to medications? No   New conditions since last clinic visit No   Unplanned office visit, urgent care, ED, or hospital admission in the last 4 weeks  No   How does patient/caregiver feel medication is working? Very good   Financial problems or insurance changes  No   Since the previous refill, were any specialty medication doses or scheduled injections missed or delayed?  No   Does this patient require a clinical escalation to a pharmacist? No          Delivery Questions    Flowsheet Row Most Recent Value   Delivery method Other (Comment)  [[employer] 5/11/23 Delivery 5/12/23]   Delivery address correct? Yes   Delivery phone number [phone]   Number of medications in delivery 2   Medication being filled and delivered Synjardy,Trulicity   Doses left of specialty medications 1 week   Is there any medication that is due not being filled? No   Supplies needed? No supplies needed   Cooler needed? Yes   Do any medications need mixed or dated? No   Copay form of payment Credit card on file   Additional comments $85.00   Questions or concerns for the pharmacist? No   Explain any questions or concerns for the pharmacist N/A   Are any medications first time fills? No   Tracking number for delivery N/A   Shipment status Cooler packed            Medication Adherence    Adherence tools used: alarm  Support network for adherence: family member          Follow-up: 78 day(s)     Radha Rodriguez, Pharmacy Technician  Specialty Pharmacy Technician      "

## 2023-05-09 NOTE — TELEPHONE ENCOUNTER
Specialty Pharmacy Patient Management Program  Prescription Refill Request     Patient currently fills medications at  Pharmacy. Needing refill(s) on the following:      Requested Prescriptions     Pending Prescriptions Disp Refills   • Dulaglutide (Trulicity) 4.5 MG/0.5ML solution pen-injector 6 mL 1     Sig: Inject 0.5 mL under the skin into the appropriate area as directed Every 7 (Seven) Days.   • Empagliflozin-metFORMIN HCl ER (Synjardy XR) 12.5-1000 MG tablet sustained-release 24 hour 180 tablet 1     Sig: Take 2 tablets by mouth Daily With Breakfast.     Last Visit: 4/7, Genny  Next Visit: 7/12, Genny    Pended for [facility] to review, and approve if appropriate.     Lindsay Browning, PharmD, MM   Clinical Speciality Pharmacist, Endocrinology   5/9/2023  09:34 EDT

## 2023-05-10 NOTE — PROGRESS NOTES
Specialty Pharmacy Patient Management Program  Endocrinology Initial Assessment     Ashley Malone is a female seen by an Endocrinology provider for Type 2 Diabetes and enrolled in the Endocrinology Patient Management program offered by [facility].  An initial outreach was conducted, including assessment of therapy appropriateness and specialty medication education for Synjardy and Trulicity. The patient was introduced to services offered by [facility], including: regular assessments, refill coordination, curbside pick-up or mail order delivery options, prior authorization maintenance, and financial assistance programs as applicable. The patient was also provided with contact information for the pharmacy team.     Insurance Coverage & Financial Support  [employer] and copay card    Relevant Past Medical History and Comorbidities  Relevant medical history and concomitant health conditions were discussed with the patient. The patient's chart has been reviewed for relevant past medical history and comorbid conditions and updated as necessary.  Past Medical History:   Diagnosis Date   • Anemia    • Anxiety    • Awareness under anesthesia     X2   • Back pain    • Bulging lumbar disc    • Clostridium difficile colitis 2005   • DDD (degenerative disc disease), lumbar    • Depression    • Dizziness    • Elevated blood sugar level     PT STATES THIS WAS AFTER RECEIVING CORTISONE INJECTIONS IN KNEES   • Frequent UTI     CURRENTLY   • GERD (gastroesophageal reflux disease)    • High cholesterol    • History of anemia    • History of blood transfusion 2016   • History of blood transfusion    • History of Clostridium difficile 2012   • History of snoring    • History of TIAs     LAST ONE 2 YRS AGO   • Hypertension    • IBS (irritable bowel syndrome)    • Infectious mononucleosis    • Left ankle pain    • Leg pain, right    • On home oxygen therapy     2-3L NC AT NIGHT   • HECTOR  (obstructive sleep apnea)    • Osteoporosis    • Pancreas disorder     PT TAKES VICTOZA INJ    • Polycystic ovary syndrome    • PVC's (premature ventricular contractions)    • Restless leg syndrome    • Type 2 diabetes mellitus    • Vitamin D deficiency      Social History     Socioeconomic History   • Marital status:      Spouse name: Jose   • Number of children: 1   Tobacco Use   • Smoking status: Never   • Smokeless tobacco: Never   • Tobacco comments:     I have never smoked but my chart says I do?   Substance and Sexual Activity   • Alcohol use: Not Currently     Alcohol/week: 1.0 standard drink     Types: 1 Cans of beer per week     Comment: RARELY   • Drug use: No   • Sexual activity: Not Currently     Partners: Male     Birth control/protection: Post-menopausal            Allergies  Known allergies and reactions were discussed with the patient. The patient's chart has been reviewed for  allergy information and updated as necessary.   Allergies   Allergen Reactions   • Cortisone Other (See Comments)     TIA-POST INJECTION- KNEES   • Aspirin Buf(Cacarb-Mgcarb-Mgo) Other (See Comments)     Nose bleeds   • Augmentin [Amoxicillin-Pot Clavulanate] GI Intolerance       Allergies reviewed by Mireya Browning [facility] on 5/9/2023 at  9:21 AM    Relevant Laboratory Values  A1C Last 3 Results        8/18/2022    10:48 11/9/2022    09:51 3/7/2023    09:53   HGBA1C Last 3 Results   Hemoglobin A1C 8.1      10.10   8.80         This result is from an external source.     Lab Results   Component Value Date    HGBA1C 8.80 (H) 03/07/2023     Lab Results   Component Value Date    GLUCOSE 140 (H) 04/07/2023    CALCIUM 9.8 04/07/2023     04/07/2023    K 4.7 04/07/2023    CO2 32.7 (H) 04/07/2023     04/07/2023    BUN 12 04/07/2023    CREATININE 0.84 04/07/2023    EGFRIFAFRI 89 09/24/2021    EGFRIFNONA 73 09/24/2021    BCR 14.3 04/07/2023    ANIONGAP 13.8 12/14/2022     Lab Results   Component Value Date    CHLPL  136 03/07/2023    TRIG 133 03/07/2023    HDL 64 (H) 03/07/2023    LDL 49 03/07/2023     Microalbumin        3/7/2023    09:53   Microalbumin   Microalbumin, Urine 7.9         Current Medication List  This medication list has been reviewed with the patient and evaluated for any interactions or necessary modifications/recommendations, and updated to include all prescription medications, OTC medications, and supplements the patient is currently taking.  This list reflects what is contained in the patient's profile, which has also been marked as reviewed to communicate to other providers it is the most up to date version of the patient's current medication therapy.     Current Outpatient Medications:   •  albuterol sulfate  (90 Base) MCG/ACT inhaler, Inhale 2 puffs., Disp: , Rfl:   •  amLODIPine (NORVASC) 10 MG tablet, TAKE 1 TABLET BY MOUTH EVERY DAY AT NIGHT, Disp: , Rfl:   •  Armodafinil (NUVIGIL PO), Take 250 mg by mouth every morning., Disp: , Rfl:   •  Blood Glucose Monitoring Suppl (OneTouch Verio Reflect) w/Device kit, USE 4 TIMES A DAY TO TEST BLOOD SUGAR (NOT COVERED), Disp: , Rfl:   •  Blood Glucose Monitoring Suppl (OneTouch Verio Reflect) w/Device kit, 1 Device by Other route 4 (Four) Times a Day. Dx code E11.9, Disp: 1 kit, Rfl: 0  •  celecoxib (CeleBREX) 200 MG capsule, TAKE 1 CAPSULE BY MOUTH TWICE DAILY AS NEEDED FOR PAIN, Disp: , Rfl:   •  clopidogrel (PLAVIX) 75 MG tablet, Take 1 tablet by mouth Daily., Disp: , Rfl:   •  Diclofenac Sodium (VOLTAREN) 1 % gel gel, Prn and uses sparingly, Disp: , Rfl:   •  Dulaglutide (Trulicity) 4.5 MG/0.5ML solution pen-injector, Inject 0.5 mL under the skin into the appropriate area as directed 1 (One) Time Per Week., Disp: 6 mL, Rfl: 2  •  DULoxetine (CYMBALTA) 60 MG capsule, TAKE 1 CAPSULE BY MOUTH TWICE DAILY, Disp: , Rfl:   •  fexofenadine (ALLEGRA) 180 MG tablet, Take 1 tablet by mouth Daily., Disp: , Rfl:   •  gabapentin (NEURONTIN) 600 MG tablet, Take 1  tablet by mouth 3 (Three) Times a Day., Disp: , Rfl:   •  glucose blood test strip, Dispense based on insurance preference: Check 4 times a day Dx: E 11.9, Disp: 300 each, Rfl: 4  •  Hyoscyamine Sulfate (LEVBID PO), Take 1.25 mg by mouth daily as needed., Disp: , Rfl:   •  Insulin Glargine, 2 Unit Dial, (Toujeo Tor SoloStar) 300 UNIT/ML solution pen-injector injection, Inject 60 Units under the skin into the appropriate area as directed Daily AND 60 Units Every Night. Do all this for 90 days., Disp: 36 mL, Rfl: 1  •  Insulin Pen Needle 32G X 4 MM misc, Use as directed twice daily, Disp: 100 each, Rfl: 5  •  Lancets misc, Dispense based on insurance preference: Check 4 times a day. Dx: E 11.9, Disp: 300 each, Rfl: 4  •  lisinopril (PRINIVIL,ZESTRIL) 40 MG tablet, Take 1 tablet by mouth Daily., Disp: , Rfl:   •  methocarbamol (ROBAXIN) 500 MG tablet, Take 1-2 tablets by mouth Every 8 (Eight) Hours As Needed., Disp: , Rfl:   •  metoprolol succinate XL (TOPROL-XL) 100 MG 24 hr tablet, TAKE 2 TABLETS BY MOUTH EVERY DAY AT NIGHT, Disp: , Rfl:   •  montelukast (SINGULAIR) 10 MG tablet, Take 1 tablet by mouth Every Night., Disp: , Rfl:   •  omeprazole (prilOSEC) 10 MG capsule, Take 1 capsule by mouth Daily., Disp: , Rfl:   •  ondansetron ODT (ZOFRAN-ODT) 4 MG disintegrating tablet, Take 1 tablet by mouth 4 (Four) Times a Day As Needed for Nausea or Vomiting., Disp: 15 tablet, Rfl: 0  •  oxyCODONE-acetaminophen (PERCOCET)  MG per tablet, 1 TABLET AS NEEDED ORAL EVERY 6 HRS 30 DAYS, Disp: , Rfl:   •  rOPINIRole (REQUIP) 1 MG tablet, Take 2 tablets by mouth Every Night., Disp: , Rfl:   •  rosuvastatin (CRESTOR) 40 MG tablet, TAKE 1 TABLET BY MOUTH EVERY DAY, Disp: 90 tablet, Rfl: 3  •  Synjardy XR 12.5-1000 MG tablet sustained-release 24 hour, TAKE 2 TABLETS BY MOUTH DAILY WITH BREAKFAST., Disp: 60 tablet, Rfl: 6  •  traZODone (DESYREL) 100 MG tablet, Take 3 tablets by mouth Every Night., Disp: , Rfl:   •  Dulaglutide  (Trulicity) 4.5 MG/0.5ML solution pen-injector, Inject 0.5 mL under the skin into the appropriate area as directed Every 7 (Seven) Days., Disp: 6 mL, Rfl: 1  •  Empagliflozin-metFORMIN HCl ER (Synjardy XR) 12.5-1000 MG tablet sustained-release 24 hour, Take 2 tablets by mouth Daily With Breakfast., Disp: 180 tablet, Rfl: 1  •  furosemide (LASIX) 20 MG tablet, Take 1 tablet by mouth Daily As Needed., Disp: , Rfl:   •  Topiramate 25 MG/ML solution, Take 1 tablet by mouth 2 (Two) Times a Day. (Patient not taking: Reported on 5/9/2023), Disp: , Rfl:     Medicines reviewed by Mireya Browning RPH on 5/9/2023 at  9:26 AM    Drug Interactions (Per Lexicomp)  · Fexofenadine, percocet, gabapentin, trazodone  · Warn patients and caregivers about the risk of slowed or difficult breathing and/or sedation and monitor patients closely for evidence of excessive CNS depression (ie, respiratory depression, hypotension, sedation, or coma).  · Celecoxib and furosemide   · Monitor for decreased therapeutic effects of loop diuretics  · Celecoxib and diclofenac  · Risk of gastrointestinal (GI) toxicity is increased  · Patient states she is aware and dose not use together, uses both sparingly   · Synjardy XR, Toujo, Trulicity  ·  Monitor patients for hypoglycemia    Recommended Medications Assessment  • Aspirin: Not Taking Currently  • Statin: Currently Taking   • ACEi/ARB: Currently Taking     Initial Education Provided for Specialty Medication  The patient has been provided with the following education and any applicable administration techniques (i.e. self-injection) have been demonstrated for the therapies indicated. All questions and concerns have been addressed prior to the patient receiving the medication, and the patient has verbalized comprehension of the education and any materials provided. Additional patient education shall be provided and documented upon request by the patient, provider, or payer.    Patient has been on both  medications and continuing treatment, switching to have medications filled at [facility].     TRULICITY® (dulaglutide)  Medication Expectations   Why am I taking this medication? You are taking Trulicity, along with diet and exercise, to lower blood sugar because you have type 2 diabetes. Diabetes is not curable but with proper medication and treatment, you can keep your blood sugar within your personalized target range. This medication may also help you lose some weight, and it helps reduce the risk of death from heart attack, and stroke in adults with type 2 diabetes and known heart disease.   What should I expect while on this medication? You should expect to see your blood sugar and A1c decrease over time and you may also lose some weight.   How does the medication work? Trulicity is a non-insulin injection that works with your body's own ability to lower blood sugar and A1c and helps your body release its own insulin in response to your blood sugar rising.  This medication also slows down food from leaving your stomach, making you feel leblanc for longer.   How long will I be on this medication for? The amount of time you will be on this medication will be determined by your doctor based on blood sugar and A1c control. You will most likely be on this medication or another diabetes medication throughout your lifetime. Do not abruptly stop this medication without talking to your doctor first.    How do I take this medication? Take as directed on your prescription label. Trulicity is supplied in a single-use pen for each dose and you will use a new pre-filled pen each week.  It is injected under the skin (subcutaneously) of your stomach, thigh or upper arm.  You may inject in the same body area each week, but make sure to use a different spot each time.  Use this medication once weekly, on the same day each week, with or without food.      First, choose your injection site and clean the area, allowing  it to dry completely.  Make sure the pen is in the locked position and remove the cap by pulling it straight off.    • Turn the pen to the unlocked position and place the clear base firmly against your skin at your injection site.  Press and hold the green button; you will hear a click once the injection starts.    • You will hear a second click when the needle starts retracting.  The injection will take about 5-10 seconds.    • Continue pressing against your skin until the gray plunger is visible, and then you will slowly lift the pen from your skin and dispose of the pen (detailed below in “Medication Storage / Handling”).    What are some possible side effects? You may notice you don't feel as hungry, especially when you first start using Trulicity.  In addition to decreased appetite, the most common side effects are nausea, diarrhea, vomiting, stomach pain, indigestion, and fatigue.  Redness, itching, and/or swelling can occur where the shot was given. You should also monitor for low blood sugar (hypoglycemia), especially if you are taking Trulicity with other medications that cause low blood sugar.    What happens if I miss a dose? If you miss a dose, take it as soon as you remember as long as there are at least 3 days until the next scheduled dose.  If there are less than 3 days, skip the missed dose and resume Trulicity on the regularly scheduled day.  Do not take 2 doses at the same time or extra doses.     Medication Safety   What are things I should warn my doctor immediately about? Do not use Trulicity if you or a family member have ever had medullary thyroid cancer (MTC) or Multiple Endocrine Neoplasia syndrome type 2 (MEN 2).    • Tell your doctor if you get a lump or swelling in your neck, hoarseness, difficulty swallowing, or feel short of breath (these may be symptoms of thyroid cancer).    Tell your doctor if you have or have had problems with your kidneys or pancreas.   • Stop using Trulicity and  get medical help right away if you have severe pain in your stomach area that will not go away as this could be a sign of pancreatitis (inflammation of your pancreas)    Tell your doctor if you have problem digesting food or slowed emptying of your stomach (gastroparesis).      Let your doctor know if you have changes in vision while taking Trulicity or have been diagnosed with diabetic retinopathy.    Talk to your doctor if you are pregnant, planning to become pregnant, or breastfeeding.     Get medical help right away if you notice any signs/symptoms of an allergic reaction (rash, hives, difficulty breathing, etc.).   What are things that I should be cautious of? Be cautious of any side effects from this medication. Talk to your doctor if any new ones develop or aren't getting better.   What are some medications that can interact with this one? Taking Trulicity with other medications that also lower your blood sugar such as insulin and glipizide/glimepiride/glyburide may increase the risk of low blood sugar.  • Your doctor may reduce the dose of these medications when you start Trulicity to minimize low blood sugars    It should not be taken with other medicines called GLP-1 receptor agonists, because these work the same way as Trulicity.      Because Trulicity slows stomach emptying, it can affect the way some medicines work.    Always tell your doctor or pharmacist immediately if you start taking any new medications, including over-the-counter medications, vitamins, and herbal supplements.      Medication Storage/Handling   How should I handle this medication? Keep this medication out of reach of pets/children and keep the pen capped when not in use.  Do not share your medicine pens with others.   How does this medication need to be stored? Store Trulicity in the refrigerator [2°C to 8°C (36°F to 46°F)]; do not freeze and do not use if Trulicity has been frozen.  You may store your Trulicity pens at room  temperature [8°C to 30°C (46°F to 86°F)] for up to 14 days.  Protect from excessive heat and sunlight.  Keep in the original carton until time of administration.   How should I dispose of this medication? Used Trulicity pens should discarded after each use (for single use only). Place your used Trulicity pen in an approved sharps container after use.  If you do not have a sharps container, you may use a household container made of heavy-duty plastic with a tight-fitting lid that is leak resistant (e.g., heavy-duty plastic laundry detergent bottle).      If your doctor decides to stop this medication, take to your local police station for proper disposal. Some pharmacies also have take-back bins for medication drop-off.     Resources/Support   How can I remind myself to take this medication? You can download reminder apps to help you manage your refills. You may also set an alarm on your phone to remind you.    Is financial support available?  [employer] can provide co-pay cards if you have commercial insurance or patient assistance if you have Medicare or no insurance.    Which vaccines are recommended for me? Talk to your doctor about these vaccines:  • Flu   • Coronavirus (COVID-19)   • Pneumococcal (pneumonia)   • Tdap   • Hepatitis B   • Zoster (shingles)      SYNJARDY® (empagliflozin + metformin)  Medication Expectations   Why am I taking this medication? You are taking this medication to lower blood sugar because you have type 2 diabetes. Diabetes is not curable but with proper medication and treatment, we can keep your blood sugar within your personalized target range. Jardiance (empagliflozin), one of the medications in Synjardy, can reduce the risk of death from heart attack or stroke if you also have heart disease.    What should I expect while on this medication? You should expect to see your blood sugar and A1c decrease over time. You may also see a decrease in your blood pressure and it can help some people  lose weight.     How does the medication work? Synjardy works by removing some sugar that the body doesn't need through urination, lowering sugar production in the body, reducing the amount of sugar that enters the bloodstream after a meal, and making your body more sensitive to insulin.     How long will I be on this medication for? The amount of time you will be on this medication will be determined by your doctor based on blood sugar and A1c control. You will most likely be on this medication or another diabetes medication throughout your lifetime. Do not abruptly stop this medication without talking to your doctor first.    How do I take this medication? Take as directed on your prescription label, usually once or twice a day. Take with food.     What are some possible side effects? The most common side effects include urinary tract infections, genital yeast infections, increased urination, diarrhea, gas, nausea and vomiting, stomach pain, indigestion, headache, and stuffy or runny nose and sore throat. Talk with your doctor if you notice white or yellow vaginal discharge, vaginal itching or odor of if you notice redness, itching, pain, or swelling of the penis and/or bad-smelling discharge from the penis.    What happens if I miss a dose? If you miss a dose, take it as soon as you remember. If it is close to your next dose, skip it (do not take 2 doses at once)     Medication Safety   What are things I should warn my doctor immediately about? Tell your doctor if you have kidney disease, liver disease, heart failure, pancreas problems, vitamin B-12 deficiency, or history of frequent genital yeast or urinary tract infections. Tell your doctor if you are on a low-salt diet, if you drink alcohol, or if you are having surgery. Talk to your doctor if you are pregnant, planning to become pregnant, or breastfeeding. If you have irregular periods or none at all but have not gone through menopause, this medication can  cause ovulation and increase the chance of getting pregnant. Also tell your doctor if you notice any signs/symptoms of an allergic reaction (rash, hives, difficulty breathing, etc.).   What are things that I should be cautious of? Be cautious of any side effects from this medication. Talk to your doctor or pharmacist if any new ones develop or aren't getting better.   What are some medications that can interact with this one? This medication can interact with the dye used for an x-ray or CT-scan. Some medications that interact include ranolazine, cimetidine, diuretics (water pills), and other medications that may also lower your blood sugar such as insulins and glipizide/glimepiride/glyburide. Your doctor may reduce the dose of other diabetes medications when you start Synjardy to minimize low blood sugars. Always tell your doctor or pharmacist immediately if you start taking any new medications, including over-the-counter medications, vitamins, and herbal supplements.      Medication Storage/Handling   How should I handle this medication? Keep this medication out of reach of pets/children in tightly sealed container   How does this medication need to be stored? Store at room temperature and keep dry (don't keep in bathroom or other room with moisture)   How should I dispose of this medication? There should not be a need to dispose of this medication unless your provider decides to change the dose or therapy. If that is the case, take to your local police station for proper disposal. Some pharmacies also have take-back bins for medication drop-off.      Resources/Support   How can I remind myself to take this medication? You can download reminder apps to help you manage your refills. You may also set an alarm on your phone to remind you. The pharmacy carries pill boxes that you can place next to an area you pass everyday (such as where you place your car keys or where you charge your phone)   Is financial support  available?  [employer] and [employer] can provide co-pay cards if you have commercial insurance or patient assistance if you have Medicare or no insurance.    Which vaccines are recommended for me? Talk to your doctor about these vaccines: Flu, Coronavirus (COVID-19), Pneumococcal (pneumonia), Tdap, Hepatitis B, Zoster (shingles)          Adherence and Self-Administration  Adherence related to the patient's specialty therapy was discussed with the patient. The Adherence segment of this outreach has been reviewed and updated.     Is there a concern with patient's ability to self administer the medication correctly and without issue?: No  Were any potential barriers to adherence identified during the initial assessment or patient education?: No  Are there any concerns regarding the patient's understanding of the importance of medication adherence?: No  • Methods for Supporting Patient Adherence and/or Self-Administration: None    Goals of Therapy  Goals related to the patient's specialty therapy were discussed with the patient. The Patient Goals segment of this outreach has been reviewed and updated.   Goals     • Specialty Pharmacy General Goal      Reduce HbA1c <7%    • 03/07/23 = 8.8%          Reassessment Plan & Follow-Up  1. Medication Therapy Changes: No changes at this time  2. Related Plans, Therapy Recommendations, or Therapy Problems to Be Addressed: None  3. Pharmacist to perform regular assessments no more than (6) months from the previous assessment.  4. Care Coordinator to set up future refill outreaches, coordinate prescription delivery, and escalate clinical questions to pharmacist.  5. Welcome information and patient satisfaction survey to be sent by specialty pharmacy team with patient's initial fill.    Attestation  Therapeutic appropriateness: Appropriate   If the prescribed therapy is at any point deemed not appropriate based on the current or future assessments, a consultation will be  initiated with the patient's specialty care provider to determine the best course of action. The revised plan of therapy will be documented along with any required assessments and/or additional patient education provided.     Lindsay Browning, PharmD, MM  Clinical Specialty Pharmacist, Endocrinology  5/10/2023  10:29 EDT

## 2023-05-18 ENCOUNTER — HOSPITAL ENCOUNTER (OUTPATIENT)
Dept: SLEEP MEDICINE | Facility: HOSPITAL | Age: 63
End: 2023-05-18
Payer: COMMERCIAL

## 2023-05-18 DIAGNOSIS — G47.33 OSA (OBSTRUCTIVE SLEEP APNEA): ICD-10-CM

## 2023-05-18 PROCEDURE — 95811 POLYSOM 6/>YRS CPAP 4/> PARM: CPT

## 2023-05-31 ENCOUNTER — TELEPHONE (OUTPATIENT)
Dept: SLEEP MEDICINE | Facility: HOSPITAL | Age: 63
End: 2023-05-31

## 2023-06-09 ENCOUNTER — TELEPHONE (OUTPATIENT)
Dept: ONCOLOGY | Facility: CLINIC | Age: 63
End: 2023-06-09
Payer: COMMERCIAL

## 2023-06-09 NOTE — TELEPHONE ENCOUNTER
"  Caller: Ashley Malone \"ABDULAZIZ\"    Relationship to patient: Self    Best call back number: [phone]    Chief complaint: R/S    Type of visit: LAB AND F/U1    Requested date: TUESDAY THROUGH FRIDAY AFTER 9    If rescheduling, when is the original appointment: 6-13          "

## 2023-06-13 ENCOUNTER — PRIOR AUTHORIZATION (OUTPATIENT)
Dept: ENDOCRINOLOGY | Age: 63
End: 2023-06-13
Payer: COMMERCIAL

## 2023-06-13 NOTE — TELEPHONE ENCOUNTER
6/13/2023    PA has been initiated in Covermymeds for the Trulicity 0.75 MG/ 0.5 ML Pen Injectors has been submitted to the patient insurance plan for further review. It is currently still pending a response back from the patient insurance company with an determination.    Key: [id number] - PA Case ID: [id number]

## 2023-07-26 RX ORDER — EMPAGLIFLOZIN, METFORMIN HYDROCHLORIDE 12.5; 1 MG/1; MG/1
2 TABLET, EXTENDED RELEASE ORAL
Qty: 180 TABLET | Refills: 1 | Status: SHIPPED | OUTPATIENT
Start: 2023-07-26

## 2023-07-26 RX ORDER — DULAGLUTIDE 4.5 MG/.5ML
0.5 INJECTION, SOLUTION SUBCUTANEOUS
Qty: 6 ML | Refills: 1 | Status: SHIPPED | OUTPATIENT
Start: 2023-07-26

## 2023-07-28 ENCOUNTER — INFUSION (OUTPATIENT)
Dept: ONCOLOGY | Facility: HOSPITAL | Age: 63
End: 2023-07-28
Payer: COMMERCIAL

## 2023-07-28 VITALS
SYSTOLIC BLOOD PRESSURE: 106 MMHG | HEART RATE: 78 BPM | TEMPERATURE: 97.3 F | DIASTOLIC BLOOD PRESSURE: 69 MMHG | OXYGEN SATURATION: 92 % | RESPIRATION RATE: 16 BRPM | BODY MASS INDEX: 38.27 KG/M2 | WEIGHT: 251.6 LBS

## 2023-07-28 DIAGNOSIS — D50.9 IRON DEFICIENCY ANEMIA, UNSPECIFIED IRON DEFICIENCY ANEMIA TYPE: ICD-10-CM

## 2023-07-28 DIAGNOSIS — T45.4X5A ADVERSE EFFECT OF IRON, INITIAL ENCOUNTER: Primary | ICD-10-CM

## 2023-07-28 PROCEDURE — 96365 THER/PROPH/DIAG IV INF INIT: CPT

## 2023-07-28 PROCEDURE — 25010000002 IRON SUCROSE PER 1 MG: Performed by: NURSE PRACTITIONER

## 2023-07-28 PROCEDURE — 63710000001 DIPHENHYDRAMINE PER 50 MG: Performed by: NURSE PRACTITIONER

## 2023-07-28 RX ORDER — ACETAMINOPHEN 325 MG/1
650 TABLET ORAL ONCE
Status: COMPLETED | OUTPATIENT
Start: 2023-07-28 | End: 2023-07-28

## 2023-07-28 RX ORDER — SODIUM CHLORIDE 9 MG/ML
250 INJECTION, SOLUTION INTRAVENOUS ONCE
Status: COMPLETED | OUTPATIENT
Start: 2023-07-28 | End: 2023-07-28

## 2023-07-28 RX ORDER — DIPHENHYDRAMINE HCL 25 MG
25 CAPSULE ORAL ONCE
Status: COMPLETED | OUTPATIENT
Start: 2023-07-28 | End: 2023-07-28

## 2023-07-28 RX ADMIN — DIPHENHYDRAMINE HYDROCHLORIDE 25 MG: 25 CAPSULE ORAL at 14:12

## 2023-07-28 RX ADMIN — ACETAMINOPHEN 650 MG: 325 TABLET ORAL at 14:12

## 2023-07-28 RX ADMIN — SODIUM CHLORIDE 300 MG: 900 INJECTION, SOLUTION INTRAVENOUS at 14:31

## 2023-07-28 RX ADMIN — SODIUM CHLORIDE 250 ML: 9 INJECTION, SOLUTION INTRAVENOUS at 14:12

## 2023-08-01 RX ORDER — BLOOD-GLUCOSE METER
KIT MISCELLANEOUS
OUTPATIENT
Start: 2023-08-01

## 2023-08-04 ENCOUNTER — INFUSION (OUTPATIENT)
Dept: ONCOLOGY | Facility: HOSPITAL | Age: 63
End: 2023-08-04
Payer: COMMERCIAL

## 2023-08-04 VITALS
DIASTOLIC BLOOD PRESSURE: 81 MMHG | RESPIRATION RATE: 16 BRPM | OXYGEN SATURATION: 94 % | BODY MASS INDEX: 37.75 KG/M2 | TEMPERATURE: 97.3 F | WEIGHT: 248.2 LBS | SYSTOLIC BLOOD PRESSURE: 143 MMHG | HEART RATE: 88 BPM

## 2023-08-04 DIAGNOSIS — T45.4X5A ADVERSE EFFECT OF IRON, INITIAL ENCOUNTER: Primary | ICD-10-CM

## 2023-08-04 DIAGNOSIS — D50.9 IRON DEFICIENCY ANEMIA, UNSPECIFIED IRON DEFICIENCY ANEMIA TYPE: ICD-10-CM

## 2023-08-04 PROCEDURE — 96365 THER/PROPH/DIAG IV INF INIT: CPT

## 2023-08-04 PROCEDURE — 25010000002 IRON SUCROSE PER 1 MG: Performed by: INTERNAL MEDICINE

## 2023-08-04 PROCEDURE — 96366 THER/PROPH/DIAG IV INF ADDON: CPT

## 2023-08-04 PROCEDURE — 63710000001 DIPHENHYDRAMINE PER 50 MG: Performed by: INTERNAL MEDICINE

## 2023-08-04 RX ORDER — ACETAMINOPHEN 325 MG/1
650 TABLET ORAL ONCE
Status: COMPLETED | OUTPATIENT
Start: 2023-08-04 | End: 2023-08-04

## 2023-08-04 RX ORDER — DIPHENHYDRAMINE HCL 25 MG
25 CAPSULE ORAL ONCE
Status: COMPLETED | OUTPATIENT
Start: 2023-08-04 | End: 2023-08-04

## 2023-08-04 RX ORDER — SODIUM CHLORIDE 9 MG/ML
250 INJECTION, SOLUTION INTRAVENOUS ONCE
Status: COMPLETED | OUTPATIENT
Start: 2023-08-04 | End: 2023-08-04

## 2023-08-04 RX ADMIN — ACETAMINOPHEN 650 MG: 325 TABLET ORAL at 09:06

## 2023-08-04 RX ADMIN — SODIUM CHLORIDE 250 ML: 0.9 INJECTION, SOLUTION INTRAVENOUS at 09:11

## 2023-08-04 RX ADMIN — SODIUM CHLORIDE 300 MG: 900 INJECTION, SOLUTION INTRAVENOUS at 09:22

## 2023-08-04 RX ADMIN — DIPHENHYDRAMINE HYDROCHLORIDE 25 MG: 25 CAPSULE ORAL at 09:06

## 2023-08-14 RX ORDER — INSULIN GLARGINE 100 [IU]/ML
80 INJECTION, SOLUTION SUBCUTANEOUS EVERY MORNING
Qty: 75 ML | Refills: 0 | Status: SHIPPED | OUTPATIENT
Start: 2023-08-14 | End: 2023-08-14

## 2023-08-14 NOTE — TELEPHONE ENCOUNTER
Rx Refill Note  Requested Prescriptions     Pending Prescriptions Disp Refills    Insulin Glargine (BASAGLAR KWIKPEN) 100 UNIT/ML injection pen 72 mL 0     Sig: Inject 80 Units under the skin into the appropriate area as directed Every Morning for 90 days.      Last office visit with prescribing clinician: 7/12/2023   Last telemedicine visit with prescribing clinician: Visit date not found   Next office visit with prescribing clinician: 10/18/2023                         Would you like a call back once the refill request has been completed: [] Yes [] No    If the office needs to give you a call back, can they leave a voicemail: [] Yes [] No    Kaitlynn Odom MA  08/14/23, 09:44 EDT

## 2023-08-22 ENCOUNTER — SPECIALTY PHARMACY (OUTPATIENT)
Dept: ENDOCRINOLOGY | Age: 63
End: 2023-08-22
Payer: COMMERCIAL

## 2023-08-22 NOTE — PROGRESS NOTES
"Specialty Pharmacy Refill Coordination Note     Ashley \"ABDULAZIZ\" is a 63 y.o. female contacted today regarding refills of  2 specialty medication(s).    Reviewed and verified with patient:       Specialty medication(s) and dose(s) confirmed: yes    Refill Questions      Flowsheet Row Most Recent Value   Changes to allergies? No   Changes to medications? No   New conditions since last clinic visit No   Unplanned office visit, urgent care, ED, or hospital admission in the last 4 weeks  No   How does patient/caregiver feel medication is working? Good   Financial problems or insurance changes  No   Since the previous refill, were any specialty medication doses or scheduled injections missed or delayed?  No   Does this patient require a clinical escalation to a pharmacist? No            Delivery Questions      Flowsheet Row Most Recent Value   Delivery method Other (Comment)  [BEE LINE SHIP 8/23/23 DELIVERY 8/24/23]   Delivery address correct? Yes   Delivery phone number [phone]   Number of medications in delivery 2   Medication being filled and delivered SYNJARDY, TRULICITY   Doses left of specialty medications 1 WEEK   Is there any medication that is due not being filled? No   Supplies needed? No supplies needed   Cooler needed? Yes   Do any medications need mixed or dated? No   Copay form of payment Credit card on file   Additional comments $35.00   Questions or concerns for the pharmacist? No   Explain any questions or concerns for the pharmacist N/A   Are any medications first time fills? No   Tracking number for delivery N/A   Shipment status Cooler packed              Medication Adherence    Adherence tools used: alarm  Support network for adherence: family member          Follow-up: 77 day(s)     Radha Rodriguez, Pharmacy Technician  Specialty Pharmacy Technician        "

## 2023-08-23 NOTE — PROGRESS NOTES
"Specialty Pharmacy Refill Coordination Note     Ashley \"ABDULAZIZ\" is a 63 y.o. female contacted today regarding refills of  2 specialty medication(s).    Reviewed and verified with patient:       Specialty medication(s) and dose(s) confirmed: yes              Medication Adherence    Adherence tools used: alarm  Support network for adherence: family member          Follow-up: 77 day(s)     Radha Rodriguez, Pharmacy Technician  Specialty Pharmacy Technician        "

## 2023-08-31 ENCOUNTER — SPECIALTY PHARMACY (OUTPATIENT)
Dept: ENDOCRINOLOGY | Age: 63
End: 2023-08-31
Payer: COMMERCIAL

## 2023-08-31 ENCOUNTER — SPECIALTY PHARMACY (OUTPATIENT)
Facility: HOSPITAL | Age: 63
End: 2023-08-31
Payer: COMMERCIAL

## 2023-08-31 NOTE — PROGRESS NOTES
"Specialty Pharmacy Refill Coordination Note     Ashley \"ABDULAZIZ\" is a 63 y.o. female contacted today regarding refills of  1 specialty medication(s).    Reviewed and verified with patient:       Specialty medication(s) and dose(s) confirmed: yes    Refill Questions      Flowsheet Row Most Recent Value   Changes to allergies? No   Changes to medications? Yes  [TOUJEO MAX SOLOSTAR]   New conditions since last clinic visit No   Unplanned office visit, urgent care, ED, or hospital admission in the last 4 weeks  No   How does patient/caregiver feel medication is working? Good   Financial problems or insurance changes  No   Since the previous refill, were any specialty medication doses or scheduled injections missed or delayed?  No   Does this patient require a clinical escalation to a pharmacist? Yes            Delivery Questions      Flowsheet Row Most Recent Value   Delivery method Other (Comment)  [BEE LINE SHIP 9/5/23 DELIVERY 9/6/23]   Delivery address correct? Yes   Delivery phone number [phone]   Number of medications in delivery 1   Medication being filled and delivered TOUJEO MAX SOLOSTAR   Doses left of specialty medications N/A   Is there any medication that is due not being filled? No   Supplies needed? No supplies needed   Cooler needed? Yes   Do any medications need mixed or dated? No   Copay form of payment Credit card on file   Additional comments $60.00   Questions or concerns for the pharmacist? No   Explain any questions or concerns for the pharmacist N/A   Are any medications first time fills? No   Tracking number for delivery N/A   Shipment status Cooler packed              Medication Adherence    Adherence tools used: alarm  Support network for adherence: family member          Follow-up: 83 day(s)     Radha Rodriguez, Pharmacy Technician  Specialty Pharmacy Technician        "

## 2023-08-31 NOTE — PROGRESS NOTES
Specialty Pharmacy Patient Management Program  One-Time Clinical Outreach     Ashley Malone is a 63 y.o. female seen by an Endocrinology provider for Type 2 Diabetes and enrolled in the Endocrinology Patient Management program offered by [facility].      Pharmacist escalation outreach scheduled as patient switching from Basaglar to Toujeo. Spoke with patient, and she was aware of the medication change and correct dosing instructions. She reports that she has been on Toujeo in the past and did not require any additional education.     Joy Henry, PharmD, BCPS, BCCCP  Clinical Specialty Pharmacist, Endocrinology  8/31/2023  16:07 EDT

## 2023-09-07 ENCOUNTER — TELEPHONE (OUTPATIENT)
Dept: GASTROENTEROLOGY | Facility: CLINIC | Age: 63
End: 2023-09-07
Payer: COMMERCIAL

## 2023-09-07 RX ORDER — BLOOD-GLUCOSE METER
KIT MISCELLANEOUS
Qty: 1 KIT | Refills: 1 | Status: SHIPPED | OUTPATIENT
Start: 2023-09-07

## 2023-09-07 NOTE — TELEPHONE ENCOUNTER
"  Hub staff attempted to follow warm transfer process and was unsuccessful     Caller: Ashley Malone \"ABDULAZIZ\"    Relationship to patient: Self    Best call back number: [phone]     Patient is needing: CANCEL COLONOSCOPY SCHEDULED FOR TOMORROW 9/8/23. PT TESTED POSITIVE FOR COVID THIS MORNING. PLEASE FOLLOW UP TO RESCHEDULE.         "

## 2023-10-18 ENCOUNTER — PRIOR AUTHORIZATION (OUTPATIENT)
Dept: ENDOCRINOLOGY | Age: 63
End: 2023-10-18

## 2023-10-18 ENCOUNTER — OFFICE VISIT (OUTPATIENT)
Dept: ENDOCRINOLOGY | Age: 63
End: 2023-10-18
Payer: COMMERCIAL

## 2023-10-18 VITALS
DIASTOLIC BLOOD PRESSURE: 90 MMHG | HEIGHT: 68 IN | SYSTOLIC BLOOD PRESSURE: 130 MMHG | OXYGEN SATURATION: 96 % | TEMPERATURE: 96.6 F | WEIGHT: 253.8 LBS | BODY MASS INDEX: 38.46 KG/M2 | HEART RATE: 85 BPM

## 2023-10-18 DIAGNOSIS — Z79.4 TYPE 2 DIABETES MELLITUS WITH HYPERGLYCEMIA, WITH LONG-TERM CURRENT USE OF INSULIN: Primary | ICD-10-CM

## 2023-10-18 DIAGNOSIS — E78.5 HYPERLIPIDEMIA ASSOCIATED WITH TYPE 2 DIABETES MELLITUS: ICD-10-CM

## 2023-10-18 DIAGNOSIS — E11.69 HYPERLIPIDEMIA ASSOCIATED WITH TYPE 2 DIABETES MELLITUS: ICD-10-CM

## 2023-10-18 DIAGNOSIS — E11.65 TYPE 2 DIABETES MELLITUS WITH HYPERGLYCEMIA, WITH LONG-TERM CURRENT USE OF INSULIN: Primary | ICD-10-CM

## 2023-10-18 DIAGNOSIS — E11.42 DIABETIC PERIPHERAL NEUROPATHY ASSOCIATED WITH TYPE 2 DIABETES MELLITUS: ICD-10-CM

## 2023-10-18 PROCEDURE — 99214 OFFICE O/P EST MOD 30 MIN: CPT | Performed by: NURSE PRACTITIONER

## 2023-10-18 RX ORDER — INSULIN DETEMIR 100 [IU]/ML
80 INJECTION, SOLUTION SUBCUTANEOUS NIGHTLY
COMMUNITY
End: 2023-10-18

## 2023-10-18 RX ORDER — ACYCLOVIR 400 MG/1
1 TABLET ORAL
Qty: 9 EACH | Refills: 1 | Status: SHIPPED | OUTPATIENT
Start: 2023-10-18

## 2023-10-18 RX ORDER — GLIMEPIRIDE 4 MG/1
TABLET ORAL
COMMUNITY
End: 2023-10-18

## 2023-10-18 RX ORDER — ROPINIROLE 1 MG/1
TABLET, FILM COATED ORAL
COMMUNITY

## 2023-10-18 NOTE — PROGRESS NOTES
"Chief Complaint  Diabetes (Did not get started on dexcom.)    Subjective        Ashley Malone presents to [facility] ENDOCRINOLOGY  History of Present Illness    Type 2 dm    Diagnosed about 10+ years ago   Today in clinic pt reports being on trulicity 4.5mg weekly, Toujeo 40u BID (did not change to 80u daily), Synjardy XR 12.5- 1000mg daily- no recent UTIs  Dexcom- needing PA   Last eye exam - 4/2023  Dm neuropathy - yes   Episodes of hypoglycemia - none reported  On statin and ACE-I       Objective   Vital Signs:  /90   Pulse 85   Temp 96.6 °F (35.9 °C) (Temporal)   Ht 172.7 cm (67.99\")   Wt 115 kg (253 lb 12.8 oz)   SpO2 96%   BMI 38.60 kg/m²   Estimated body mass index is 38.6 kg/m² as calculated from the following:    Height as of this encounter: 172.7 cm (67.99\").    Weight as of this encounter: 115 kg (253 lb 12.8 oz).             Physical Exam  Vitals reviewed.   Constitutional:       General: She is not in acute distress.  HENT:      Head: Normocephalic and atraumatic.   Cardiovascular:      Rate and Rhythm: Normal rate.   Pulmonary:      Effort: Pulmonary effort is normal. No respiratory distress.   Musculoskeletal:         General: No signs of injury. Normal range of motion.      Cervical back: Normal range of motion and neck supple.   Skin:     General: Skin is warm and dry.   Neurological:      Mental Status: She is alert and oriented to person, place, and time. Mental status is at baseline.   Psychiatric:         Mood and Affect: Mood normal.         Behavior: Behavior normal.         Thought Content: Thought content normal.         Judgment: Judgment normal.          Result Review :  The following data was reviewed by: SAVANNAH Mosqueda on 10/18/2023:  Common labs          4/7/2023    11:17 6/27/2023    09:21 6/27/2023    12:24 10/11/2023    08:42   Common Labs   Glucose 140  99      BUN 12  14      Creatinine 0.84  0.95      Sodium 141  140      Potassium 4.7  4.1   "    Chloride 102  101      Calcium 9.8  9.5      Albumin  4.3      Total Bilirubin  0.3      Alkaline Phosphatase  71      AST (SGOT)  22      ALT (SGPT)  18      WBC  13.74      Hemoglobin  13.4      Hematocrit  43.1      Platelets  319      Hemoglobin A1C   6.40  7.00                   Assessment and Plan   Diagnoses and all orders for this visit:    1. Type 2 diabetes mellitus with hyperglycemia, with long-term current use of insulin (Primary)  -     Hemoglobin A1c; Future  -     Comprehensive Metabolic Panel; Future  -     Lipid Panel; Future  -     Microalbumin / Creatinine Urine Ratio - Urine, Clean Catch; Future    2. Hyperlipidemia associated with type 2 diabetes mellitus    3. Diabetic peripheral neuropathy associated with type 2 diabetes mellitus    Other orders  -     Continuous Blood Gluc Sensor (Dexcom G7 Sensor) misc; Use 1 each Every 10 (Ten) Days.  Dispense: 9 each; Refill: 1             Follow Up   Return in about 4 months (around 2/18/2024).    PA dexcom  A1c slightly up, resume toujeo at 80u daily  Ensure synjardy one tablet daily, continue trulicity    Continue ace and statin     Patient was given instructions and counseling regarding her condition or for health maintenance advice. Please see specific information pulled into the AVS if appropriate.     SAVANNAH Mosqueda

## 2023-10-20 ENCOUNTER — PRIOR AUTHORIZATION (OUTPATIENT)
Dept: ENDOCRINOLOGY | Age: 63
End: 2023-10-20
Payer: COMMERCIAL

## 2023-10-20 NOTE — TELEPHONE ENCOUNTER
RECEIVED FAX FROM [facility] REGARDING PA REQUEST FOR DEXCOM G7 SENSOR

## 2023-10-23 NOTE — TELEPHONE ENCOUNTER
10/23/23 Tried to do pa through the phone for worker's comp and they contacted pharmacy and waiting for a response

## 2023-10-26 ENCOUNTER — TRANSCRIBE ORDERS (OUTPATIENT)
Dept: ADMINISTRATIVE | Facility: HOSPITAL | Age: 63
End: 2023-10-26
Payer: COMMERCIAL

## 2023-10-26 ENCOUNTER — LAB (OUTPATIENT)
Dept: LAB | Facility: HOSPITAL | Age: 63
End: 2023-10-26
Payer: COMMERCIAL

## 2023-10-26 DIAGNOSIS — Z51.81 MEDICATION MONITORING ENCOUNTER: Primary | ICD-10-CM

## 2023-10-26 DIAGNOSIS — Z51.81 MEDICATION MONITORING ENCOUNTER: ICD-10-CM

## 2023-10-26 LAB
AMPHET+METHAMPHET UR QL: NEGATIVE
BARBITURATES UR QL SCN: NEGATIVE
BENZODIAZ UR QL SCN: NEGATIVE
CANNABINOIDS SERPL QL: NEGATIVE
COCAINE UR QL: NEGATIVE
FENTANYL UR-MCNC: NEGATIVE NG/ML
METHADONE UR QL SCN: NEGATIVE
OPIATES UR QL: NEGATIVE
OXYCODONE UR QL SCN: POSITIVE

## 2023-10-26 PROCEDURE — 80307 DRUG TEST PRSMV CHEM ANLYZR: CPT

## 2023-10-31 ENCOUNTER — TELEPHONE (OUTPATIENT)
Dept: ENDOCRINOLOGY | Age: 63
End: 2023-10-31
Payer: COMMERCIAL

## 2023-11-10 RX ORDER — EMPAGLIFLOZIN, METFORMIN HYDROCHLORIDE 12.5; 1 MG/1; MG/1
1 TABLET, EXTENDED RELEASE ORAL DAILY
Qty: 90 TABLET | Refills: 1 | Status: SHIPPED | OUTPATIENT
Start: 2023-11-10

## 2023-11-10 NOTE — TELEPHONE ENCOUNTER
Specialty Pharmacy Patient Management Program  Prescription Refill Request     Patient currently fills medications at  Pharmacy. Needing refill(s) on the following:      Requested Prescriptions     Pending Prescriptions Disp Refills    Empagliflozin-metFORMIN HCl ER (Synjardy XR) 12.5-1000 MG tablet sustained-release 24 hour 90 tablet 1     Sig: Take 1 tablet by mouth Daily.     Last Visit: 10/18Genny  Next Visit: 2/23, Genny    Pended for SAVANNAH Mosqueda to review, and approve if appropriate.     Lindsay Browning, PharmD, MM   Clinical Speciality Pharmacist, Endocrinology   11/10/2023  09:15 EST

## 2023-11-17 RX ORDER — BLOOD-GLUCOSE METER
EACH MISCELLANEOUS
Qty: 200 EACH | Refills: 7 | Status: SHIPPED | OUTPATIENT
Start: 2023-11-17

## 2023-11-17 NOTE — TELEPHONE ENCOUNTER
Rx Refill Note  Requested Prescriptions     Pending Prescriptions Disp Refills    glucose blood (OneTouch Verio) test strip [Pharmacy Med Name: ONE TOUCH VERIO TEST STRIP] 200 each 7     Sig: USE 4 TIMES DAILY AS DIRECTED      Last office visit with prescribing clinician: 10/18/2023   Last telemedicine visit with prescribing clinician: Visit date not found   Next office visit with prescribing clinician: 2/23/2024                         Would you like a call back once the refill request has been completed: [] Yes [] No    If the office needs to give you a call back, can they leave a voicemail: [] Yes [] No    Nicole Kirkpatrick  11/17/23, 07:45 EST

## 2023-11-20 ENCOUNTER — TELEPHONE (OUTPATIENT)
Dept: ENDOCRINOLOGY | Age: 63
End: 2023-11-20
Payer: COMMERCIAL

## 2023-11-20 ENCOUNTER — SPECIALTY PHARMACY (OUTPATIENT)
Dept: ENDOCRINOLOGY | Age: 63
End: 2023-11-20
Payer: COMMERCIAL

## 2023-11-20 NOTE — PROGRESS NOTES
Specialty Pharmacy Patient Management Program  Endocrinology Reassessment     Ashley Malone was referred by an Endocrinology provider to the Endocrinology Patient Management program offered by [facility] for Type 2 Diabetes. A follow-up outreach was conducted, including assessment of continued therapy appropriateness, medication adherence, and side effect incidence and management for Synjardy, Toujeo, and Trulicity.    Changes to Insurance Coverage or Financial Support  [employer] and Cop[employer] cards    Relevant Past Medical History and Comorbidities  Relevant medical history and concomitant health conditions were discussed with the patient. The patient's chart has been reviewed for relevant past medical history and comorbid health conditions and updated as necessary.   Past Medical History:   Diagnosis Date    Anemia     Anxiety     Awareness under anesthesia     X2    Back pain     Bulging lumbar disc     Clostridium difficile colitis 2005    DDD (degenerative disc disease), lumbar     Depression     Dizziness     Elevated blood sugar level     PT STATES THIS WAS AFTER RECEIVING CORTISONE INJECTIONS IN KNEES    Frequent UTI     CURRENTLY    GERD (gastroesophageal reflux disease)     High cholesterol     History of anemia     History of blood transfusion 2016    History of blood transfusion     History of Clostridium difficile 2012    History of snoring     History of TIAs     LAST ONE 2 YRS AGO    Hypertension     IBS (irritable bowel syndrome)     Infectious mononucleosis     Left ankle pain     Leg pain, right     On home oxygen therapy     2-3L NC AT NIGHT    HECTOR (obstructive sleep apnea)     Osteoporosis     Pancreas disorder     PT TAKES VICTOZA INJ     Polycystic ovary syndrome     PVC's (premature ventricular contractions)     Restless leg syndrome     Type 2 diabetes mellitus     Vitamin D deficiency      Social History     Socioeconomic History    Marital status:       Spouse name: Jose    Number of children: 1   Tobacco Use    Smoking status: Never    Smokeless tobacco: Never    Tobacco comments:     I have never smoked but my chart says I do?   Substance and Sexual Activity    Alcohol use: Not Currently     Alcohol/week: 1.0 standard drink of alcohol     Types: 1 Cans of beer per week     Comment: RARELY    Drug use: No    Sexual activity: Not Currently     Partners: Male     Birth control/protection: Post-menopausal     Problem list reviewed by Mireya Browning RPH on 11/20/2023 at  1:44 PM    Hospitalizations and Urgent Care Since Last Assessment  ED Visits, Admissions, or Hospitalizations: None  Urgent Office Visits: None    Allergies  Known allergies and reactions were discussed with the patient. The patient's chart has been reviewed for allergy information and updated as necessary.   Allergies   Allergen Reactions    Cortisone Other (See Comments) and Unknown - High Severity     TIA-POST INJECTION- KNEES    Aspirin Buf(Cacarb-Mgcarb-Mgo) Other (See Comments)     Nose bleeds    Augmentin [Amoxicillin-Pot Clavulanate] GI Intolerance     Allergies reviewed by Mireya Browning RPH on 11/20/2023 at 12:06 PM    Relevant Laboratory Values  Relevant laboratory values were discussed with the patient. The following specialty medication dose adjustment(s) are recommended: No adjustments needed at this time.   A1C Last 3 Results          3/7/2023    09:53 6/27/2023    12:24 10/11/2023    08:42   HGBA1C Last 3 Results   Hemoglobin A1C 8.80  6.40  7.00      Lab Results   Component Value Date    HGBA1C 7.00 (H) 10/11/2023     Lab Results   Component Value Date    GLUCOSE 99 06/27/2023    CALCIUM 9.5 06/27/2023     06/27/2023    K 4.1 06/27/2023    CO2 26.4 06/27/2023     06/27/2023    BUN 14 06/27/2023    CREATININE 0.95 06/27/2023    EGFRIFAFRI 89 09/24/2021    EGFRIFNONA 73 09/24/2021    BCR 14.7 06/27/2023    ANIONGAP 12.6 06/27/2023     Lab Results   Component Value Date     CHLPL 136 03/07/2023    TRIG 133 03/07/2023    HDL 64 (H) 03/07/2023    LDL 49 03/07/2023     Microalbumin          3/7/2023    09:53   Microalbumin   Microalbumin, Urine 7.9      Current Medication List  This medication list has been reviewed with the patient and evaluated for any interactions or necessary modifications/recommendations, and updated to include all prescription medications, OTC medications, and supplements the patient is currently taking.  This list reflects what is contained in the patient's profile, which has also been marked as reviewed to communicate to other providers it is the most up to date version of the patient's current medication therapy.     Current Outpatient Medications:     albuterol sulfate  (90 Base) MCG/ACT inhaler, Inhale 2 puffs., Disp: , Rfl:     amLODIPine (NORVASC) 10 MG tablet, TAKE 1 TABLET BY MOUTH EVERY DAY AT NIGHT, Disp: , Rfl:     Armodafinil (NUVIGIL PO), Take 250 mg by mouth every morning., Disp: , Rfl:     Blood Glucose Monitoring Suppl (OneTouch Verio Reflect) w/Device kit, USE FOUR TIMES A DAY AS DIRECTED, Disp: 1 kit, Rfl: 1    celecoxib (CeleBREX) 200 MG capsule, 2QD, Disp: , Rfl:     clopidogrel (PLAVIX) 75 MG tablet, Take 1 tablet by mouth Daily., Disp: , Rfl:     Diclofenac Sodium (VOLTAREN) 1 % gel gel, Prn and uses sparingly, Disp: , Rfl:     Dulaglutide (Trulicity) 4.5 MG/0.5ML solution pen-injector, Inject 0.5 mL under the skin into the appropriate area as directed Every 7 (Seven) Days., Disp: 6 mL, Rfl: 1    DULoxetine (CYMBALTA) 60 MG capsule, TAKE 1 CAPSULE BY MOUTH TWICE DAILY, Disp: , Rfl:     Empagliflozin-metFORMIN HCl ER (Synjardy XR) 12.5-1000 MG tablet sustained-release 24 hour, Take 1 tablet by mouth Daily With Breakfast., Disp: 90 tablet, Rfl: 1    Empagliflozin-metFORMIN HCl ER (Synjardy XR) 12.5-1000 MG tablet sustained-release 24 hour, Take 1 tablet by mouth Daily., Disp: 90 tablet, Rfl: 1    fexofenadine (ALLEGRA) 180 MG tablet,  Take 1 tablet by mouth Daily., Disp: , Rfl:     gabapentin (NEURONTIN) 600 MG tablet, Take 1 tablet by mouth 3 (Three) Times a Day., Disp: , Rfl:     glucose blood (OneTouch Verio) test strip, USE 4 TIMES DAILY AS DIRECTED, Disp: 200 each, Rfl: 7    Hyoscyamine Sulfate (LEVBID PO), Take 1.25 mg by mouth daily as needed., Disp: , Rfl:     Insulin Glargine, 2 Unit Dial, (Toujeo Max SoloStar) 300 UNIT/ML solution pen-injector injection, Inject 80 Units under the skin into the appropriate area as directed Daily., Disp: 24 mL, Rfl: 1    Insulin Pen Needle 32G X 4 MM misc, Use as directed twice daily, Disp: 100 each, Rfl: 5    Lancets misc, Dispense based on insurance preference: Check 4 times a day. Dx: E 11.9, Disp: 300 each, Rfl: 4    lisinopril (PRINIVIL,ZESTRIL) 40 MG tablet, Take 1 tablet by mouth Daily., Disp: , Rfl:     methocarbamol (ROBAXIN) 500 MG tablet, Take 1-2 tablets by mouth Every 8 (Eight) Hours As Needed., Disp: , Rfl:     metoprolol succinate XL (TOPROL-XL) 100 MG 24 hr tablet, TAKE 2 TABLETS BY MOUTH EVERY DAY AT NIGHT, Disp: , Rfl:     montelukast (SINGULAIR) 10 MG tablet, Take 1 tablet by mouth Every Night., Disp: , Rfl:     nitrofurantoin, macrocrystal-monohydrate, (MACROBID) 100 MG capsule, As Needed., Disp: , Rfl:     omeprazole (prilOSEC) 10 MG capsule, Take 1 capsule by mouth Daily., Disp: , Rfl:     ondansetron ODT (ZOFRAN-ODT) 4 MG disintegrating tablet, Take 1 tablet by mouth 4 (Four) Times a Day As Needed for Nausea or Vomiting., Disp: 15 tablet, Rfl: 0    oxyCODONE-acetaminophen (PERCOCET)  MG per tablet, 1 TABLET AS NEEDED ORAL EVERY 6 HRS 30 DAYS, Disp: , Rfl:     rOPINIRole (REQUIP) 1 MG tablet, TAKE 2 TABLETS BY MOUTH AT BEDTIME Oral for 90, Disp: , Rfl:     rosuvastatin (CRESTOR) 40 MG tablet, TAKE 1 TABLET BY MOUTH EVERY DAY, Disp: 90 tablet, Rfl: 3    Topiramate 25 MG/ML solution, Take 1 tablet by mouth 2 (Two) Times a Day., Disp: , Rfl:     traZODone (DESYREL) 100 MG tablet,  Take 3 tablets by mouth Every Night., Disp: , Rfl:     Continuous Blood Gluc Sensor (Dexcom G7 Sensor) misc, Use 1 each Every 10 (Ten) Days. (Patient not taking: Reported on 11/20/2023), Disp: 9 each, Rfl: 1    furosemide (LASIX) 20 MG tablet, Take 1 tablet by mouth Daily As Needed., Disp: , Rfl:     Medicines reviewed by Mireya Browning RP on 11/20/2023 at 12:08 PM    Drug Interactions  Omeprazole and clopidogrel  Clopidogrel prescribing information recommends avoiding concurrent use with omeprazole due to the possibility that combined use may result in decreased clopidogrel effectiveness.   Celecoxib and furosemide   Monitor for decreased therapeutic effects of loop diuretics with concurrent use of a nonsteroidal anti-inflammatory agent (NSAID).   Celecoxib and diclofenac  monitor for increased NSAID toxicities.  Percocet with fexofenadine, gabapentin, hyoscyamine, methocarbamol, topiramate and trazodone  Warn patients and caregivers about the risk of slowed or difficult breathing and/or sedation and monitor patients closely for evidence of excessive CNS depression (ie, respiratory depression, hypotension, sedation, or coma).  Synjardy, Toujeo and Trulicity   Monitor patients for hypoglycemia.    Recommended Medications Assessment  Aspirin: Contraindicated (allergy)  Statin: Currently Taking   ACEi/ARB: Currently Taking     Adverse Drug Reactions  Medication tolerability: Tolerating with no to minimal ADRs  Medication plan: Continue therapy with normal follow-up  Plan for ADR Management: None    Adherence, Self-Administration, and Current Therapy Problems  Adherence related to the patient's specialty therapy was discussed with the patient. The Adherence segment of this outreach has been reviewed and updated.     Adherence Questions  Linked Medication(s) Assessed: Dulaglutide, Empagliflozin-Metformin Hcl, Insulin Glargine  On average, how many doses/injections does the patient miss per month?: 1 (Does not miss  Trulicity)  What are the identified reasons for non-adherence or missed doses? : no problems identified  What is the estimated medication adherence level?: % (Per patient and proportion of days covered data provided)  Based on the patient/caregiver response and refill history, does this patient require an MTP to track adherence improvements?: no    Additional Barriers to Patient Self-Administration: None identified or disclosed at this time   Methods for Supporting Patient Self-Administration: Continue to follow with fills and clinical assessments     Open Medication Therapy Problems  No medication therapy recommendations to display    Goals of Therapy  Goals related to the patient's specialty therapy were discussed with the patient. The Patient Goals segment of this outreach has been reviewed and updated.   Goals Addressed Today        Specialty Pharmacy General Goal      A1c < 7%    Lab Results   Component Value Date    HGBA1C 7.00 (H) 10/11/2023    HGBA1C 6.40 (H) 06/27/2023    HGBA1C 8.80 (H) 03/07/2023    HGBA1C 10.10 (H) 11/09/2022    HGBA1C 8.1 (H) 08/18/2022                  Quality of Life Assessment   Quality of Life related to the patient's enrollment in the patient management program and services provided was discussed with the patient. The QOL segment of this outreach has been reviewed and updated.  Quality of Life Improvement Scale: Somewhat better    Reassessment Plan & Follow-Up  1. Medication Therapy Changes: No changes. Continue Trulicity 4.5 mg weekly, Toujeo Max 80 units daily and Synjardy XR 12.5-1000 mg daily.   2. Related Plans, Therapy Recommendations, or Issues to Be Addressed: None  3. Pharmacist to perform regular assessments no more than (6) months from the previous assessment.  4. Care Coordinator to set up future refill outreaches, coordinate prescription delivery, and escalate clinical questions to pharmacist.    Attestation  Therapeutic appropriateness: Appropriate   I attest  "the patient was actively involved in and has agreed to the above plan of care.  If the prescribed therapy is at any point deemed not appropriate based on the current or future assessments, a consultation will be initiated with the patient's specialty care provider to determine the best course of action. The revised plan of therapy will be documented along with any required assessments and/or additional patient education provided.     Lindsay Browning, Eyad, MM  Clinical Specialty Pharmacist, Endocrinology  11/20/2023  14:09 [facility]       Specialty Pharmacy Patient Management Program  Endocrinology Refill Outreach      Ashley \"MANUEL" is a 63 y.o. female contacted today regarding refills of her medication(s).    Specialty medication(s) and dose(s) confirmed: rulicity 4.5 mg weekly, Toujeo Max 80 units daily and Synjardy XR 12.5-1000 mg daily.   Other medication(s) being refilled: N/A    Refill Questions      Flowsheet Row Most Recent Value   Changes to allergies? No   Changes to medications? No   New conditions since last clinic visit No   Unplanned office visit, urgent care, ED, or hospital admission in the last 4 weeks  No   How does patient/caregiver feel medication is working? Good   Financial problems or insurance changes  No   Since the previous refill, were any specialty medication doses or scheduled injections missed or delayed?  No   Does this patient require a clinical escalation to a pharmacist? No          Delivery Questions      Flowsheet Row Most Recent Value   Delivery method Other (Comment)  [Beeline]   Delivery address correct? Yes   Delivery phone number [phone]   Number of medications in delivery 3   Medication(s) being filled and delivered Dulaglutide, Empagliflozin-Metformin Hcl, Insulin Glargine   Doses left of specialty medications About a week   Is there any medication that is due not being filled? No   Supplies needed? No supplies needed   Cooler needed? Yes   Do any medications need mixed or " dated? No   Copay form of payment Credit card on file   Additional comments $45   Questions or concerns for the pharmacist? No   Are any medications first time fills? No            Medication Adherence          Adherence tools used: alarm      Support network for adherence: family member               Follow-Up: About 3 months for future fills    Lindsay Browning PharmD, MM   Clinical Speciality Pharmacist, Endocrinology   11/20/2023  14:09 EST

## 2023-11-20 NOTE — TELEPHONE ENCOUNTER
Incoming Refill Request      Medication requested (name and dose): ONE TOUCH VERIO TEST STRIP    Pharmacy where request should be sent: [facility] [id number]    Additional details provided by patient: ALTERNATIVE REQUESTED    Best call back number: [phone]    Does the patient have less than a 3 day supply:  [] Yes  [] No    Colby Cheek Rep  11/20/23, 15:39 EST

## 2023-11-21 NOTE — TELEPHONE ENCOUNTER
Called and spoke with patient regarding test strips, she has plenty but told her she will have to find out list of alternatives once she's running out.

## 2024-01-04 ENCOUNTER — LAB (OUTPATIENT)
Dept: LAB | Facility: HOSPITAL | Age: 64
End: 2024-01-04
Payer: COMMERCIAL

## 2024-01-04 ENCOUNTER — OFFICE VISIT (OUTPATIENT)
Dept: ONCOLOGY | Facility: CLINIC | Age: 64
End: 2024-01-04
Payer: COMMERCIAL

## 2024-01-04 VITALS
HEIGHT: 68 IN | SYSTOLIC BLOOD PRESSURE: 157 MMHG | RESPIRATION RATE: 16 BRPM | WEIGHT: 251.2 LBS | OXYGEN SATURATION: 95 % | DIASTOLIC BLOOD PRESSURE: 83 MMHG | BODY MASS INDEX: 38.07 KG/M2 | TEMPERATURE: 97.7 F | HEART RATE: 81 BPM

## 2024-01-04 DIAGNOSIS — D72.829 LEUKOCYTOSIS, UNSPECIFIED TYPE: ICD-10-CM

## 2024-01-04 DIAGNOSIS — R53.83 OTHER FATIGUE: ICD-10-CM

## 2024-01-04 DIAGNOSIS — D50.9 IRON DEFICIENCY ANEMIA, UNSPECIFIED IRON DEFICIENCY ANEMIA TYPE: ICD-10-CM

## 2024-01-04 DIAGNOSIS — E61.1 IRON DEFICIENCY: Primary | ICD-10-CM

## 2024-01-04 LAB
ALBUMIN SERPL-MCNC: 4.1 G/DL (ref 3.5–5.2)
ALBUMIN/GLOB SERPL: 1.5 G/DL
ALP SERPL-CCNC: 77 U/L (ref 39–117)
ALT SERPL W P-5'-P-CCNC: 10 U/L (ref 1–33)
ANION GAP SERPL CALCULATED.3IONS-SCNC: 9.4 MMOL/L (ref 5–15)
AST SERPL-CCNC: 13 U/L (ref 1–32)
BASOPHILS # BLD AUTO: 0.05 10*3/MM3 (ref 0–0.2)
BASOPHILS NFR BLD AUTO: 0.4 % (ref 0–1.5)
BILIRUB SERPL-MCNC: 0.3 MG/DL (ref 0–1.2)
BUN SERPL-MCNC: 12 MG/DL (ref 8–23)
BUN/CREAT SERPL: 13 (ref 7–25)
CALCIUM SPEC-SCNC: 9.2 MG/DL (ref 8.6–10.5)
CHLORIDE SERPL-SCNC: 99 MMOL/L (ref 98–107)
CO2 SERPL-SCNC: 30.6 MMOL/L (ref 22–29)
CREAT SERPL-MCNC: 0.92 MG/DL (ref 0.57–1)
DEPRECATED RDW RBC AUTO: 41.7 FL (ref 37–54)
EGFRCR SERPLBLD CKD-EPI 2021: 70.5 ML/MIN/1.73
EOSINOPHIL # BLD AUTO: 0.23 10*3/MM3 (ref 0–0.4)
EOSINOPHIL NFR BLD AUTO: 2 % (ref 0.3–6.2)
ERYTHROCYTE [DISTWIDTH] IN BLOOD BY AUTOMATED COUNT: 12.4 % (ref 12.3–15.4)
FERRITIN SERPL-MCNC: 534 NG/ML (ref 13–150)
GLOBULIN UR ELPH-MCNC: 2.8 GM/DL
GLUCOSE SERPL-MCNC: 230 MG/DL (ref 65–99)
HCT VFR BLD AUTO: 39.5 % (ref 34–46.6)
HGB BLD-MCNC: 13 G/DL (ref 12–15.9)
IMM GRANULOCYTES # BLD AUTO: 0.06 10*3/MM3 (ref 0–0.05)
IMM GRANULOCYTES NFR BLD AUTO: 0.5 % (ref 0–0.5)
IRON 24H UR-MRATE: 65 MCG/DL (ref 37–145)
IRON SATN MFR SERPL: 21 % (ref 20–50)
LYMPHOCYTES # BLD AUTO: 3.44 10*3/MM3 (ref 0.7–3.1)
LYMPHOCYTES NFR BLD AUTO: 29.4 % (ref 19.6–45.3)
MCH RBC QN AUTO: 30.2 PG (ref 26.6–33)
MCHC RBC AUTO-ENTMCNC: 32.9 G/DL (ref 31.5–35.7)
MCV RBC AUTO: 91.9 FL (ref 79–97)
MONOCYTES # BLD AUTO: 0.85 10*3/MM3 (ref 0.1–0.9)
MONOCYTES NFR BLD AUTO: 7.3 % (ref 5–12)
NEUTROPHILS NFR BLD AUTO: 60.4 % (ref 42.7–76)
NEUTROPHILS NFR BLD AUTO: 7.07 10*3/MM3 (ref 1.7–7)
NRBC BLD AUTO-RTO: 0 /100 WBC (ref 0–0.2)
PLATELET # BLD AUTO: 261 10*3/MM3 (ref 140–450)
PMV BLD AUTO: 9.5 FL (ref 6–12)
POTASSIUM SERPL-SCNC: 4 MMOL/L (ref 3.5–5.2)
PROT SERPL-MCNC: 6.9 G/DL (ref 6–8.5)
RBC # BLD AUTO: 4.3 10*6/MM3 (ref 3.77–5.28)
SODIUM SERPL-SCNC: 139 MMOL/L (ref 136–145)
TIBC SERPL-MCNC: 308 MCG/DL (ref 298–536)
TRANSFERRIN SERPL-MCNC: 220 MG/DL (ref 200–360)
WBC NRBC COR # BLD AUTO: 11.7 10*3/MM3 (ref 3.4–10.8)

## 2024-01-04 PROCEDURE — 80053 COMPREHEN METABOLIC PANEL: CPT

## 2024-01-04 PROCEDURE — 84466 ASSAY OF TRANSFERRIN: CPT

## 2024-01-04 PROCEDURE — 36415 COLL VENOUS BLD VENIPUNCTURE: CPT

## 2024-01-04 PROCEDURE — 82728 ASSAY OF FERRITIN: CPT

## 2024-01-04 PROCEDURE — 99213 OFFICE O/P EST LOW 20 MIN: CPT | Performed by: INTERNAL MEDICINE

## 2024-01-04 PROCEDURE — 85025 COMPLETE CBC W/AUTO DIFF WBC: CPT

## 2024-01-04 PROCEDURE — 83540 ASSAY OF IRON: CPT

## 2024-01-04 RX ORDER — LUBIPROSTONE 8 UG/1
8 CAPSULE ORAL 2 TIMES DAILY WITH MEALS
COMMUNITY
Start: 2023-12-14

## 2024-01-04 RX ORDER — TRIAMCINOLONE ACETONIDE 5 MG/G
1 CREAM TOPICAL EVERY 12 HOURS SCHEDULED
COMMUNITY

## 2024-01-04 RX ORDER — CYCLOBENZAPRINE HCL 10 MG
10 TABLET ORAL AS NEEDED
COMMUNITY
Start: 2023-12-14 | End: 2024-03-13

## 2024-01-04 NOTE — PROGRESS NOTES
Subjective     REASON FOR FOLLOW-UP:    History of iron deficiency anemia recurrent s/p IV Feraheme in the past  Leukocytosis chronic, JAK2 mutation negative, JAK2 mutation exon 12 - negative -, BCR able negative    HISTORY OF PRESENT ILLNESS:    Patient is a 63 y.o. female who was referred here in March 2022 for fatigue and was found to be iron deficient. Even though her hemoglobin was normal at presentation, percent iron sat and ferritin were low. Patient also with leukocytosis chronically. We checked JAK2 mutation which was negative and BCR able is negative. Patient given IV Venofer for total of 1000 mg in April 2022. Repeat iron studies in June 2022 showed replete iron stores.    Patient seen in follow-up December 2022 with recurrent iron deficiency with iron saturation of 12%.  No anemia with hemoglobin of 13.6 but symptomatic with worsening fatigue.  Patient given additional IV Venofer 300 mg x 3.  Patient has been seen by Dr. Vázquez who recommended upper and lower endoscopies.  She reports that she is now scheduled to have these in August 2023.      As she is reviewed back today patient is feeling fatigued again.  Her hemoglobin remains normal at 13.4 but iron saturation is low again at 14%.  This does qualify her for additional iron and we will get this arranged.    Interval follow-up: January 4, 2024:    This year patient has required IV Venofer x 2 once in January and then subsequently in July 2023.  She has chronic fatigue.  Her iron studies are pending.  She is scheduled for colonoscopy and EGD in April 2024.  She denies any active GI bleeding.  Her hemoglobin has always been normal around 13.  Her leukocytosis is stable anywhere from [id number].  She has been worked up for that in the past and is negative.  Her iron studies are completely normal with iron saturation of 21, TIBC of 308 and ferritin of 543.  Her thyroid function tests have been done in the past and they have been normal.  She is on  multiple medications like Cymbalta and trazodone which all can add to the fatigue.  She is going to discuss with her primary care physician to see if she could be referred to a psychiatry physician to see if they can manage the medications better.      Hematology history:  Patient is a 61-year-old female who has seen me in the past about 10 years ago with iron deficiency anemia and apparently received Feraheme infusions at that time.  Currently she is feeling fatigued and also has chronic leukocytosis and is here for evaluation.  She does have sinus infections and bronchitis for which she takes intermittent antibiotics.  She had to have drainage of her sinuses once.  She does complain of abdominal discomfort with fullness of the left upper quadrant.  Certainly we will need to evaluate her iron and B12 studies given her fatigue and likely  check her thyroid functions.    Patient has not lost weight.  She has got a reasonably good appetite.  She has chronic back pain and has had surgery in the low back many years ago.    Patient's past medical history is consistent with a TIA x2, hypertension,    Patient does smoke but not significant.  We have encouraged her to quit smoking    Past Medical History:   Diagnosis Date    Anemia     Anxiety     Awareness under anesthesia     X2    Back pain     Bulging lumbar disc     Clostridium difficile colitis 2005    DDD (degenerative disc disease), lumbar     Depression     Dizziness     Elevated blood sugar level     PT STATES THIS WAS AFTER RECEIVING CORTISONE INJECTIONS IN KNEES    Frequent UTI     CURRENTLY    GERD (gastroesophageal reflux disease)     High cholesterol     History of anemia     History of blood transfusion 2016    History of blood transfusion     History of Clostridium difficile 2012    History of snoring     History of TIAs     LAST ONE 2 YRS AGO    Hypertension     IBS (irritable bowel syndrome)     Infectious mononucleosis     Left ankle pain     Leg pain,  right     On home oxygen therapy     2-3L NC AT NIGHT    HECTOR (obstructive sleep apnea)     Osteoporosis     Pancreas disorder     PT TAKES VICTOZA INJ     Polycystic ovary syndrome     PVC's (premature ventricular contractions)     Restless leg syndrome     Type 2 diabetes mellitus     Vitamin D deficiency         Past Surgical History:   Procedure Laterality Date    ANKLE SURGERY Right     BACK SURGERY      FEMUR FRACTURE SURGERY      HYSTERECTOMY      KNEE ARTHROPLASTY Right     NASAL SINUS SURGERY      X3    CA REVJ TOTAL KNEE ARTHRP W/WO ALGRFT 1 COMPONENT Right 08/15/2016    Procedure: RT TOTAL KNEE ARTHROPLASTY REVISION;  Surgeon: Alcides Ramirez MD;  Location: [facility];  Service: Orthopedics        Current Outpatient Medications on File Prior to Visit   Medication Sig Dispense Refill    albuterol sulfate  (90 Base) MCG/ACT inhaler Inhale 2 puffs.      amLODIPine (NORVASC) 10 MG tablet TAKE 1 TABLET BY MOUTH EVERY DAY AT NIGHT      Armodafinil (NUVIGIL PO) Take 250 mg by mouth every morning.      Blood Glucose Monitoring Suppl (OneTouch Verio Reflect) w/Device kit USE FOUR TIMES A DAY AS DIRECTED 1 kit 1    celecoxib (CeleBREX) 200 MG capsule 2QD      clopidogrel (PLAVIX) 75 MG tablet Take 1 tablet by mouth Daily.      Continuous Blood Gluc Sensor (Dexcom G7 Sensor) misc Use 1 each Every 10 (Ten) Days. 9 each 1    cyclobenzaprine (FLEXERIL) 10 MG tablet Take 1 tablet by mouth As Needed.      Diclofenac Sodium (VOLTAREN) 1 % gel gel Prn and uses sparingly      Dulaglutide (Trulicity) 4.5 MG/0.5ML solution pen-injector Inject 0.5 mL under the skin into the appropriate area as directed Every 7 (Seven) Days. 6 mL 1    DULoxetine (CYMBALTA) 60 MG capsule TAKE 1 CAPSULE BY MOUTH TWICE DAILY      Empagliflozin-metFORMIN HCl ER (Synjardy XR) 12.5-1000 MG tablet sustained-release 24 hour Take 1 tablet by mouth Daily With Breakfast. 90 tablet 1    Empagliflozin-metFORMIN HCl ER (Synjardy XR) 12.5-1000 MG  tablet sustained-release 24 hour Take 1 tablet by mouth Daily. 90 tablet 1    fexofenadine (ALLEGRA) 180 MG tablet Take 1 tablet by mouth Daily.      gabapentin (NEURONTIN) 600 MG tablet Take 1 tablet by mouth 3 (Three) Times a Day.      glucose blood (OneTouch Verio) test strip USE 4 TIMES DAILY AS DIRECTED 200 each 7    Hyoscyamine Sulfate (LEVBID PO) Take 1.25 mg by mouth daily as needed.      Insulin Glargine, 2 Unit Dial, (Toujeo Max SoloStar) 300 UNIT/ML solution pen-injector injection Inject 80 Units under the skin into the appropriate area as directed Daily. 24 mL 1    Insulin Pen Needle 32G X 4 MM misc Use as directed twice daily 100 each 5    Lancets misc Dispense based on insurance preference: Check 4 times a day. Dx: E 11.9 300 each 4    lisinopril (PRINIVIL,ZESTRIL) 40 MG tablet Take 1 tablet by mouth Daily.      lubiprostone (AMITIZA) 8 MCG capsule Take 1 capsule by mouth 2 (Two) Times a Day With Meals.      metoprolol succinate XL (TOPROL-XL) 100 MG 24 hr tablet TAKE 2 TABLETS BY MOUTH EVERY DAY AT NIGHT      montelukast (SINGULAIR) 10 MG tablet Take 1 tablet by mouth Every Night.      nitrofurantoin, macrocrystal-monohydrate, (MACROBID) 100 MG capsule As Needed.      omeprazole (prilOSEC) 10 MG capsule Take 1 capsule by mouth Daily.      ondansetron ODT (ZOFRAN-ODT) 4 MG disintegrating tablet Take 1 tablet by mouth 4 (Four) Times a Day As Needed for Nausea or Vomiting. 15 tablet 0    oxyCODONE-acetaminophen (PERCOCET)  MG per tablet 1 TABLET AS NEEDED ORAL EVERY 6 HRS 30 DAYS      rOPINIRole (REQUIP) 1 MG tablet TAKE 2 TABLETS BY MOUTH AT BEDTIME Oral for 90      rosuvastatin (CRESTOR) 40 MG tablet TAKE 1 TABLET BY MOUTH EVERY DAY 90 tablet 3    Topiramate 25 MG/ML solution Take 1 tablet by mouth 2 (Two) Times a Day.      traZODone (DESYREL) 100 MG tablet Take 3 tablets by mouth Every Night.      triamcinolone (KENALOG) 0.5 % cream Apply 1 application  topically to the appropriate area as  directed Every 12 (Twelve) Hours.      furosemide (LASIX) 20 MG tablet Take 1 tablet by mouth Daily As Needed.      [DISCONTINUED] methocarbamol (ROBAXIN) 500 MG tablet Take 1-2 tablets by mouth Every 8 (Eight) Hours As Needed.       No current facility-administered medications on file prior to visit.        ALLERGIES:    Allergies   Allergen Reactions    Cortisone Other (See Comments) and Unknown - High Severity     TIA-POST INJECTION- KNEES    Aspirin Buf(Cacarb-Mgcarb-Mgo) Other (See Comments)     Nose bleeds    Augmentin [Amoxicillin-Pot Clavulanate] GI Intolerance        Social History     Socioeconomic History    Marital status:      Spouse name: Jose    Number of children: 1   Tobacco Use    Smoking status: Never    Smokeless tobacco: Never    Tobacco comments:     I have never smoked but my chart says I do?   Substance and Sexual Activity    Alcohol use: Not Currently     Alcohol/week: 1.0 standard drink of alcohol     Types: 1 Cans of beer per week     Comment: RARELY    Drug use: No    Sexual activity: Not Currently     Partners: Male     Birth control/protection: Post-menopausal        Family History   Problem Relation Age of Onset    Hypertension Father         heart attack age 40    Heart disease Father     Heart attack Father     Asthma Brother     Colon cancer Paternal Grandfather     Diabetes Paternal Grandfather     Cancer Maternal Aunt         Great Aunt        Review of Systems   Constitutional:  Positive for fatigue. Negative for appetite change, chills, diaphoresis, fever and unexpected weight change.   HENT:  Negative for hearing loss, sore throat and trouble swallowing.    Respiratory:  Negative for cough, chest tightness, shortness of breath and wheezing.    Cardiovascular:  Negative for chest pain, palpitations and leg swelling.   Gastrointestinal:  Negative for abdominal distention, abdominal pain, constipation, diarrhea, nausea and vomiting.   Genitourinary:  Negative for dysuria,  "frequency, hematuria and urgency.   Musculoskeletal:  Negative for joint swelling.        No muscle weakness.   Skin:  Negative for rash and wound.   Neurological:  Negative for seizures, syncope, speech difficulty, weakness, numbness and headaches.   Hematological:  Negative for adenopathy. Does not bruise/bleed easily.   Psychiatric/Behavioral:  Negative for behavioral problems, confusion and suicidal ideas.    All other systems reviewed and are negative.       Objective     Vitals:    01/04/24 1039   BP: 157/83  Comment: has taken BP medication yet   Pulse: 81   Resp: 16   Temp: 97.7 °F (36.5 °C)   TempSrc: Temporal   SpO2: 95%   Weight: 114 kg (251 lb 3.2 oz)   Height: 172.7 cm (67.99\")   PainSc:   5   PainLoc: Leg  Comment: Broken Femur right leg           1/4/2024    10:28 AM   Current Status   ECOG score 0       Physical Exam      CONSTITUTIONAL:  Vital signs reviewed.  No distress, looks comfortable.  EYES:  Conjunctivae and lids unremarkable.  PERRLA  EARS,NOSE,MOUTH,THROAT:  Ears and nose appear unremarkable.  Lips, teeth, gums appear unremarkable.  RESPIRATORY:  Normal respiratory effort.  Lungs clear to auscultation bilaterally.  CARDIOVASCULAR:  Normal S1, S2.  No murmurs rubs or gallops.  No significant lower extremity edema.  GASTROINTESTINAL: Abdomen appears unremarkable.  Nontender.  No hepatomegaly.  No splenomegaly.  LYMPHATIC:  No cervical, supraclavicular, axillary lymphadenopathy.  SKIN:  Warm.  No rashes.  PSYCHIATRIC:  Normal judgment and insight.  Normal mood and affect.    I have reexamined the patient and the results are consistent with the previously documented exam. Debby Avilez MD       RECENT LABS:  Results from last 7 days   Lab Units 01/04/24  1027   WBC 10*3/mm3 11.70*   NEUTROS ABS 10*3/mm3 7.07*   HEMOGLOBIN g/dL 13.0   HEMATOCRIT % 39.5   PLATELETS 10*3/mm3 261       Results from last 7 days   Lab Units 01/04/24  1027   SODIUM mmol/L 139   POTASSIUM mmol/L 4.0   CHLORIDE " mmol/L 99   CO2 mmol/L 30.6*   BUN mg/dL 12   CREATININE mg/dL 0.92   CALCIUM mg/dL 9.2   ALBUMIN g/dL 4.1   BILIRUBIN mg/dL 0.3   ALK PHOS U/L 77   ALT (SGPT) U/L 10   AST (SGOT) U/L 13   GLUCOSE mg/dL 230*   FERRITIN ng/mL 534.00*   IRON mcg/dL 65   TIBC mcg/dL 308                 Assessment & Plan     *Recurrent Iron deficiency anemia   Unable to tolerate oral iron.  Treated with Feraheme in the past  Referred here March 2022. Symptomatic with significant fatigue even though her hemoglobin normal. B12 306, ESR 50, RBC folate normal thyroid function test normal ferritin 104 and percent saturation of 13 with TIBC elevation indicating iron deficiency  Patient denies active GI bleeding  She did have a colonoscopy in the past reportedly negative  S/p IV Venofer x5 in April 25, 2022  Patient referred to GI, seen by Dr. Vázquez in September 2022. EGD/colonoscopy recommended. Patient in process of scheduling for 1/2023.  12/14/2022 iron sat is dropped back down to 12%, TIBC elevated.  Ferritin is 224 but likely reactive.  She does qualify for further IV iron and we will proceed considering she feels poorly.  6/27/2023 though patient is not anemic with hemoglobin normal at 13.4, she is iron deficient again with iron saturation of 14% and is more fatigued again.  We will submit for additional IV iron in the form of Venofer.  She is now scheduled for upper and lower endoscopies with Dr. Vázquez in August 2023.  January 4, 2024: Currently iron studies are normal.  Her hemoglobin is 13 which is stable.  She is scheduled for EGD colonoscopy in April 2024.  She is not actively bleeding.  Her thyroid function tests have been normal.  Her chronic fatigue may be related to her multiple medications and she is going to discuss with the primary care physician to see if she could be referred to a psychiatrist to help deal with the psychiatric medications.    *Leukocytosis, chronic  Reviewing the records she has had intermittent  leukocytosis anywhere from 11-13  She also complained of abdominal tenderness and discomfort  She will require complete evaluation with CT scan of the abdomen pelvis.  We will likely obtain C-reactive protein, ESR and plain x-rays of the CT of the sinuses and CT scan of the abdomen and pelvis  JAK2 mutation negative JAK2 exon 12 - negative, BCR see able negative negative  2/27/2022: Hemoglobin 13.4 but white count is 14.01.  Chronic leukocytosis stable  WBC remains mildly elevated but better overall at 10.9. Monitor.  WBC count 11.9 stable.    *Uncontrolled diabetes  Hemoglobin A1c in November 2022 was over 10.  Patient has continued on oral agents through endocrinology.  Having difficulty with her insulin pump in the 2022.    Repeat A1c March 2023 improved 8.8.    Patient is requesting repeat of A1c today now that her pump is working well and she is working on weight loss, including the addition of Trulicity medication.      Plan  S/p iron infusion x 2 in 2023 once in January and subsequently again in July 2023.  Patient is ultimately scheduled for EGD colonoscopy April 2024  Her chronic fatigue may be related to her underlying psychiatric medications and she is going to request a primary care physician to see if she can be referred to a psychiatrist to manage her medications  Her chronic leukocytosis is mild and stable and at the present time does not require any bone marrow aspiration or biopsy  Follow-up with nurse practitioner in 6 months at which time we will be able to release her from our office if her EGD colonoscopy have been negative.    MD Debby Loyola MD

## 2024-01-23 ENCOUNTER — HOSPITAL ENCOUNTER (OUTPATIENT)
Dept: GENERAL RADIOLOGY | Facility: HOSPITAL | Age: 64
Discharge: HOME | End: 2024-01-23
Admitting: ANESTHESIOLOGY
Payer: COMMERCIAL

## 2024-01-23 DIAGNOSIS — M19.011 PRIMARY OSTEOARTHRITIS OF BOTH SHOULDERS: ICD-10-CM

## 2024-01-23 DIAGNOSIS — M19.012 PRIMARY OSTEOARTHRITIS OF BOTH SHOULDERS: ICD-10-CM

## 2024-01-23 PROCEDURE — 73030 X-RAY EXAM OF SHOULDER: CPT

## 2024-02-23 RX ORDER — INSULIN GLARGINE 300 U/ML
80 INJECTION, SOLUTION SUBCUTANEOUS DAILY
Qty: 24 ML | Refills: 1 | Status: SHIPPED | OUTPATIENT
Start: 2024-02-23

## 2024-02-23 RX ORDER — DULAGLUTIDE 4.5 MG/.5ML
0.5 INJECTION, SOLUTION SUBCUTANEOUS
Qty: 6 ML | Refills: 1 | Status: SHIPPED | OUTPATIENT
Start: 2024-02-23

## 2024-02-23 NOTE — TELEPHONE ENCOUNTER
Rx Refill Note  Requested Prescriptions     Pending Prescriptions Disp Refills    Dulaglutide (Trulicity) 4.5 MG/0.5ML solution pen-injector 6 mL 1     Sig: Inject 0.5 mL under the skin into the appropriate area as directed Every 7 (Seven) Days.    Insulin Glargine, 2 Unit Dial, (Toujeo Max SoloStar) 300 UNIT/ML solution pen-injector injection 24 mL 1     Sig: Inject 80 Units under the skin into the appropriate area as directed Daily.      Last office visit with prescribing clinician: 10/18/2023   Last telemedicine visit with prescribing clinician: Visit date not found   Next office visit with prescribing clinician: 4/8/2024                         Would you like a call back once the refill request has been completed: [] Yes [] No    If the office needs to give you a call back, can they leave a voicemail: [] Yes [] No    Chasidy Kirkpatrick MA  02/23/24, 15:33 EST

## 2024-03-07 PROBLEM — D50.0 IRON DEFICIENCY ANEMIA DUE TO CHRONIC BLOOD LOSS: Status: ACTIVE | Noted: 2022-09-01

## 2024-03-18 ENCOUNTER — SPECIALTY PHARMACY (OUTPATIENT)
Dept: ENDOCRINOLOGY | Age: 64
End: 2024-03-18
Payer: COMMERCIAL

## 2024-03-18 NOTE — PROGRESS NOTES
" Specialty Pharmacy Patient Management Program  Endocrinology Refill Outreach      Ashley \"ABDULAZIZ\" is a 64 y.o. female contacted today regarding refills of her medication(s).    Specialty medication(s) and dose(s) confirmed: trulicity  Other medication(s) being refilled: none    Refill Questions      Flowsheet Row Most Recent Value   Changes to allergies? No   Changes to medications? No   New conditions or infections since last clinic visit No   Unplanned office visit, urgent care, ED, or hospital admission in the last 4 weeks  No   How does patient/caregiver feel medication is working? Very good   Financial problems or insurance changes  No   Since the previous refill, were any specialty medication doses or scheduled injections missed or delayed?  No   Does this patient require a clinical escalation to a pharmacist? No          Delivery Questions      Flowsheet Row Most Recent Value   Delivery method Beeline   Delivery address verified with patient/caregiver? Yes   Delivery address Home   Number of medications in delivery 1   Medication(s) being filled and delivered Dulaglutide   Copay verified? Yes   Copay amount 30   Copay form of payment Credit/debit on file            Follow-Up: 28 days    Tashia Lucia, PharmD  Clinical Specialty Pharmacist, Endocrinology  3/18/2024  10:33 EDT       "

## 2024-04-22 ENCOUNTER — TELEPHONE (OUTPATIENT)
Dept: ENDOCRINOLOGY | Age: 64
End: 2024-04-22
Payer: COMMERCIAL

## 2024-05-02 ENCOUNTER — SPECIALTY PHARMACY (OUTPATIENT)
Dept: ENDOCRINOLOGY | Age: 64
End: 2024-05-02
Payer: COMMERCIAL

## 2024-05-02 NOTE — PROGRESS NOTES
Specialty Pharmacy Patient Management Program  Program Disenrollment      Ashley Malone is a 64 y.o. female seen by an Endocrinology provider for Type 2 Diabetes. Patient was previously enrolled in the Endocrinology Patient Management program offered by [facility].      Sending patient a disenrollment letter today as we have been unable to reach patient after at least 10 attempts. If patient does not reach out within 7 days of the letter being sent, she will temporarily be disenrolled from the specialty pharmacy program.    Tashia Lucia, PharmD   5/2/2024  09:12 EDT

## 2024-05-13 ENCOUNTER — SPECIALTY PHARMACY (OUTPATIENT)
Dept: ENDOCRINOLOGY | Age: 64
End: 2024-05-13
Payer: COMMERCIAL

## 2024-05-13 NOTE — PROGRESS NOTES
Specialty Pharmacy Patient Management Program  Program Disenrollment      Ashley Malone is a 64 y.o. female seen by an Endocrinology provider for Type 2 Diabetes. Patient was previously enrolled in the Endocrinology Patient Management program offered by [facility].      Disenrolling patient today as we have been unable to reach her after multiple attempts. It have been a pleasure to take part in patients care and we would be happy to re-enroll her in the future should our pharmacy services be needed.    Tashia Lucia, PharmD   5/13/2024  09:48 EDT

## 2024-07-05 ENCOUNTER — PRE-ADMISSION TESTING (OUTPATIENT)
Dept: PREADMISSION TESTING | Facility: HOSPITAL | Age: 64
End: 2024-07-05
Payer: COMMERCIAL

## 2024-07-05 VITALS
WEIGHT: 260.8 LBS | TEMPERATURE: 99.1 F | RESPIRATION RATE: 18 BRPM | HEART RATE: 90 BPM | OXYGEN SATURATION: 95 % | SYSTOLIC BLOOD PRESSURE: 156 MMHG | BODY MASS INDEX: 40.93 KG/M2 | HEIGHT: 67 IN | DIASTOLIC BLOOD PRESSURE: 77 MMHG

## 2024-07-05 LAB
ANION GAP SERPL CALCULATED.3IONS-SCNC: 20 MMOL/L (ref 5–15)
BUN SERPL-MCNC: 15 MG/DL (ref 8–23)
BUN/CREAT SERPL: 17.4 (ref 7–25)
CALCIUM SPEC-SCNC: 9.4 MG/DL (ref 8.6–10.5)
CHLORIDE SERPL-SCNC: 93 MMOL/L (ref 98–107)
CO2 SERPL-SCNC: 23 MMOL/L (ref 22–29)
CREAT SERPL-MCNC: 0.86 MG/DL (ref 0.57–1)
DEPRECATED RDW RBC AUTO: 39.6 FL (ref 37–54)
EGFRCR SERPLBLD CKD-EPI 2021: 76 ML/MIN/1.73
ERYTHROCYTE [DISTWIDTH] IN BLOOD BY AUTOMATED COUNT: 12.3 % (ref 12.3–15.4)
GLUCOSE SERPL-MCNC: 358 MG/DL (ref 65–99)
HBA1C MFR BLD: 10.1 % (ref 4.8–5.6)
HCT VFR BLD AUTO: 40.9 % (ref 34–46.6)
HGB BLD-MCNC: 13.4 G/DL (ref 12–15.9)
MCH RBC QN AUTO: 29.5 PG (ref 26.6–33)
MCHC RBC AUTO-ENTMCNC: 32.8 G/DL (ref 31.5–35.7)
MCV RBC AUTO: 90.1 FL (ref 79–97)
PLATELET # BLD AUTO: 265 10*3/MM3 (ref 140–450)
PMV BLD AUTO: 10.5 FL (ref 6–12)
POTASSIUM SERPL-SCNC: 4.3 MMOL/L (ref 3.5–5.2)
QT INTERVAL: 391 MS
QTC INTERVAL: 440 MS
RBC # BLD AUTO: 4.54 10*6/MM3 (ref 3.77–5.28)
SODIUM SERPL-SCNC: 136 MMOL/L (ref 136–145)
WBC NRBC COR # BLD AUTO: 9.57 10*3/MM3 (ref 3.4–10.8)

## 2024-07-05 PROCEDURE — 83036 HEMOGLOBIN GLYCOSYLATED A1C: CPT

## 2024-07-05 PROCEDURE — 80048 BASIC METABOLIC PNL TOTAL CA: CPT

## 2024-07-05 PROCEDURE — 85027 COMPLETE CBC AUTOMATED: CPT

## 2024-07-05 PROCEDURE — 36415 COLL VENOUS BLD VENIPUNCTURE: CPT

## 2024-07-05 PROCEDURE — 93005 ELECTROCARDIOGRAM TRACING: CPT

## 2024-07-05 RX ORDER — ERGOCALCIFEROL 1.25 MG/1
50000 CAPSULE ORAL WEEKLY
COMMUNITY

## 2024-07-05 NOTE — DISCHARGE INSTRUCTIONS
Take the following medications the morning of surgery:  OXYCODONE, USE INHALER, DULOXETINE, METOPROLOL, OMEPRAZOLE, AMLODIPINE      If you are on prescription narcotic pain medication to control your pain you may also take that medication the morning of surgery.    General Instructions:  Do not eat solid food after midnight the night before surgery.  You may drink clear liquids day of surgery but must stop at least one hour before your hospital arrival time.  It is beneficial for you to have a clear drink that contains carbohydrates the day of surgery.  We suggest a 12 to 20 ounce bottle of Gatorade or Powerade for non-diabetic patients or a 12 to 20 ounce bottle of G2 or Powerade Zero for diabetic patients. (Pediatric patients, are not advised to drink a 12 to 20 ounce carbohydrate drink)    Clear liquids are liquids you can see through.  Nothing red in color.     Plain water                               Sports drinks  Sodas                                   Gelatin (Jell-O)  Fruit juices without pulp such as white grape juice and apple juice  Popsicles that contain no fruit or yogurt  Tea or coffee (no cream or milk added)  Gatorade / Powerade  G2 / Powerade Zero    Infants may have breast milk up to four hours before surgery.  Infants drinking formula may drink formula up to six hours before surgery.   Patients who avoid smoking, chewing tobacco and alcohol for 4 weeks prior to surgery have a reduced risk of post-operative complications.  Quit smoking as many days before surgery as you can.  Do not smoke, use chewing tobacco or drink alcohol the day of surgery.   If applicable bring your C-PAP/ BI-PAP machine in with you to preop day of surgery.  Bring any papers given to you in the doctor’s office.  Wear clean comfortable clothes.  Do not wear contact lenses, false eyelashes or make-up.  Bring a case for your glasses.   Bring crutches or walker if applicable.  Remove all piercings.  Leave jewelry and any other  valuables at home.  Hair extensions with metal clips must be removed prior to surgery.  The Pre-Admission Testing nurse will instruct you to bring medications if unable to obtain an accurate list in Pre-Admission Testing.        If you were given a blood bank ID arm band remember to bring it with you the day of surgery.    Preventing a Surgical Site Infection:  For 2 to 3 days before surgery, avoid shaving with a razor because the razor can irritate skin and make it easier to develop an infection.    Any areas of open skin can increase the risk of a post-operative wound infection by allowing bacteria to enter and travel throughout the body.  Notify your surgeon if you have any skin wounds / rashes even if it is not near the expected surgical site.  The area will need assessed to determine if surgery should be delayed until it is healed.  The night prior to surgery shower using a fresh bar of anti-bacterial soap (such as Dial) and clean washcloth.  Sleep in a clean bed with clean clothing.  Do not allow pets to sleep with you.  Shower on the morning of surgery using a fresh bar of anti-bacterial soap (such as Dial) and clean washcloth.  Dry with a clean towel and dress in clean clothing.  Ask your surgeon if you will be receiving antibiotics prior to surgery.  Make sure you, your family, and all healthcare providers clean their hands with soap and water or an alcohol based hand  before caring for you or your wound.      CHLORHEXIDINE CLOTH INSTRUCTIONS  The morning of surgery follow these instructions using the Chlorhexidine cloths you've been given.  These steps reduce bacteria on the body.  Do not use the cloths near your eyes, ears mouth, genitalia or on open wounds.  Throw the cloths away after use but do not try to flush them down a toilet.      Open and remove one cloth at a time from the package.    Leave the cloth unfolded and begin the bathing.  Massage the skin with the cloths using gentle pressure  to remove bacteria.  Do not scrub harshly.   Follow the steps below with one 2% CHG cloth per area (6 total cloths).  One cloth for neck, shoulders and chest.  One cloth for both arms, hands, fingers and underarms (do underarms last).  One cloth for the abdomen followed by groin.  One cloth for right leg and foot including between the toes.  One cloth for left leg and foot including between the toes.  The last cloth is to be used for the back of the neck, back and buttocks.    Allow the CHG to air dry 3 minutes on the skin which will give it time to work and decrease the chance of irritation.  The skin may feel sticky until it is dry.  Do not rinse with water or any other liquid or you will lose the beneficial effects of the CHG.  If mild skin irritation occurs, do rinse the skin to remove the CHG.  Report this to the nurse at time of admission.  Do not apply lotions, creams, ointments, deodorants or perfumes after using the clothes. Dress in clean clothes before coming to the hospital.      Day of surgery:  Your arrival time is approximately two hours before your scheduled surgery time.  Upon arrival, a Pre-op nurse and Anesthesiologist will review your health history, obtain vital signs, and answer questions you may have.  The only belongings needed at this time will be a list of your home medications and if applicable your C-PAP/BI-PAP machine.  A Pre-op nurse will start an IV and you may receive medication in preparation for surgery, including something to help you relax.     Please be aware that surgery does come with discomfort.  We want to make every effort to control your discomfort so please discuss any uncontrolled symptoms with your nurse.   Your doctor will most likely have prescribed pain medications.      If you are going home after surgery you will receive individualized written care instructions before being discharged.  A responsible adult must drive you to and from the hospital on the day of your  surgery and ideally stay with you through the night.   .  Discharge prescriptions can be filled by the hospital pharmacy during regular pharmacy hours.  If you are having surgery late in the day/evening your prescription may be e-prescribed to your pharmacy.  Please verify your pharmacy hours or chose a 24 hour pharmacy to avoid not having access to your prescription because your pharmacy has closed for the day.    If you are staying overnight following surgery, you will be transported to your hospital room following the recovery period.  [facility] has all private rooms.    If you have any questions please call Pre-Admission Testing at [phone].  Deductibles and co-payments are collected on the day of service. Please be prepared to pay the required co-pay, deductible or deposit on the day of service as defined by your plan.    Call your surgeon immediately if you experience any of the following symptoms:  Sore Throat  Shortness of Breath or difficulty breathing  Cough  Chills  Body soreness or muscle pain  Headache  Fever  New loss of taste or smell  Do not arrive for your surgery ill.  Your procedure will need to be rescheduled to another time.  You will need to call your physician before the day of surgery to avoid any unnecessary exposure to hospital staff as well as other patients.

## 2024-08-07 RX ORDER — EMPAGLIFLOZIN, METFORMIN HYDROCHLORIDE 12.5; 1 MG/1; MG/1
1 TABLET, EXTENDED RELEASE ORAL
Qty: 90 TABLET | Refills: 1 | OUTPATIENT
Start: 2024-08-07

## 2024-08-07 NOTE — TELEPHONE ENCOUNTER
Rx Refill Note  Requested Prescriptions     Pending Prescriptions Disp Refills    Insulin Pen Needle 32G X 4 MM misc 100 each 5     Sig: Use as directed twice daily    Empagliflozin-metFORMIN HCl ER (Synjardy XR) 12.5-1000 MG tablet sustained-release 24 hour 90 tablet 1     Sig: Take 1 tablet by mouth Daily With Breakfast.      Last office visit with prescribing clinician: 10/18/2023   Last telemedicine visit with prescribing clinician: Visit date not found   Next office visit with prescribing clinician: Visit date not found                         Would you like a call back once the refill request has been completed: [] Yes [] No    If the office needs to give you a call back, can they leave a voicemail: [] Yes [] No    Chasidy Kirkpatrick MA  08/07/24, 07:58 EDT

## 2024-08-21 RX ORDER — EMPAGLIFLOZIN, METFORMIN HYDROCHLORIDE 12.5; 1 MG/1; MG/1
1 TABLET, EXTENDED RELEASE ORAL
Qty: 90 TABLET | Refills: 1 | OUTPATIENT
Start: 2024-08-21

## 2024-08-21 NOTE — TELEPHONE ENCOUNTER
Rx Refill Note  Requested Prescriptions     Pending Prescriptions Disp Refills    Insulin Pen Needle 32G X 4 MM misc 100 each 5     Sig: Use as directed twice daily    Empagliflozin-metFORMIN HCl ER (Synjardy XR) 12.5-1000 MG tablet sustained-release 24 hour 90 tablet 1     Sig: Take 1 tablet by mouth Daily With Breakfast.      Last office visit with prescribing clinician: 10/18/2023   Last telemedicine visit with prescribing clinician: Visit date not found   Next office visit with prescribing clinician: Visit date not found                         Would you like a call back once the refill request has been completed: [] Yes [] No    If the office needs to give you a call back, can they leave a voicemail: [] Yes [] No    Chasidy Kirkpatrick MA  08/21/24, 07:49 EDT

## 2024-10-17 RX ORDER — EMPAGLIFLOZIN, METFORMIN HYDROCHLORIDE 12.5; 1 MG/1; MG/1
1 TABLET, EXTENDED RELEASE ORAL
Qty: 90 TABLET | Refills: 1 | OUTPATIENT
Start: 2024-10-17

## 2024-10-17 NOTE — TELEPHONE ENCOUNTER
Rx Refill Note  Requested Prescriptions     Pending Prescriptions Disp Refills    Insulin Pen Needle 32G X 4 MM misc 100 each 5     Sig: Use as directed twice daily    Empagliflozin-metFORMIN HCl ER (Synjardy XR) 12.5-1000 MG tablet sustained-release 24 hour 90 tablet 1     Sig: Take 1 tablet by mouth Daily With Breakfast.      Last office visit with prescribing clinician: 10/18/2023   Last telemedicine visit with prescribing clinician: Visit date not found   Next office visit with prescribing clinician: Visit date not found                         Would you like a call back once the refill request has been completed: [] Yes [] No    If the office needs to give you a call back, can they leave a voicemail: [] Yes [] No    Jesenia Smith MA  10/17/24, 10:20 EDT

## 2024-11-11 ENCOUNTER — SPECIALTY PHARMACY (OUTPATIENT)
Dept: ENDOCRINOLOGY | Age: 64
End: 2024-11-11
Payer: COMMERCIAL

## 2024-11-11 ENCOUNTER — OFFICE VISIT (OUTPATIENT)
Dept: ENDOCRINOLOGY | Age: 64
End: 2024-11-11
Payer: COMMERCIAL

## 2024-11-11 VITALS
OXYGEN SATURATION: 94 % | SYSTOLIC BLOOD PRESSURE: 142 MMHG | DIASTOLIC BLOOD PRESSURE: 80 MMHG | HEIGHT: 67 IN | BODY MASS INDEX: 42.38 KG/M2 | WEIGHT: 270 LBS | TEMPERATURE: 98.3 F

## 2024-11-11 DIAGNOSIS — E11.65 TYPE 2 DIABETES MELLITUS WITH HYPERGLYCEMIA, WITH LONG-TERM CURRENT USE OF INSULIN: Primary | ICD-10-CM

## 2024-11-11 DIAGNOSIS — Z79.4 TYPE 2 DIABETES MELLITUS WITH HYPERGLYCEMIA, WITH LONG-TERM CURRENT USE OF INSULIN: Primary | ICD-10-CM

## 2024-11-11 DIAGNOSIS — E66.01 CLASS 3 SEVERE OBESITY DUE TO EXCESS CALORIES WITH SERIOUS COMORBIDITY AND BODY MASS INDEX (BMI) OF 40.0 TO 44.9 IN ADULT: ICD-10-CM

## 2024-11-11 DIAGNOSIS — E66.813 CLASS 3 SEVERE OBESITY DUE TO EXCESS CALORIES WITH SERIOUS COMORBIDITY AND BODY MASS INDEX (BMI) OF 40.0 TO 44.9 IN ADULT: ICD-10-CM

## 2024-11-11 DIAGNOSIS — E11.42 DIABETIC PERIPHERAL NEUROPATHY ASSOCIATED WITH TYPE 2 DIABETES MELLITUS: ICD-10-CM

## 2024-11-11 DIAGNOSIS — E11.69 HYPERLIPIDEMIA ASSOCIATED WITH TYPE 2 DIABETES MELLITUS: ICD-10-CM

## 2024-11-11 DIAGNOSIS — E78.5 HYPERLIPIDEMIA ASSOCIATED WITH TYPE 2 DIABETES MELLITUS: ICD-10-CM

## 2024-11-11 PROCEDURE — 99214 OFFICE O/P EST MOD 30 MIN: CPT | Performed by: NURSE PRACTITIONER

## 2024-11-11 RX ORDER — LISINOPRIL 40 MG/1
40 TABLET ORAL DAILY
Qty: 90 TABLET | Refills: 1 | Status: SHIPPED | OUTPATIENT
Start: 2024-11-11

## 2024-11-11 RX ORDER — EMPAGLIFLOZIN, METFORMIN HYDROCHLORIDE 12.5; 1 MG/1; MG/1
1 TABLET, EXTENDED RELEASE ORAL
Qty: 90 TABLET | Refills: 0 | Status: SHIPPED | OUTPATIENT
Start: 2024-11-11

## 2024-11-11 RX ORDER — DULAGLUTIDE 4.5 MG/.5ML
4 INJECTION, SOLUTION SUBCUTANEOUS WEEKLY
Qty: 6 ML | Refills: 0 | Status: SHIPPED | OUTPATIENT
Start: 2024-11-11 | End: 2024-11-11

## 2024-11-11 RX ORDER — ROSUVASTATIN CALCIUM 40 MG/1
40 TABLET, COATED ORAL DAILY
Qty: 90 TABLET | Refills: 1 | Status: SHIPPED | OUTPATIENT
Start: 2024-11-11

## 2024-11-11 RX ORDER — ACYCLOVIR 400 MG/1
1 TABLET ORAL
Qty: 9 EACH | Refills: 0 | Status: SHIPPED | OUTPATIENT
Start: 2024-11-11

## 2024-11-11 RX ORDER — INSULIN GLARGINE 300 U/ML
100 INJECTION, SOLUTION SUBCUTANEOUS DAILY
Qty: 36 ML | Refills: 0 | Status: SHIPPED | OUTPATIENT
Start: 2024-11-11

## 2024-11-11 NOTE — PROGRESS NOTES
"Chief Complaint  Diabetes    Subjective        Ashley Malone presents to [facility] ENDOCRINOLOGY  History of Present Illness    Could not get shoulder surgery in July r/t A1c   had been sick and she got off track with her medications    Type 2 dm, > 10 years    LOV 10/2023- cancelled 2/2024 and 4/2024 appt   DM regimen: trulicity 4.5mg weekly (reports upcoming eval for nausea), Toujeo 100u QD, Synjardy XR 12.5- 1000mg daily (out of x a few weeks)  Checking BS BID  AM: 115s  PM: 115s  Last eye exam: 2/2024  Known complications: neuropathy   Episodes of hypoglycemia: denies   CV: rosuvastatin 40mg QD  Renal: lisinopril 40mg QD   Has been gaining weight     Objective   Vital Signs:  /80   Temp 98.3 °F (36.8 °C) (Oral)   Ht 170.2 cm (67.01\")   Wt 122 kg (270 lb)   SpO2 94%   BMI 42.28 kg/m²   Estimated body mass index is 42.28 kg/m² as calculated from the following:    Height as of this encounter: 170.2 cm (67.01\").    Weight as of this encounter: 122 kg (270 lb).          Physical Exam  Vitals reviewed.   Constitutional:       General: She is not in acute distress.  HENT:      Head: Normocephalic and atraumatic.   Cardiovascular:      Rate and Rhythm: Normal rate.   Pulmonary:      Effort: Pulmonary effort is normal. No respiratory distress.   Musculoskeletal:         General: No signs of injury. Normal range of motion.      Cervical back: Normal range of motion and neck supple.   Skin:     General: Skin is warm and dry.   Neurological:      Mental Status: She is alert and oriented to person, place, and time. Mental status is at baseline.   Psychiatric:         Mood and Affect: Mood normal.         Behavior: Behavior normal.         Thought Content: Thought content normal.         Judgment: Judgment normal.        Result Review :  The following data was reviewed by: SAVANNAH Mosqueda on 11/11/2024:  Common labs          7/5/2024    10:44 7/15/2024    10:21 8/30/2024    08:55 "   Common Labs   Glucose 358      BUN 15      Creatinine 0.86      Sodium 136      Potassium 4.3      Chloride 93      Calcium 9.4      WBC 9.57  11.35        Hemoglobin 13.4  14.7        Hematocrit 40.9  46.9        Platelets 265  343        Hemoglobin A1C 10.10  10.2     8.9          Details          This result is from an external source.                       Assessment and Plan   Diagnoses and all orders for this visit:    1. Type 2 diabetes mellitus with hyperglycemia, with long-term current use of insulin (Primary)  -     Insulin Glargine, 2 Unit Dial, (Toujeo Max SoloStar) 300 UNIT/ML solution pen-injector injection; Inject 100 Units under the skin into the appropriate area as directed Daily. IF BLOOD SUGAR OVER 120  Dispense: 36 mL; Refill: 0  -     Insulin Pen Needle 32G X 4 MM misc; Daily with toujeo e11.65  Dispense: 100 each; Refill: 5  -     Empagliflozin-metFORMIN HCl ER (Synjardy XR) 12.5-1000 MG tablet sustained-release 24 hour; Take 1 tablet by mouth Daily With Breakfast.  Dispense: 90 tablet; Refill: 0  -     Continuous Glucose Sensor (Dexcom G7 Sensor) misc; Use 1 each Every 10 (Ten) Days.  Dispense: 9 each; Refill: 0  -     Tirzepatide 5 MG/0.5ML solution auto-injector; Inject 5 mg under the skin into the appropriate area as directed 1 (One) Time Per Week.  Dispense: 2 mL; Refill: 0  -     lisinopril (PRINIVIL,ZESTRIL) 40 MG tablet; Take 1 tablet by mouth Daily.  Dispense: 90 tablet; Refill: 1  -     Lipid Panel  -     Hemoglobin A1c  -     Comprehensive Metabolic Panel  -     Microalbumin / Creatinine Urine Ratio - Urine, Clean Catch  -     TSH    2. Hyperlipidemia associated with type 2 diabetes mellitus  -     rosuvastatin (CRESTOR) 40 MG tablet; Take 1 tablet by mouth Daily.  Dispense: 90 tablet; Refill: 1    3. Diabetic peripheral neuropathy associated with type 2 diabetes mellitus    4. Class 3 severe obesity due to excess calories with serious comorbidity and body mass index (BMI) of 40.0  to 44.9 in adult    Other orders  -     Discontinue: Dulaglutide (Trulicity) 4.5 MG/0.5ML solution auto-injector; Inject 4 mg under the skin into the appropriate area as directed 1 (One) Time Per Week.  Dispense: 6 mL; Refill: 0             Follow Up   Return in about 3 months (around 2/11/2025).    Change trulicity to mounjaro r/t nausea  Add cgm   Labs today   Resume synjardy   Continue statin and lisinopril   Further recommendations to follow as needed based on lab results     Patient was given instructions and counseling regarding her condition or for health maintenance advice. Please see specific information pulled into the AVS if appropriate.     SAVANNAH Mosqueda

## 2024-11-12 LAB
ALBUMIN SERPL-MCNC: 4.4 G/DL (ref 3.9–4.9)
ALBUMIN/CREAT UR: 21 MG/G CREAT (ref 0–29)
ALP SERPL-CCNC: 86 IU/L (ref 44–121)
ALT SERPL-CCNC: 12 IU/L (ref 0–32)
AST SERPL-CCNC: 15 IU/L (ref 0–40)
BILIRUB SERPL-MCNC: 0.3 MG/DL (ref 0–1.2)
BUN SERPL-MCNC: 24 MG/DL (ref 8–27)
BUN/CREAT SERPL: 25 (ref 12–28)
CALCIUM SERPL-MCNC: 10 MG/DL (ref 8.7–10.3)
CHLORIDE SERPL-SCNC: 96 MMOL/L (ref 96–106)
CHOLEST SERPL-MCNC: 268 MG/DL (ref 100–199)
CO2 SERPL-SCNC: 25 MMOL/L (ref 20–29)
CREAT SERPL-MCNC: 0.96 MG/DL (ref 0.57–1)
CREAT UR-MCNC: 189.8 MG/DL
EGFRCR SERPLBLD CKD-EPI 2021: 66 ML/MIN/1.73
GLOBULIN SER CALC-MCNC: 3 G/DL (ref 1.5–4.5)
GLUCOSE SERPL-MCNC: 183 MG/DL (ref 70–99)
HBA1C MFR BLD: 8.2 % (ref 4.8–5.6)
HDLC SERPL-MCNC: 64 MG/DL
IMP & REVIEW OF LAB RESULTS: NORMAL
LDLC SERPL CALC-MCNC: 146 MG/DL (ref 0–99)
MICROALBUMIN UR-MCNC: 39.3 UG/ML
POTASSIUM SERPL-SCNC: 4.6 MMOL/L (ref 3.5–5.2)
PROT SERPL-MCNC: 7.4 G/DL (ref 6–8.5)
SODIUM SERPL-SCNC: 139 MMOL/L (ref 134–144)
TRIGL SERPL-MCNC: 320 MG/DL (ref 0–149)
TSH SERPL DL<=0.005 MIU/L-ACNC: 0.69 UIU/ML (ref 0.45–4.5)
VLDLC SERPL CALC-MCNC: 58 MG/DL (ref 5–40)

## 2024-11-12 NOTE — PROGRESS NOTES
Specialty Pharmacy       Ashley Malone is a 64 y.o. female seen by an Endocrinology provider for Type 2 Diabetes.    Benefits Investigation Summary    Prescription: mounjaro and dexcom g7    Dispensing pharmacy: [facility]      PLAN: [facility]  BIN: [id number]  PCN: [id number]  RX GROUP:     Prior Auth and Med Assistance notes: Prior authorization initiated for dexcom G7 (CMM: Key: [id number]) and Mounjaro (CMM: Key: [id number]).    Addendum 11/13/24: Insurance denied both prescriptions stating that they are both excluded from the plans coverage.   Reached out to provider and will plan to continue trulicity.   Freestyle natalie CGM is covered on the insurance plan for $30 per month. Called and LVM for patient to contact us to see if this is affordable. If so, provider is agreeable to send a prescription for the natalie sensors.  Waiting to hear back from patient.    11/14/24: Called and LVM with patient again to discuss.  11/15/24: Called and LVM with patient  11/20/24: Called and LVM with patient    Attempted to reach patient multiple times, but have been unable to. Attempted to coordinate refills and update her about the coverage of her medications.     Patient was previously disenrolled from the specialty pharmacy program in May because we were unable to reach her.   Closing encounter.  Tashia Lucia, PharmD  Clinical Specialty Pharmacist, Endocrinology  11/12/2024  08:49 EST

## 2025-02-07 ENCOUNTER — PRIOR AUTHORIZATION (OUTPATIENT)
Dept: ENDOCRINOLOGY | Age: 65
End: 2025-02-07
Payer: COMMERCIAL

## 2025-02-07 DIAGNOSIS — Z79.4 TYPE 2 DIABETES MELLITUS WITH HYPERGLYCEMIA, WITH LONG-TERM CURRENT USE OF INSULIN: ICD-10-CM

## 2025-02-07 DIAGNOSIS — E11.65 TYPE 2 DIABETES MELLITUS WITH HYPERGLYCEMIA, WITH LONG-TERM CURRENT USE OF INSULIN: ICD-10-CM

## 2025-02-07 RX ORDER — EMPAGLIFLOZIN, METFORMIN HYDROCHLORIDE 12.5; 1 MG/1; MG/1
1 TABLET, EXTENDED RELEASE ORAL
Qty: 90 TABLET | Refills: 0 | Status: CANCELLED | OUTPATIENT
Start: 2025-02-07

## 2025-03-12 RX ORDER — BLOOD-GLUCOSE METER
1 KIT MISCELLANEOUS 4 TIMES DAILY
Qty: 1 KIT | Refills: 1 | Status: SHIPPED | OUTPATIENT
Start: 2025-03-12

## 2025-03-12 NOTE — TELEPHONE ENCOUNTER
Rx Refill Note  Requested Prescriptions     Pending Prescriptions Disp Refills    Blood Glucose Monitoring Suppl (OneTouch Verio Reflect) w/Device kit 1 kit 1     Si each by Other route 4 (Four) Times a Day. as directed      Last office visit with prescribing clinician: 2024   Last telemedicine visit with prescribing clinician: Visit date not found   Next office visit with prescribing clinician: 2025                         Would you like a call back once the refill request has been completed: [] Yes [] No    If the office needs to give you a call back, can they leave a voicemail: [] Yes [] No    Nicole Kirkpatrick  25, 08:06 EDT

## 2025-03-14 DIAGNOSIS — E11.65 TYPE 2 DIABETES MELLITUS WITH HYPERGLYCEMIA, WITH LONG-TERM CURRENT USE OF INSULIN: ICD-10-CM

## 2025-03-14 DIAGNOSIS — Z79.4 TYPE 2 DIABETES MELLITUS WITH HYPERGLYCEMIA, WITH LONG-TERM CURRENT USE OF INSULIN: ICD-10-CM

## 2025-03-14 RX ORDER — INSULIN GLARGINE 300 U/ML
100 INJECTION, SOLUTION SUBCUTANEOUS DAILY
Qty: 12 ML | Refills: 0 | Status: SHIPPED | OUTPATIENT
Start: 2025-03-14

## 2025-03-14 NOTE — TELEPHONE ENCOUNTER
Rx Refill Note  Requested Prescriptions     Pending Prescriptions Disp Refills    Insulin Glargine, 2 Unit Dial, (Tocassia Zarate) 300 UNIT/ML solution pen-injector injection 36 mL 0     Sig: Inject 100 Units under the skin into the appropriate area as directed Daily. IF BLOOD SUGAR OVER 120      Last office visit with prescribing clinician: 11/11/2024   Last telemedicine visit with prescribing clinician: Visit date not found   Next office visit with prescribing clinician: 4/2/2025                         Would you like a call back once the refill request has been completed: [] Yes [] No    If the office needs to give you a call back, can they leave a voicemail: [] Yes [] No    Nicole Kirkpatrick  03/14/25, 08:02 EDT

## 2025-04-02 ENCOUNTER — PRIOR AUTHORIZATION (OUTPATIENT)
Dept: ENDOCRINOLOGY | Age: 65
End: 2025-04-02

## 2025-04-02 ENCOUNTER — OFFICE VISIT (OUTPATIENT)
Dept: ENDOCRINOLOGY | Age: 65
End: 2025-04-02
Payer: COMMERCIAL

## 2025-04-02 VITALS
BODY MASS INDEX: 43.44 KG/M2 | TEMPERATURE: 98.2 F | OXYGEN SATURATION: 95 % | WEIGHT: 276.8 LBS | HEIGHT: 67 IN | SYSTOLIC BLOOD PRESSURE: 134 MMHG | DIASTOLIC BLOOD PRESSURE: 90 MMHG | HEART RATE: 72 BPM

## 2025-04-02 DIAGNOSIS — E66.813 CLASS 3 SEVERE OBESITY DUE TO EXCESS CALORIES WITH SERIOUS COMORBIDITY AND BODY MASS INDEX (BMI) OF 40.0 TO 44.9 IN ADULT: ICD-10-CM

## 2025-04-02 DIAGNOSIS — E11.65 TYPE 2 DIABETES MELLITUS WITH HYPERGLYCEMIA, WITH LONG-TERM CURRENT USE OF INSULIN: Primary | ICD-10-CM

## 2025-04-02 DIAGNOSIS — E11.69 HYPERLIPIDEMIA ASSOCIATED WITH TYPE 2 DIABETES MELLITUS: ICD-10-CM

## 2025-04-02 DIAGNOSIS — E66.01 CLASS 3 SEVERE OBESITY DUE TO EXCESS CALORIES WITH SERIOUS COMORBIDITY AND BODY MASS INDEX (BMI) OF 40.0 TO 44.9 IN ADULT: ICD-10-CM

## 2025-04-02 DIAGNOSIS — Z79.4 TYPE 2 DIABETES MELLITUS WITH HYPERGLYCEMIA, WITH LONG-TERM CURRENT USE OF INSULIN: Primary | ICD-10-CM

## 2025-04-02 DIAGNOSIS — E78.5 HYPERLIPIDEMIA ASSOCIATED WITH TYPE 2 DIABETES MELLITUS: ICD-10-CM

## 2025-04-02 DIAGNOSIS — E11.42 DIABETIC PERIPHERAL NEUROPATHY ASSOCIATED WITH TYPE 2 DIABETES MELLITUS: ICD-10-CM

## 2025-04-02 LAB
BUN SERPL-MCNC: 13 MG/DL (ref 8–23)
BUN/CREAT SERPL: 14.8 (ref 7–25)
CALCIUM SERPL-MCNC: 9.8 MG/DL (ref 8.6–10.5)
CHLORIDE SERPL-SCNC: 98 MMOL/L (ref 98–107)
CHOLEST SERPL-MCNC: 239 MG/DL (ref 0–200)
CO2 SERPL-SCNC: 28.2 MMOL/L (ref 22–29)
CREAT SERPL-MCNC: 0.88 MG/DL (ref 0.57–1)
EGFRCR SERPLBLD CKD-EPI 2021: 73.5 ML/MIN/1.73
GLUCOSE SERPL-MCNC: 119 MG/DL (ref 65–99)
HBA1C MFR BLD: 9.4 % (ref 4.8–5.6)
HDLC SERPL-MCNC: 52 MG/DL (ref 40–60)
IMP & REVIEW OF LAB RESULTS: NORMAL
LDLC SERPL CALC-MCNC: 104 MG/DL (ref 0–100)
POTASSIUM SERPL-SCNC: 4.7 MMOL/L (ref 3.5–5.2)
SODIUM SERPL-SCNC: 138 MMOL/L (ref 136–145)
TRIGL SERPL-MCNC: 492 MG/DL (ref 0–150)
VLDLC SERPL CALC-MCNC: 83 MG/DL (ref 5–40)

## 2025-04-02 PROCEDURE — 99214 OFFICE O/P EST MOD 30 MIN: CPT | Performed by: NURSE PRACTITIONER

## 2025-04-02 RX ORDER — EMPAGLIFLOZIN, METFORMIN HYDROCHLORIDE 12.5; 1 MG/1; MG/1
1 TABLET, EXTENDED RELEASE ORAL 2 TIMES DAILY
Qty: 180 TABLET | Refills: 0 | Status: SHIPPED | OUTPATIENT
Start: 2025-04-02

## 2025-04-02 RX ORDER — INSULIN GLARGINE 300 U/ML
100 INJECTION, SOLUTION SUBCUTANEOUS 2 TIMES DAILY
Qty: 60 ML | Refills: 0 | Status: SHIPPED | OUTPATIENT
Start: 2025-04-02

## 2025-04-02 RX ORDER — SEMAGLUTIDE 0.68 MG/ML
INJECTION, SOLUTION SUBCUTANEOUS
Qty: 9 ML | Refills: 0 | Status: SHIPPED | OUTPATIENT
Start: 2025-04-02

## 2025-04-02 NOTE — TELEPHONE ENCOUNTER
A PA for Ozempic (0.25 or 0.5 MG/DOSE) 2MG/3ML pen-injectors has been started.    (Key: [id number])  Rx #: [id number]

## 2025-04-02 NOTE — TELEPHONE ENCOUNTER
PA Case: [id number], Status: Approved,     Coverage Starts on: 4/2/2025 12:00:00 AM, Coverage Ends on: 4/2/2026 12:00:00 AM..     Authorization Expiration Date: April 2, 2026.

## 2025-04-02 NOTE — PROGRESS NOTES
"Chief Complaint  Diabetes    Subjective        Ashley Malone presents to [facility] ENDOCRINOLOGY  History of Present Illness    In need of should shoulder and leg surgery     Type 2 dm, > 10 years    LOV 11/11/24, cancelled 2/18/25 f/u appt, prior to that LOV 10/2023- cancelled 2/2024 and 4/2024 appt   Mounjaro excluded from her plan   Stopped trulicity r/t nausea  Tolerated ozempic in the past but previously wasn't covered  DM regimen: Toujeo 100u BID (self adjusted to BID), Synjardy XR 12.5- 1000mg daily (ran out recently)  Checking BS BID  Fasting : 180s  PM: 170-180s  \" Really trying to cut out the sugar\"  Last eye exam: 2/2024, \" I'll get in there\"  Known complications: neuropathy   Episodes of hypoglycemia: denies   CV: rosuvastatin 40mg QD  Renal: lisinopril 40mg QD       Objective   Vital Signs:  /90   Pulse 72   Temp 98.2 °F (36.8 °C) (Oral)   Ht 170.2 cm (67.01\")   Wt 126 kg (276 lb 12.8 oz)   SpO2 95%   BMI 43.34 kg/m²   Estimated body mass index is 43.34 kg/m² as calculated from the following:    Height as of this encounter: 170.2 cm (67.01\").    Weight as of this encounter: 126 kg (276 lb 12.8 oz).          Physical Exam  Vitals reviewed.   Constitutional:       General: She is not in acute distress.  HENT:      Head: Normocephalic and atraumatic.   Cardiovascular:      Rate and Rhythm: Normal rate.   Pulmonary:      Effort: Pulmonary effort is normal. No respiratory distress.   Musculoskeletal:         General: No signs of injury. Normal range of motion.      Cervical back: Normal range of motion and neck supple.   Skin:     General: Skin is warm and dry.   Neurological:      Mental Status: She is alert and oriented to person, place, and time. Mental status is at baseline.   Psychiatric:         Mood and Affect: Mood normal.         Behavior: Behavior normal.         Thought Content: Thought content normal.         Judgment: Judgment normal.        Result Review :  The " following data was reviewed by: SAVANNAH Mosqueda on 04/02/2025:  Common labs          7/15/2024    10:21 8/30/2024    08:55 11/11/2024    13:50   Common Labs   Glucose   183    BUN   24    Creatinine   0.96    Sodium   139    Potassium   4.6    Chloride   96    Calcium   10.0    Albumin   4.4    Total Bilirubin   0.3    Alkaline Phosphatase   86    AST (SGOT)   15    ALT (SGPT)   12    WBC 11.35         Hemoglobin 14.7         Hematocrit 46.9         Platelets 343         Total Cholesterol   268    Triglycerides   320    HDL Cholesterol   64    LDL Cholesterol    146    Hemoglobin A1C 10.2     8.9     8.2    Microalbumin, Urine   39.3       Details          This result is from an external source.                       Assessment and Plan   Diagnoses and all orders for this visit:    1. Type 2 diabetes mellitus with hyperglycemia, with long-term current use of insulin (Primary)  -     Insulin Glargine, 2 Unit Dial, (Toujeo Max SoloStar) 300 UNIT/ML solution pen-injector injection; Inject 100 Units under the skin into the appropriate area as directed 2 (Two) Times a Day.  Dispense: 60 mL; Refill: 0  -     Semaglutide,0.25 or 0.5MG/DOS, (Ozempic, 0.25 or 0.5 MG/DOSE,) 2 MG/3ML solution pen-injector; 0.25mg weekly x 4 weeks and then 0.5mg x 4 weeks and then will increase to 1mg  Dispense: 9 mL; Refill: 0  -     Empagliflozin-metFORMIN HCl ER (Synjardy XR) 12.5-1000 MG tablet sustained-release 24 hour; Take 1 tablet by mouth 2 (Two) Times a Day.  Dispense: 180 tablet; Refill: 0  -     Hemoglobin A1c  -     Basic Metabolic Panel  -     Lipid Panel    2. Hyperlipidemia associated with type 2 diabetes mellitus    3. Diabetic peripheral neuropathy associated with type 2 diabetes mellitus    4. Class 3 severe obesity due to excess calories with serious comorbidity and body mass index (BMI) of 40.0 to 44.9 in adult             Follow Up   No follow-ups on file.    Resume glp-1: ozempic, tolerated in the past  Ok to  countine toujeo 100u BID cautiously, will need to adjust dose accordingly when starting ozempic, patient verbalizes understanding  Increase Synjardy to BID  If additional support needed from there, add meal time insulin dose    Labs today, additional recommendations to follow as needed     Patient was given instructions and counseling regarding her condition or for health maintenance advice. Please see specific information pulled into the AVS if appropriate.     Mayda Royal, APRN

## 2025-06-30 DIAGNOSIS — Z79.4 TYPE 2 DIABETES MELLITUS WITH HYPERGLYCEMIA, WITH LONG-TERM CURRENT USE OF INSULIN: ICD-10-CM

## 2025-06-30 DIAGNOSIS — E11.65 TYPE 2 DIABETES MELLITUS WITH HYPERGLYCEMIA, WITH LONG-TERM CURRENT USE OF INSULIN: ICD-10-CM

## 2025-06-30 RX ORDER — SEMAGLUTIDE 0.68 MG/ML
INJECTION, SOLUTION SUBCUTANEOUS
Qty: 9 ML | Refills: 0 | Status: CANCELLED | OUTPATIENT
Start: 2025-06-30

## 2025-06-30 NOTE — TELEPHONE ENCOUNTER
Rx Refill Note  Requested Prescriptions     Pending Prescriptions Disp Refills    Semaglutide,0.25 or 0.5MG/DOS, (Ozempic, 0.25 or 0.5 MG/DOSE,) 2 MG/3ML solution pen-injector 9 mL 0     Si.25mg weekly x 4 weeks and then 0.5mg x 4 weeks and then will increase to 1mg      Last office visit with prescribing clinician: 2025   Last telemedicine visit with prescribing clinician: Visit date not found   Next office visit with prescribing clinician: 2025                         Would you like a call back once the refill request has been completed: [] Yes [] No    If the office needs to give you a call back, can they leave a voicemail: [] Yes [] No    Jesenia Smith MA  25, 12:54 EDT

## 2025-06-30 NOTE — TELEPHONE ENCOUNTER
What dose is patient currently taking? Is she happy with this dose or is she wanting to increase the dose?

## 2025-07-09 ENCOUNTER — OFFICE VISIT (OUTPATIENT)
Dept: ENDOCRINOLOGY | Age: 65
End: 2025-07-09
Payer: COMMERCIAL

## 2025-07-09 VITALS
TEMPERATURE: 98.4 F | WEIGHT: 253.8 LBS | HEIGHT: 67 IN | SYSTOLIC BLOOD PRESSURE: 142 MMHG | BODY MASS INDEX: 39.83 KG/M2 | OXYGEN SATURATION: 98 % | HEART RATE: 95 BPM | DIASTOLIC BLOOD PRESSURE: 90 MMHG

## 2025-07-09 DIAGNOSIS — E78.5 HYPERLIPIDEMIA ASSOCIATED WITH TYPE 2 DIABETES MELLITUS: ICD-10-CM

## 2025-07-09 DIAGNOSIS — E11.69 HYPERLIPIDEMIA ASSOCIATED WITH TYPE 2 DIABETES MELLITUS: ICD-10-CM

## 2025-07-09 DIAGNOSIS — E66.812 CLASS 2 SEVERE OBESITY DUE TO EXCESS CALORIES WITH SERIOUS COMORBIDITY AND BODY MASS INDEX (BMI) OF 39.0 TO 39.9 IN ADULT: ICD-10-CM

## 2025-07-09 DIAGNOSIS — E11.65 TYPE 2 DIABETES MELLITUS WITH HYPERGLYCEMIA, WITH LONG-TERM CURRENT USE OF INSULIN: Primary | ICD-10-CM

## 2025-07-09 DIAGNOSIS — E66.01 CLASS 2 SEVERE OBESITY DUE TO EXCESS CALORIES WITH SERIOUS COMORBIDITY AND BODY MASS INDEX (BMI) OF 39.0 TO 39.9 IN ADULT: ICD-10-CM

## 2025-07-09 DIAGNOSIS — Z79.4 TYPE 2 DIABETES MELLITUS WITH HYPERGLYCEMIA, WITH LONG-TERM CURRENT USE OF INSULIN: Primary | ICD-10-CM

## 2025-07-09 DIAGNOSIS — E11.42 DIABETIC PERIPHERAL NEUROPATHY ASSOCIATED WITH TYPE 2 DIABETES MELLITUS: ICD-10-CM

## 2025-07-09 LAB
ALBUMIN SERPL-MCNC: 4.6 G/DL (ref 3.5–5.2)
ALBUMIN/GLOB SERPL: 1.7 G/DL
ALP SERPL-CCNC: 69 U/L (ref 39–117)
ALT SERPL-CCNC: 8 U/L (ref 1–33)
AST SERPL-CCNC: 13 U/L (ref 1–32)
BILIRUB SERPL-MCNC: 0.4 MG/DL (ref 0–1.2)
BUN SERPL-MCNC: 9 MG/DL (ref 8–23)
BUN/CREAT SERPL: 10.3 (ref 7–25)
CALCIUM SERPL-MCNC: 10 MG/DL (ref 8.6–10.5)
CHLORIDE SERPL-SCNC: 95 MMOL/L (ref 98–107)
CO2 SERPL-SCNC: 29.8 MMOL/L (ref 22–29)
CREAT SERPL-MCNC: 0.87 MG/DL (ref 0.57–1)
EGFRCR SERPLBLD CKD-EPI 2021: 74.5 ML/MIN/1.73
GLOBULIN SER CALC-MCNC: 2.7 GM/DL
GLUCOSE SERPL-MCNC: 168 MG/DL (ref 65–99)
HBA1C MFR BLD: 10.1 % (ref 4.8–5.6)
POTASSIUM SERPL-SCNC: 5.2 MMOL/L (ref 3.5–5.2)
PROT SERPL-MCNC: 7.3 G/DL (ref 6–8.5)
SODIUM SERPL-SCNC: 137 MMOL/L (ref 136–145)

## 2025-07-09 PROCEDURE — 99214 OFFICE O/P EST MOD 30 MIN: CPT | Performed by: NURSE PRACTITIONER

## 2025-07-09 RX ORDER — INSULIN GLARGINE 300 U/ML
100 INJECTION, SOLUTION SUBCUTANEOUS 2 TIMES DAILY
Qty: 60 ML | Refills: 0 | Status: SHIPPED | OUTPATIENT
Start: 2025-07-09

## 2025-07-09 RX ORDER — SEMAGLUTIDE 1.34 MG/ML
1 INJECTION, SOLUTION SUBCUTANEOUS WEEKLY
Qty: 9 ML | Refills: 0 | Status: SHIPPED | OUTPATIENT
Start: 2025-07-09

## 2025-07-09 RX ORDER — EMPAGLIFLOZIN, METFORMIN HYDROCHLORIDE 12.5; 1 MG/1; MG/1
1 TABLET, EXTENDED RELEASE ORAL 2 TIMES DAILY
Qty: 180 TABLET | Refills: 0 | Status: SHIPPED | OUTPATIENT
Start: 2025-07-09

## 2025-07-09 NOTE — PROGRESS NOTES
"Chief Complaint  Diabetes    Subjective        Ashley Malone presents to [facility] ENDOCRINOLOGY  History of Present Illness    Type 2 dm, > 10 years    DM regimen: Toujeo 100u 1-2x/day, forgets to take second dose, Synjardy XR 12.5- 1000mg daily, ozempic 0.5mg weekly    Checking BS: BID  Fasting : 170s  PM: 170s  Weight is down ~ 23 pounds since 4/2025  Last eye exam: overdue   Known complications: neuropathy   Episodes of hypoglycemia: denies when taking toujeo BID  CV: rosuvastatin 40mg QD  Renal: lisinopril 40mg QD        Objective   Vital Signs:  /90   Pulse 95   Temp 98.4 °F (36.9 °C) (Oral)   Ht 170.2 cm (67.01\")   Wt 115 kg (253 lb 12.8 oz)   SpO2 98%   BMI 39.74 kg/m²   Estimated body mass index is 39.74 kg/m² as calculated from the following:    Height as of this encounter: 170.2 cm (67.01\").    Weight as of this encounter: 115 kg (253 lb 12.8 oz).            Physical Exam  Vitals reviewed.   Constitutional:       General: She is not in acute distress.  HENT:      Head: Normocephalic and atraumatic.   Cardiovascular:      Rate and Rhythm: Normal rate.   Pulmonary:      Effort: Pulmonary effort is normal. No respiratory distress.   Musculoskeletal:         General: No signs of injury. Normal range of motion.      Cervical back: Normal range of motion and neck supple.   Skin:     General: Skin is warm and dry.   Neurological:      Mental Status: She is alert and oriented to person, place, and time. Mental status is at baseline.   Psychiatric:         Mood and Affect: Mood normal.         Behavior: Behavior normal.         Thought Content: Thought content normal.         Judgment: Judgment normal.        Result Review :  The following data was reviewed by: SAVANNAH Mosqueda on 07/09/2025:  Common labs          8/30/2024    08:55 11/11/2024    13:50 4/2/2025    09:56   Common Labs   Glucose  183  119    BUN  24  13    Creatinine  0.96  0.88    Sodium  139  138    Potassium  " 4.6  4.7    Chloride  96  98    Calcium  10.0  9.8    Albumin  4.4     Total Bilirubin  0.3     Alkaline Phosphatase  86     AST (SGOT)  15     ALT (SGPT)  12     Total Cholesterol  268  239    Triglycerides  320  492    HDL Cholesterol  64  52    LDL Cholesterol   146  104    Hemoglobin A1C 8.9     8.2  9.40    Microalbumin, Urine  39.3        Details          This result is from an external source.                       Assessment and Plan   Diagnoses and all orders for this visit:    1. Type 2 diabetes mellitus with hyperglycemia, with long-term current use of insulin (Primary)  -     Semaglutide, 1 MG/DOSE, (Ozempic, 1 MG/DOSE,) 4 MG/3ML solution pen-injector; Inject 1 mg under the skin into the appropriate area as directed 1 (One) Time Per Week.  Dispense: 9 mL; Refill: 0  -     Insulin Glargine, 2 Unit Dial, (Toujeo Max SoloStar) 300 UNIT/ML solution pen-injector injection; Inject 100 Units under the skin into the appropriate area as directed 2 (Two) Times a Day.  Dispense: 60 mL; Refill: 0  -     Empagliflozin-metFORMIN HCl ER (Synjardy XR) 12.5-1000 MG tablet sustained-release 24 hour; Take 1 tablet by mouth 2 (Two) Times a Day.  Dispense: 180 tablet; Refill: 0  -     Hemoglobin A1c  -     Comprehensive Metabolic Panel    2. Hyperlipidemia associated with type 2 diabetes mellitus    3. Diabetic peripheral neuropathy associated with type 2 diabetes mellitus    4. Class 2 severe obesity due to excess calories with serious comorbidity and body mass index (BMI) of 39.0 to 39.9 in adult             Follow Up   Return in about 3 months (around 10/9/2025).    Labs today  BMI improving  Unsure if glucose control is improving with unchanged BGM reports   Arrange diabetic eye exam  Additional recommendations to follow as needed     Patient was given instructions and counseling regarding her condition or for health maintenance advice. Please see specific information pulled into the AVS if appropriate.       Mayda  Genny, APRN